# Patient Record
Sex: FEMALE | Race: BLACK OR AFRICAN AMERICAN | HISPANIC OR LATINO | Employment: FULL TIME | URBAN - METROPOLITAN AREA
[De-identification: names, ages, dates, MRNs, and addresses within clinical notes are randomized per-mention and may not be internally consistent; named-entity substitution may affect disease eponyms.]

---

## 2020-09-18 ENCOUNTER — LAB (OUTPATIENT)
Dept: LAB | Facility: HOSPITAL | Age: 25
End: 2020-09-18

## 2020-09-18 ENCOUNTER — OFFICE VISIT (OUTPATIENT)
Dept: INTERNAL MEDICINE | Facility: CLINIC | Age: 25
End: 2020-09-18

## 2020-09-18 VITALS
BODY MASS INDEX: 20.89 KG/M2 | DIASTOLIC BLOOD PRESSURE: 70 MMHG | HEIGHT: 66 IN | TEMPERATURE: 97.1 F | OXYGEN SATURATION: 99 % | WEIGHT: 130 LBS | HEART RATE: 92 BPM | SYSTOLIC BLOOD PRESSURE: 118 MMHG | RESPIRATION RATE: 16 BRPM

## 2020-09-18 DIAGNOSIS — R00.2 PALPITATIONS: ICD-10-CM

## 2020-09-18 DIAGNOSIS — R00.2 PALPITATIONS: Primary | ICD-10-CM

## 2020-09-18 DIAGNOSIS — D50.9 MICROCYTIC ANEMIA: ICD-10-CM

## 2020-09-18 DIAGNOSIS — F41.9 ANXIETY: ICD-10-CM

## 2020-09-18 DIAGNOSIS — Z23 NEED FOR VACCINATION: ICD-10-CM

## 2020-09-18 LAB
DEPRECATED RDW RBC AUTO: 39.1 FL (ref 37–54)
ERYTHROCYTE [DISTWIDTH] IN BLOOD BY AUTOMATED COUNT: 17.1 % (ref 12.3–15.4)
HCT VFR BLD AUTO: 33.6 % (ref 34–46.6)
HGB BLD-MCNC: 10.1 G/DL (ref 12–15.9)
MCH RBC QN AUTO: 19.7 PG (ref 26.6–33)
MCHC RBC AUTO-ENTMCNC: 30.1 G/DL (ref 31.5–35.7)
MCV RBC AUTO: 65.5 FL (ref 79–97)
PLATELET # BLD AUTO: 270 10*3/MM3 (ref 140–450)
PMV BLD AUTO: 11.4 FL (ref 6–12)
RBC # BLD AUTO: 5.13 10*6/MM3 (ref 3.77–5.28)
WBC # BLD AUTO: 6.48 10*3/MM3 (ref 3.4–10.8)

## 2020-09-18 PROCEDURE — 80053 COMPREHEN METABOLIC PANEL: CPT

## 2020-09-18 PROCEDURE — 99204 OFFICE O/P NEW MOD 45 MIN: CPT | Performed by: NURSE PRACTITIONER

## 2020-09-18 PROCEDURE — 84443 ASSAY THYROID STIM HORMONE: CPT

## 2020-09-18 PROCEDURE — 90471 IMMUNIZATION ADMIN: CPT | Performed by: NURSE PRACTITIONER

## 2020-09-18 PROCEDURE — 82728 ASSAY OF FERRITIN: CPT

## 2020-09-18 PROCEDURE — 90686 IIV4 VACC NO PRSV 0.5 ML IM: CPT | Performed by: NURSE PRACTITIONER

## 2020-09-18 PROCEDURE — 84466 ASSAY OF TRANSFERRIN: CPT

## 2020-09-18 PROCEDURE — 36415 COLL VENOUS BLD VENIPUNCTURE: CPT

## 2020-09-18 PROCEDURE — 83540 ASSAY OF IRON: CPT

## 2020-09-18 PROCEDURE — 85027 COMPLETE CBC AUTOMATED: CPT

## 2020-09-18 PROCEDURE — 93000 ELECTROCARDIOGRAM COMPLETE: CPT | Performed by: NURSE PRACTITIONER

## 2020-09-18 RX ORDER — HYDROXYZINE HYDROCHLORIDE 25 MG/1
25 TABLET, FILM COATED ORAL 3 TIMES DAILY PRN
Qty: 30 TABLET | Refills: 0 | Status: SHIPPED | OUTPATIENT
Start: 2020-09-18 | End: 2020-10-21 | Stop reason: SDUPTHER

## 2020-09-18 NOTE — PROGRESS NOTES
Subjective   Maddy Chisholm is a 25 y.o. female here to establish care.  Chief Complaint   Patient presents with   • Establish Care   • Rapid Heart Rate     ongoing-has anxiety       Heart Problem  This is a new problem. The current episode started more than 1 year ago. The problem occurs intermittently. The problem has been waxing and waning. Associated symptoms include fatigue. Pertinent negatives include no abdominal pain, arthralgias, chest pain, chills, coughing, diaphoresis, fever, headaches, joint swelling, myalgias, nausea, neck pain, numbness, rash, sore throat, urinary symptoms, vertigo, visual change, vomiting or weakness. Nothing (feeling anxious) aggravates the symptoms. She has tried nothing for the symptoms.   Anxiety  Presents for initial visit. Onset was more than 5 years ago. The problem has been waxing and waning. Symptoms include excessive worry, muscle tension, palpitations, panic and restlessness. Patient reports no chest pain, confusion, decreased concentration, depressed mood, dizziness, impotence, insomnia, irritability, nausea, nervous/anxious behavior, obsessions, shortness of breath or suicidal ideas. Symptoms occur occasionally. The severity of symptoms is moderate. Nothing (learning to drive) aggravates the symptoms. The quality of sleep is fair.     There are no known risk factors. There is no history of anemia, anxiety/panic attacks, arrhythmia, asthma, bipolar disorder, CAD, CHF, chronic lung disease, depression, fibromyalgia, hyperthyroidism or suicide attempts. Past treatments include nothing.      From [place]- got a prescription for anxiety meds there- she is not taking and does not recall the name of it.     JAIRO 7 score is a 15 in office making it somewhat difficult to take care of things at home.  She has nervousness anxiousness worry, trouble relaxing, and being restless and irritable with fears that something awful may happen.  She does admit that she is currently learning to  drive and this is a source of anxiety for her as well.        The following portions of the patient's history were reviewed and updated as appropriate: allergies, current medications, past family history, past medical history, past social history, past surgical history and problem list.    Review of Systems   Constitutional: Positive for fatigue. Negative for activity change, appetite change, chills, diaphoresis, fever, irritability, unexpected weight gain and unexpected weight loss.   HENT: Negative.  Negative for sore throat.    Eyes: Negative for pain and visual disturbance.   Respiratory: Negative for cough, chest tightness and shortness of breath.    Cardiovascular: Positive for palpitations. Negative for chest pain and leg swelling.   Gastrointestinal: Negative for abdominal distention, abdominal pain, constipation, diarrhea, nausea and vomiting.   Endocrine: Positive for cold intolerance and heat intolerance. Negative for polydipsia, polyphagia and polyuria.   Genitourinary: Negative.  Negative for difficulty urinating and impotence.   Musculoskeletal: Negative.  Negative for arthralgias, joint swelling, myalgias and neck pain.   Skin: Negative for color change, dry skin and rash.   Neurological: Negative for dizziness, vertigo, weakness, light-headedness, numbness, headache and confusion.   Hematological: Does not bruise/bleed easily.   Psychiatric/Behavioral: Positive for stress. Negative for agitation, behavioral problems, decreased concentration, dysphoric mood, hallucinations, sleep disturbance, suicidal ideas, negative for hyperactivity and depressed mood. The patient is not nervous/anxious and does not have insomnia.            No Known Allergies  Past Medical History:   Diagnosis Date   • Anxiety      Past Surgical History:   Procedure Laterality Date   • NO PAST SURGERIES       Family History   Adopted: Yes     Social History     Socioeconomic History   • Marital status: Single     Spouse name: Not  "on file   • Number of children: 0   • Years of education: Not on file   • Highest education level: Not on file   Occupational History   • Occupation: [employer]i   Social Needs   • Financial resource strain: Not hard at all   • Food insecurity     Worry: Never true     Inability: Never true   • Transportation needs     Medical: No     Non-medical: No   Tobacco Use   • Smoking status: Former Smoker     Types: Cigarettes     Start date: 2020     Quit date: 8/15/2020     Years since quittin.1   • Smokeless tobacco: Never Used   • Tobacco comment: smoked for 5 years  (black and milds for 5 years then restarted 2020 and quit 2020   Substance and Sexual Activity   • Alcohol use: Never     Frequency: Never   • Drug use: Never   • Sexual activity: Yes     Birth control/protection: OCP   Social History Narrative    Lives with [facility]     Immunization History   Administered Date(s) Administered   • Flulaval/Fluarix Quad 2020       Current Outpatient Medications:   •  DROSPIRENONE-ETHINYL ESTRADIOL PO, Take 1 tablet by mouth Daily., Disp: , Rfl:   •  hydrOXYzine (ATARAX) 25 MG tablet, Take 1 tablet by mouth 3 (Three) Times a Day As Needed for Anxiety., Disp: 30 tablet, Rfl: 0    Objective   Blood pressure 118/70, pulse 92, temperature 97.1 °F (36.2 °C), resp. rate 16, height 167.9 cm (66.1\"), weight 59 kg (130 lb), last menstrual period 2020, SpO2 99 %, not currently breastfeeding.  Physical Exam  Vitals signs and nursing note reviewed.   Constitutional:       General: She is not in acute distress.     Appearance: Normal appearance. She is well-developed. She is not diaphoretic.   HENT:      Head: Normocephalic and atraumatic.   Eyes:      Conjunctiva/sclera: Conjunctivae normal.      Pupils: Pupils are equal, round, and reactive to light.   Neck:      Musculoskeletal: Normal range of motion and neck supple.      Thyroid: No thyroid mass, thyromegaly or thyroid tenderness.      Vascular: No JVD. "   Cardiovascular:      Rate and Rhythm: Normal rate and regular rhythm.  No extrasystoles are present.     Chest Wall: PMI is not displaced. No thrill.      Pulses:           Radial pulses are 2+ on the right side and 2+ on the left side.        Dorsalis pedis pulses are 2+ on the right side and 2+ on the left side.        Posterior tibial pulses are 2+ on the right side and 2+ on the left side.      Heart sounds: Normal heart sounds. Heart sounds not distant. No murmur.   Pulmonary:      Effort: Pulmonary effort is normal. No tachypnea, bradypnea, accessory muscle usage or respiratory distress.      Breath sounds: Normal breath sounds.   Chest:      Chest wall: No tenderness.   Abdominal:      General: Bowel sounds are normal. There is no distension.      Palpations: Abdomen is soft.      Tenderness: There is no abdominal tenderness.   Musculoskeletal: Normal range of motion.      Right lower leg: No edema.      Left lower leg: No edema.   Lymphadenopathy:      Cervical: No cervical adenopathy.   Skin:     General: Skin is warm and dry.      Coloration: Skin is not pale.      Findings: No erythema.   Neurological:      Mental Status: She is alert and oriented to person, place, and time.   Psychiatric:         Behavior: Behavior normal.         Thought Content: Thought content normal.         Judgment: Judgment normal.           ECG 12 Lead    Date/Time: 9/18/2020 10:10 AM  Performed by: Vero Lopez APRN  Authorized by: Vero Lopez APRN   Comparison: not compared with previous ECG   Previous ECG: no previous ECG available  Rhythm: sinus rhythm  Rate: normal  BPM: 76  Conduction: conduction normal  ST Segments: ST segments normal    Clinical impression: normal ECG  Comments:   QRS 74  QT/QTc 348/378  p-r-t 78  76  54          Assessment/Plan   Diagnoses and all orders for this visit:    1. Palpitations (Primary)  -     ECG 12 Lead  -     CBC (No Diff); Future  -     Comprehensive Metabolic Panel;  Future  -     TSH Rfx On Abnormal To Free T4; Future    2. Anxiety  -     ECG 12 Lead  -     CBC (No Diff); Future  -     Comprehensive Metabolic Panel; Future  -     TSH Rfx On Abnormal To Free T4; Future  -     hydrOXYzine (ATARAX) 25 MG tablet; Take 1 tablet by mouth 3 (Three) Times a Day As Needed for Anxiety.  Dispense: 30 tablet; Refill: 0    3. Microcytic anemia  -     Ferritin; Future  -     Iron Profile; Future    4. Need for vaccination  -     FluLaval Quad >6 Months ([id number])      Labs sent as above- will notify of results and treat accordingly. If patient has not received results within one week, they will notify my office  EKG shows NSR.   Hydroxyzine PRN anxiety, AE's discussed.   Flu vaccine updated.          Return in about 4 weeks (around 10/16/2020) for Recheck, Labs today.  Plan of care discussed with pt. They verbalized understanding and agreement.       * Please note that portions of this note were completed with a voice recognition program. Efforts were made to edit the dictation but occasionally words are erroneously transcribed.

## 2020-09-18 NOTE — PATIENT INSTRUCTIONS

## 2020-09-19 LAB
ALBUMIN SERPL-MCNC: 4.3 G/DL (ref 3.5–5.2)
ALBUMIN/GLOB SERPL: 1.2 G/DL
ALP SERPL-CCNC: 53 U/L (ref 39–117)
ALT SERPL W P-5'-P-CCNC: 8 U/L (ref 1–33)
ANION GAP SERPL CALCULATED.3IONS-SCNC: 10.1 MMOL/L (ref 5–15)
AST SERPL-CCNC: 20 U/L (ref 1–32)
BILIRUB SERPL-MCNC: <0.2 MG/DL (ref 0–1.2)
BUN SERPL-MCNC: 13 MG/DL (ref 6–20)
BUN/CREAT SERPL: 17.3 (ref 7–25)
CALCIUM SPEC-SCNC: 9.8 MG/DL (ref 8.6–10.5)
CHLORIDE SERPL-SCNC: 106 MMOL/L (ref 98–107)
CO2 SERPL-SCNC: 21.9 MMOL/L (ref 22–29)
CREAT SERPL-MCNC: 0.75 MG/DL (ref 0.57–1)
FERRITIN SERPL-MCNC: 4.84 NG/ML (ref 13–150)
GFR SERPL CREATININE-BSD FRML MDRD: 114 ML/MIN/1.73
GLOBULIN UR ELPH-MCNC: 3.6 GM/DL
GLUCOSE SERPL-MCNC: 95 MG/DL (ref 65–99)
IRON 24H UR-MRATE: 16 MCG/DL (ref 37–145)
IRON SATN MFR SERPL: 2 % (ref 20–50)
POTASSIUM SERPL-SCNC: 4.6 MMOL/L (ref 3.5–5.2)
PROT SERPL-MCNC: 7.9 G/DL (ref 6–8.5)
SODIUM SERPL-SCNC: 138 MMOL/L (ref 136–145)
TIBC SERPL-MCNC: 703 MCG/DL (ref 298–536)
TRANSFERRIN SERPL-MCNC: 472 MG/DL (ref 200–360)
TSH SERPL DL<=0.05 MIU/L-ACNC: 2.66 UIU/ML (ref 0.27–4.2)

## 2020-09-22 ENCOUNTER — TELEPHONE (OUTPATIENT)
Dept: INTERNAL MEDICINE | Facility: CLINIC | Age: 25
End: 2020-09-22

## 2020-09-22 DIAGNOSIS — D50.8 IRON DEFICIENCY ANEMIA SECONDARY TO INADEQUATE DIETARY IRON INTAKE: Primary | ICD-10-CM

## 2020-09-22 RX ORDER — DOXYCYCLINE HYCLATE 50 MG/1
324 CAPSULE, GELATIN COATED ORAL
Qty: 90 TABLET | Refills: 2 | Status: SHIPPED | OUTPATIENT
Start: 2020-09-22 | End: 2020-12-28 | Stop reason: SDUPTHER

## 2020-09-22 NOTE — TELEPHONE ENCOUNTER
THE PATIENTS FATHER IS CALLING TO REQUEST A PRESCRIPTION  FOR IRON FOR THE PATIENT. HE STATES THEY GOT HER TEST RESULTS BACK AND HE IRON WAS LOW. HE WOULD LIKE TO KNOW IF SHE SHOULD START ON SOME TIME OF PRESCRIPTION BEFORE HER NEXT FOLLOW UP APPOINTMENT IN October. PATIENT WOULD PREFER A GENERIC PRESCRIPTION IF POSSIBLE.    PATIENT CALLBACK [phone]   PREFERRED PHARMACY [address] [phone] [facility] [phone]   [phone]

## 2020-10-19 ENCOUNTER — OFFICE VISIT (OUTPATIENT)
Dept: INTERNAL MEDICINE | Facility: CLINIC | Age: 25
End: 2020-10-19

## 2020-10-19 ENCOUNTER — LAB (OUTPATIENT)
Dept: LAB | Facility: HOSPITAL | Age: 25
End: 2020-10-19

## 2020-10-19 VITALS
WEIGHT: 137 LBS | OXYGEN SATURATION: 99 % | HEART RATE: 90 BPM | HEIGHT: 66 IN | RESPIRATION RATE: 16 BRPM | TEMPERATURE: 97.7 F | BODY MASS INDEX: 22.02 KG/M2 | DIASTOLIC BLOOD PRESSURE: 68 MMHG | SYSTOLIC BLOOD PRESSURE: 108 MMHG

## 2020-10-19 DIAGNOSIS — D50.8 IRON DEFICIENCY ANEMIA SECONDARY TO INADEQUATE DIETARY IRON INTAKE: Primary | ICD-10-CM

## 2020-10-19 DIAGNOSIS — F41.9 ANXIETY: ICD-10-CM

## 2020-10-19 DIAGNOSIS — D50.8 IRON DEFICIENCY ANEMIA SECONDARY TO INADEQUATE DIETARY IRON INTAKE: ICD-10-CM

## 2020-10-19 DIAGNOSIS — R00.2 PALPITATIONS: ICD-10-CM

## 2020-10-19 DIAGNOSIS — Z30.41 ENCOUNTER FOR SURVEILLANCE OF CONTRACEPTIVE PILLS: ICD-10-CM

## 2020-10-19 LAB
DEPRECATED RDW RBC AUTO: 52.8 FL (ref 37–54)
ERYTHROCYTE [DISTWIDTH] IN BLOOD BY AUTOMATED COUNT: 23.1 % (ref 12.3–15.4)
HCT VFR BLD AUTO: 37.3 % (ref 34–46.6)
HGB BLD-MCNC: 11.8 G/DL (ref 12–15.9)
MCH RBC QN AUTO: 22 PG (ref 26.6–33)
MCHC RBC AUTO-ENTMCNC: 31.6 G/DL (ref 31.5–35.7)
MCV RBC AUTO: 69.6 FL (ref 79–97)
PLATELET # BLD AUTO: 233 10*3/MM3 (ref 140–450)
PMV BLD AUTO: 10.8 FL (ref 6–12)
RBC # BLD AUTO: 5.36 10*6/MM3 (ref 3.77–5.28)
WBC # BLD AUTO: 4.68 10*3/MM3 (ref 3.4–10.8)

## 2020-10-19 PROCEDURE — 99214 OFFICE O/P EST MOD 30 MIN: CPT | Performed by: NURSE PRACTITIONER

## 2020-10-19 PROCEDURE — 85027 COMPLETE CBC AUTOMATED: CPT

## 2020-10-19 RX ORDER — DROSPIRENONE AND ETHINYL ESTRADIOL 0.03MG-3MG
1 KIT ORAL DAILY
Qty: 28 TABLET | Refills: 3 | Status: SHIPPED | OUTPATIENT
Start: 2020-10-19 | End: 2021-02-19 | Stop reason: SDUPTHER

## 2020-10-19 NOTE — PROGRESS NOTES
Subjective   Maddy Chisholm is a 25 y.o. female.     Chief Complaint   Patient presents with   • Anxiety     follow up on anxiety and palpitaions   • Palpitations   • Contraception     refill birth control       History of Present Illness     Maddy is a 25-year-old female who is here for follow-up on anxiety, palpitations, iron deficiency anemia, and would like a refill on her contraception.  At her last visit we discussed some ongoing anxiety as a potential cause for the palpitations.  EKG at her last office visit showed a normal sinus rhythm.  She was started on hydroxyzine as needed for anxiety. She has used about 15 pills and feels like this is helping with the anxiety but she also feels as though the anemia was the primary cause for her palpitations.  Her labs also indicated that she had a iron deficiency anemia.  Her H&H were 10.2 and 33.6 while her ferritin was 4.84. she denies any blood loss.  She was not taking any iron rich foods prior to her last visit.  She has since started increasing iron in her diet as well as iron supplement TID with vit C.       Contraception -she is currently on drospirenone-ethinyl estradiol and feels as though this works well for her.  Menses are regular with no spotting.  She has no history of migraine with aura, breast cancer, thromboembolic disease, cardiac or cerebrovascular disease, or uncontrolled hypertension.      The following portions of the patient's history were reviewed and updated as appropriate: allergies, current medications, past family history, past medical history, past social history, past surgical history and problem list.        Review of Systems   Constitutional: Negative for fatigue, fever and unexpected weight loss.   Eyes: Negative for blurred vision, double vision, pain and visual disturbance.   Respiratory: Positive for chest tightness ( occasional, improving) and shortness of breath (occasional, improving). Negative for cough and wheezing.     Cardiovascular: Positive for palpitations (better). Negative for chest pain and leg swelling.   Gastrointestinal: Negative for abdominal pain, constipation, diarrhea, nausea and vomiting.   Genitourinary: Negative for breast lump, difficulty urinating, frequency, menstrual problem, urgency and vaginal discharge.   Musculoskeletal: Negative for arthralgias and myalgias.   Skin: Negative for color change and rash.   Neurological: Negative for dizziness, weakness, light-headedness, headache and confusion.   Hematological: Negative for adenopathy. Does not bruise/bleed easily.   Psychiatric/Behavioral: The patient is nervous/anxious.            Outpatient Medications Marked as Taking for the 10/19/20 encounter (Office Visit) with Vero Lopez APRN   Medication Sig Dispense Refill   • ferrous gluconate (FERGON) 324 MG tablet Take 1 tablet by mouth 3 (Three) Times a Day With Meals. 90 tablet 2   • hydrOXYzine (ATARAX) 25 MG tablet Take 1 tablet by mouth 3 (Three) Times a Day As Needed for Anxiety. 30 tablet 0   • [DISCONTINUED] DROSPIRENONE-ETHINYL ESTRADIOL PO Take 1 tablet by mouth Daily.       No Known Allergies        Objective   Physical Exam  Vitals signs and nursing note reviewed.   Constitutional:       General: She is not in acute distress.     Appearance: Normal appearance. She is well-developed. She is not diaphoretic.   HENT:      Head: Normocephalic and atraumatic.   Eyes:      Conjunctiva/sclera: Conjunctivae normal.      Pupils: Pupils are equal, round, and reactive to light.   Neck:      Musculoskeletal: Normal range of motion.      Thyroid: No thyroid mass or thyromegaly.      Vascular: No JVD.   Cardiovascular:      Rate and Rhythm: Normal rate and regular rhythm.      Heart sounds: Normal heart sounds. No murmur.   Pulmonary:      Effort: Pulmonary effort is normal. No respiratory distress.      Breath sounds: Normal breath sounds.   Chest:      Chest wall: No tenderness.   Abdominal:       "General: Bowel sounds are normal. There is no distension.      Palpations: Abdomen is soft.      Tenderness: There is no abdominal tenderness.   Musculoskeletal: Normal range of motion.   Skin:     General: Skin is warm and dry.      Coloration: Skin is not pale.      Findings: No erythema.   Neurological:      Mental Status: She is alert and oriented to person, place, and time.   Psychiatric:         Attention and Perception: She is attentive.         Mood and Affect: Mood is anxious (mild). Mood is not depressed. Affect is not angry or inappropriate.         Speech: Speech normal.         Behavior: Behavior normal.         Thought Content: Thought content normal.         Judgment: Judgment normal.         Vitals:    10/19/20 1110   BP: 108/68   Pulse: 90   Resp: 16   Temp: 97.7 °F (36.5 °C)   SpO2: 99%   Weight: 62.1 kg (137 lb)   Height: 167.9 cm (66.1\")     Body mass index is 22.05 kg/m².  Wt Readings from Last 3 Encounters:   10/19/20 62.1 kg (137 lb)   09/18/20 59 kg (130 lb)           Assessment/Plan       Diagnoses and all orders for this visit:    1. Iron deficiency anemia secondary to inadequate dietary iron intake (Primary)  -     CBC (No Diff); Future  We will recheck her CBC to make sure her H&H are improving.  Otherwise plan to continue her iron supplement for 3 months then recheck ferritin  Continue increased iron intake and foods.  2. Anxiety  Ongoing but improving.  She has used some of the as needed hydroxyzine with good relief.  Encouraged her to continue to use the hydroxyzine if needed.  3. Palpitations  Palpitations are improving but have occurred 2 or 3 times in the last week.  She feels as though these gotten significantly better with treating her iron deficiency anemia and would like to give it a little bit longer to see if gets even better before proceeding with anything further with a Holter monitor or beta-blocker.    4. Encounter for surveillance of contraceptive pills  -     " drospirenone-ethinyl estradiol (TICO,OCELLA) 3-0.03 MG per tablet; Take 1 tablet by mouth Daily.  Dispense: 28 tablet; Refill: 3  Contraception refilled for now.  She is due for annual exam with Pap smear and will get that done sometime within the next several months.      Return in about 4 weeks (around 11/16/2020) for Recheck, Labs today.    I discussed my findings,recommendations, and plan of care was with the patient. They verbalized understanding and agreement.  Patient was encouraged to keep me informed of any acute changes, lack of improvement, or any new concerning symptoms.       * Please note that portions of this note were completed with a voice recognition program. Efforts were made to edit the dictation but occasionally words are erroneously transcribed.

## 2020-10-21 DIAGNOSIS — F41.9 ANXIETY: ICD-10-CM

## 2020-10-21 DIAGNOSIS — D50.8 IRON DEFICIENCY ANEMIA SECONDARY TO INADEQUATE DIETARY IRON INTAKE: ICD-10-CM

## 2020-10-21 RX ORDER — DOXYCYCLINE HYCLATE 50 MG/1
324 CAPSULE, GELATIN COATED ORAL
Qty: 90 TABLET | Refills: 2 | OUTPATIENT
Start: 2020-10-21

## 2020-10-21 RX ORDER — HYDROXYZINE HYDROCHLORIDE 25 MG/1
25 TABLET, FILM COATED ORAL 3 TIMES DAILY PRN
Qty: 30 TABLET | Refills: 1 | Status: SHIPPED | OUTPATIENT
Start: 2020-10-21 | End: 2021-01-18 | Stop reason: SDUPTHER

## 2020-10-21 NOTE — TELEPHONE ENCOUNTER
Caller: BOWEN BECKER    Relationship: Father    Best call back number:[phone]    Medication needed:   Requested Prescriptions     Pending Prescriptions Disp Refills   • ferrous gluconate (FERGON) 324 MG tablet 90 tablet 2     Sig: Take 1 tablet by mouth 3 (Three) Times a Day With Meals.   • hydrOXYzine (ATARAX) 25 MG tablet 30 tablet 0     Sig: Take 1 tablet by mouth 3 (Three) Times a Day As Needed for Anxiety.       When do you need the refill by: 10/23/20    What details did the patient provide when requesting the medication:     Does the patient have less than a 3 day supply:  [x] Yes  [] No    What is the patient's preferred pharmacy:  [facility] [address] [place]

## 2020-10-21 NOTE — TELEPHONE ENCOUNTER
Last seen 10/109.  Next appointment 11/23.  Should have refill on ferrous gluconate.  Needs hydroxyzine

## 2020-11-20 ENCOUNTER — LAB (OUTPATIENT)
Dept: LAB | Facility: HOSPITAL | Age: 25
End: 2020-11-20

## 2020-11-20 ENCOUNTER — OFFICE VISIT (OUTPATIENT)
Dept: INTERNAL MEDICINE | Facility: CLINIC | Age: 25
End: 2020-11-20

## 2020-11-20 VITALS
BODY MASS INDEX: 22.02 KG/M2 | DIASTOLIC BLOOD PRESSURE: 68 MMHG | WEIGHT: 137 LBS | OXYGEN SATURATION: 99 % | TEMPERATURE: 97.8 F | HEART RATE: 87 BPM | SYSTOLIC BLOOD PRESSURE: 110 MMHG | HEIGHT: 66 IN | RESPIRATION RATE: 16 BRPM

## 2020-11-20 DIAGNOSIS — F41.9 ANXIETY: Primary | ICD-10-CM

## 2020-11-20 DIAGNOSIS — R20.2 TINGLING OF BOTH FEET: ICD-10-CM

## 2020-11-20 DIAGNOSIS — R53.83 FATIGUE, UNSPECIFIED TYPE: ICD-10-CM

## 2020-11-20 DIAGNOSIS — D50.8 IRON DEFICIENCY ANEMIA SECONDARY TO INADEQUATE DIETARY IRON INTAKE: ICD-10-CM

## 2020-11-20 PROCEDURE — 83921 ORGANIC ACID SINGLE QUANT: CPT | Performed by: NURSE PRACTITIONER

## 2020-11-20 PROCEDURE — 99213 OFFICE O/P EST LOW 20 MIN: CPT | Performed by: NURSE PRACTITIONER

## 2020-11-20 PROCEDURE — 82746 ASSAY OF FOLIC ACID SERUM: CPT | Performed by: NURSE PRACTITIONER

## 2020-11-20 PROCEDURE — 82607 VITAMIN B-12: CPT | Performed by: NURSE PRACTITIONER

## 2020-11-20 PROCEDURE — 83090 ASSAY OF HOMOCYSTEINE: CPT | Performed by: NURSE PRACTITIONER

## 2020-11-20 NOTE — PROGRESS NOTES
Subjective   Maddy Chisholm is a 25 y.o. female.     Chief Complaint   Patient presents with   • Anxiety     getting better   • iron defiency       History of Present Illness      Maddy is a 25-year-old female who is here to follow-up on anxiety, palpitations, iron deficiency anemia.  She reports that she is doing very well since her last visit.  At her last visit we refilled her contraception she is doing well with this.    Anxiety-chronic.  Has as needed hydroxyzine.  Reports anxiety is getting better and she is not having to use this.    Iron deficiency-she is currently on iron supplementation ferrous gluconate 3 times a day.  She is noticing some ongoing fatigue and notes that her feet have been intermittently becoming numb.  She usually attributes this to a sitting position.  Numbness did not last very long she would like to have her B12 levels checked because she is convinced she has pernicious anemia  Her last labs showed a stable H&H of 11.8 and 37.3.  She has not had any macrocytic anemia but rather did have microcytic hypochromic anemia at diagnosis in 9/2020     Lab Results   Component Value Date    WBC 4.68 10/19/2020    HGB 11.8 (L) 10/19/2020    HCT 37.3 10/19/2020    MCV 69.6 (L) 10/19/2020     10/19/2020           The following portions of the patient's history were reviewed and updated as appropriate: allergies, current medications, past family history, past medical history, past social history, past surgical history and problem list.        Review of Systems   Constitutional: Positive for fatigue. Negative for fever and unexpected weight loss.   Eyes: Negative for blurred vision, double vision and visual disturbance.   Respiratory: Negative for cough, shortness of breath and wheezing.    Cardiovascular: Positive for palpitations ( resolved). Negative for chest pain and leg swelling.   Gastrointestinal: Negative for abdominal pain, constipation, diarrhea, nausea and vomiting.   Genitourinary:  Negative for difficulty urinating, frequency and urgency.   Musculoskeletal: Negative for arthralgias and myalgias.   Skin: Negative for color change and rash.   Neurological: Positive for numbness. Negative for dizziness, weakness and headache.   Hematological: Negative for adenopathy. Does not bruise/bleed easily.   Psychiatric/Behavioral: The patient is nervous/anxious ( improved).            Outpatient Medications Marked as Taking for the 11/20/20 encounter (Office Visit) with Vero Lopez APRN   Medication Sig Dispense Refill   • drospirenone-ethinyl estradiol (TICO,OCELLA) 3-0.03 MG per tablet Take 1 tablet by mouth Daily. 28 tablet 3   • ferrous gluconate (FERGON) 324 MG tablet Take 1 tablet by mouth 3 (Three) Times a Day With Meals. 90 tablet 2   • hydrOXYzine (ATARAX) 25 MG tablet Take 1 tablet by mouth 3 (Three) Times a Day As Needed for Anxiety. 30 tablet 1     No Known Allergies        Objective   Physical Exam  Vitals signs and nursing note reviewed.   Constitutional:       General: She is not in acute distress.     Appearance: Normal appearance. She is well-developed. She is not diaphoretic.   HENT:      Head: Normocephalic and atraumatic.   Eyes:      Conjunctiva/sclera: Conjunctivae normal.      Pupils: Pupils are equal, round, and reactive to light.   Neck:      Musculoskeletal: Normal range of motion.      Vascular: No JVD.   Cardiovascular:      Rate and Rhythm: Normal rate and regular rhythm.      Heart sounds: Normal heart sounds. No murmur.   Pulmonary:      Effort: Pulmonary effort is normal. No respiratory distress.      Breath sounds: Normal breath sounds.   Chest:      Chest wall: No tenderness.   Abdominal:      General: Bowel sounds are normal. There is no distension.      Palpations: Abdomen is soft.      Tenderness: There is no abdominal tenderness.   Musculoskeletal: Normal range of motion.   Skin:     General: Skin is warm and dry.      Coloration: Skin is not pale.       "Findings: No erythema.   Neurological:      Mental Status: She is alert and oriented to person, place, and time.   Psychiatric:         Behavior: Behavior normal.         Thought Content: Thought content normal.         Judgment: Judgment normal.         Vitals:    11/20/20 1348   BP: 110/68   Pulse: 87   Resp: 16   Temp: 97.8 °F (36.6 °C)   SpO2: 99%   Weight: 62.1 kg (137 lb)   Height: 167.9 cm (66.1\")     Body mass index is 22.05 kg/m².  Wt Readings from Last 3 Encounters:   11/20/20 62.1 kg (137 lb)   10/19/20 62.1 kg (137 lb)   09/18/20 59 kg (130 lb)             Assessment/Plan       Diagnoses and all orders for this visit:    1. Anxiety (Primary)  Well-controlled.  Continue as needed hydroxyzine if needed  2. Fatigue, unspecified type  -     Vitamin B12; Future  -     Folate; Future  -     Homocysteine; Future  -     Methylmalonic Acid, Serum; Future  We will check additional labs.  Unlikely that B12 is culprit due to anemia being more of a microcytic but can check for safe measure and reassurance for Maddy.  3. Iron deficiency anemia secondary to inadequate dietary iron intake  Continue iron supplement.  Can recheck in a couple of months and see if we need to continue beyond that.  4. Tingling of both feet  -     Vitamin B12; Future  -     Folate; Future  -     Homocysteine; Future  -     Methylmalonic Acid, Serum; Future        Return in about 6 weeks (around 1/1/2021) for Annual, Labs today.    I discussed my findings,recommendations, and plan of care was with the patient. They verbalized understanding and agreement.  Patient was encouraged to keep me informed of any acute changes, lack of improvement, or any new concerning symptoms.       * Please note that portions of this note were completed with a voice recognition program. Efforts were made to edit the dictation but occasionally words are erroneously transcribed.   "

## 2020-11-21 LAB
FOLATE SERPL-MCNC: 14.8 NG/ML (ref 4.78–24.2)
HCYS SERPL-MCNC: 12.3 UMOL/L (ref 0–15)
VIT B12 BLD-MCNC: 228 PG/ML (ref 211–946)

## 2020-11-28 LAB
Lab: ABNORMAL
METHYLMALONATE SERPL-SCNC: 585 NMOL/L (ref 0–378)

## 2020-12-11 ENCOUNTER — CLINICAL SUPPORT (OUTPATIENT)
Dept: INTERNAL MEDICINE | Facility: CLINIC | Age: 25
End: 2020-12-11

## 2020-12-11 ENCOUNTER — OFFICE VISIT (OUTPATIENT)
Dept: INTERNAL MEDICINE | Facility: CLINIC | Age: 25
End: 2020-12-11

## 2020-12-11 VITALS
DIASTOLIC BLOOD PRESSURE: 66 MMHG | TEMPERATURE: 97.5 F | BODY MASS INDEX: 22.02 KG/M2 | RESPIRATION RATE: 16 BRPM | WEIGHT: 137 LBS | HEIGHT: 66 IN | SYSTOLIC BLOOD PRESSURE: 110 MMHG | HEART RATE: 93 BPM | OXYGEN SATURATION: 98 %

## 2020-12-11 DIAGNOSIS — D50.8 IRON DEFICIENCY ANEMIA SECONDARY TO INADEQUATE DIETARY IRON INTAKE: ICD-10-CM

## 2020-12-11 DIAGNOSIS — H65.93 FLUID LEVEL BEHIND TYMPANIC MEMBRANE OF BOTH EARS: ICD-10-CM

## 2020-12-11 DIAGNOSIS — E53.8 B12 DEFICIENCY: Primary | ICD-10-CM

## 2020-12-11 DIAGNOSIS — F41.9 ANXIETY: ICD-10-CM

## 2020-12-11 DIAGNOSIS — E53.8 B12 DEFICIENCY: ICD-10-CM

## 2020-12-11 PROCEDURE — 96372 THER/PROPH/DIAG INJ SC/IM: CPT | Performed by: NURSE PRACTITIONER

## 2020-12-11 PROCEDURE — 99214 OFFICE O/P EST MOD 30 MIN: CPT | Performed by: NURSE PRACTITIONER

## 2020-12-11 RX ORDER — LANOLIN ALCOHOL/MO/W.PET/CERES
1000 CREAM (GRAM) TOPICAL DAILY
COMMUNITY
End: 2020-12-11

## 2020-12-11 RX ORDER — CYANOCOBALAMIN 1000 UG/ML
1000 INJECTION, SOLUTION INTRAMUSCULAR; SUBCUTANEOUS DAILY
Status: SHIPPED | OUTPATIENT
Start: 2020-12-11 | End: 2020-12-16

## 2020-12-11 RX ORDER — FLUTICASONE PROPIONATE 50 MCG
2 SPRAY, SUSPENSION (ML) NASAL DAILY
Qty: 1 BOTTLE | Refills: 3 | Status: SHIPPED | OUTPATIENT
Start: 2020-12-11 | End: 2021-08-03

## 2020-12-11 RX ADMIN — CYANOCOBALAMIN 1000 MCG: 1000 INJECTION, SOLUTION INTRAMUSCULAR; SUBCUTANEOUS at 13:24

## 2020-12-11 NOTE — PATIENT INSTRUCTIONS
Vitamin B12 Deficiency  Vitamin B12 deficiency means that your body does not have enough vitamin B12. The body needs this vitamin:  · To make red blood cells.  · To make genes (DNA).  · To help the nerves work.  If you do not have enough vitamin B12 in your body, you can have health problems.  What are the causes?  · Not eating enough foods that contain vitamin B12.  · Not being able to absorb vitamin B12 from the food that you eat.  · Certain digestive system diseases.  · A condition in which the body does not make enough of a certain protein, which results in too few red blood cells (pernicious anemia).  · Having a surgery in which part of the stomach or small intestine is removed.  · Taking medicines that make it hard for the body to absorb vitamin B12. These medicines include:  ? Heartburn medicines.  ? Some antibiotic medicines.  ? Other medicines that are used to treat certain conditions.  What increases the risk?  · Being older than age 50.  · Eating a vegetarian or vegan diet, especially while you are pregnant.  · Eating a poor diet while you are pregnant.  · Taking certain medicines.  · Having alcoholism.  What are the signs or symptoms?  In some cases, there are no symptoms. If the condition leads to too few blood cells or nerve damage, symptoms can occur, such as:  · Feeling weak.  · Feeling tired (fatigued).  · Not being hungry.  · Weight loss.  · A loss of feeling (numbness) or tingling in your hands and feet.  · Redness and burning of the tongue.  · Being mixed up (confused) or having memory problems.  · Sadness (depression).  · Problems with your senses. This can include color blindness, ringing in the ears, or loss of taste.  · Watery poop (diarrhea) or trouble pooping (constipation).  · Trouble walking.  If anemia is very bad, symptoms can include:  · Being short of breath.  · Being dizzy.  · Having a very fast heartbeat.  How is this treated?  · Changing the way you eat and drink, such  as:  ? Eating more foods that contain vitamin B12.  ? Drinking little or no alcohol.  · Getting vitamin B12 shots.  · Taking vitamin B12 supplements. Your doctor will tell you the dose that is best for you.  Follow these instructions at home:  Eating and drinking    · Eat lots of healthy foods that contain vitamin B12. These include:  ? Meats and poultry, such as beef, pork, chicken, turkey, and organ meats, such as liver.  ? Seafood, such as clams, rainbow trout, salmon, tuna, and juany.  ? Eggs.  ? Cereal and dairy products that have vitamin B12 added to them. Check the label.  The items listed above may not be a complete list of what you can eat and drink. Contact a dietitian for more options.  General instructions  · Get any shots as told by your doctor.  · Take supplements only as told by your doctor.  · Do not drink alcohol if your doctor tells you not to. In some cases, you may only be asked to limit alcohol use.  · Keep all follow-up visits as told by your doctor. This is important.  Contact a doctor if:  · Your symptoms come back.  Get help right away if:  · You have trouble breathing.  · You have a very fast heartbeat.  · You have chest pain.  · You get dizzy.  · You pass out.  Summary  · Vitamin B12 deficiency means that your body is not getting enough vitamin B12.  · In some cases, there are no symptoms of this condition.  · Treatment may include making a change in the way you eat and drink, getting vitamin B12 shots, or taking supplements.  · Eat lots of healthy foods that contain vitamin B12.  This information is not intended to replace advice given to you by your health care provider. Make sure you discuss any questions you have with your health care provider.  Document Revised: 08/27/2019 Document Reviewed: 08/27/2019  [employer] Patient Education © 2020 [employer]    Iron Deficiency Anemia, Adult  Iron-deficiency anemia is when you have a low amount of red blood cells or hemoglobin. This happens  because you have too little iron in your body. Hemoglobin carries oxygen to parts of the body. Anemia can cause your body to not get enough oxygen. It may or may not cause symptoms.  Follow these instructions at home:  Medicines  · Take over-the-counter and prescription medicines only as told by your doctor. This includes iron pills (supplements) and vitamins.  · If you cannot handle taking iron pills by mouth, ask your doctor about getting iron through:  ? A vein (intravenously).  ? A shot (injection) into a muscle.  · Take iron pills when your stomach is empty. If you cannot handle this, take them with food.  · Do not drink milk or take antacids at the same time as your iron pills.  · To prevent trouble pooping (constipation), eat fiber or take medicine (stool softener) as told by your doctor.  Eating and drinking    · Talk with your doctor before changing the foods you eat. He or she may tell you to eat foods that have a lot of iron, such as:  ? Liver.  ? Lowfat (lean) beef.  ? Breads and cereals that have iron added to them (fortified breads and cereals).  ? Eggs.  ? Dried fruit.  ? Dark green, leafy vegetables.  · Drink enough fluid to keep your pee (urine) clear or pale yellow.  · Eat fresh fruits and vegetables that are high in vitamin C. They help your body to use iron. Foods with a lot of vitamin C include:  ? Oranges.  ? Peppers.  ? Tomatoes.  ? Mangoes.  General instructions  · Return to your normal activities as told by your doctor. Ask your doctor what activities are safe for you.  · Keep yourself clean, and keep things clean around you (your surroundings). Anemia can make you get sick more easily.  · Keep all follow-up visits as told by your doctor. This is important.  Contact a doctor if:  · You feel sick to your stomach (nauseous).  · You throw up (vomit).  · You feel weak.  · You are sweating for no clear reason.  · You have trouble pooping, such as:  ? Pooping (having a bowel movement) less than 3  times a week.  ? Straining to poop.  ? Having poop that is hard, dry, or larger than normal.  ? Feeling full or bloated.  ? Pain in the lower belly.  ? Not feeling better after pooping.  Get help right away if:  · You pass out (faint). If this happens, do not drive yourself to the hospital. Call your local emergency services (911 in the [place]  · You have chest pain.  · You have shortness of breath that:  ? Is very bad.  ? Gets worse with physical activity.  · You have a fast heartbeat.  · You get light-headed when getting up from sitting or lying down.  This information is not intended to replace advice given to you by your health care provider. Make sure you discuss any questions you have with your health care provider.  Document Revised: 11/30/2018 Document Reviewed: 09/06/2017  [employer] Patient Education © 2020 [employer]    Managing Anxiety, Adult  After being diagnosed with an anxiety disorder, you may be relieved to know why you have felt or behaved a certain way. You may also feel overwhelmed about the treatment ahead and what it will mean for your life. With care and support, you can manage this condition and recover from it.  How to manage lifestyle changes  Managing stress and anxiety    Stress is your body's reaction to life changes and events, both good and bad. Most stress will last just a few hours, but stress can be ongoing and can lead to more than just stress. Although stress can play a major role in anxiety, it is not the same as anxiety. Stress is usually caused by something external, such as a deadline, test, or competition. Stress normally passes after the triggering event has ended.   Anxiety is caused by something internal, such as imagining a terrible outcome or worrying that something will go wrong that will devastate you. Anxiety often does not go away even after the triggering event is over, and it can become long-term (chronic) worry. It is important to understand the differences  between stress and anxiety and to manage your stress effectively so that it does not lead to an anxious response.  Talk with your health care provider or a counselor to learn more about reducing anxiety and stress. He or she may suggest tension reduction techniques, such as:  · Music therapy. This can include creating or listening to music that you enjoy and that inspires you.  · Mindfulness-based meditation. This involves being aware of your normal breaths while not trying to control your breathing. It can be done while sitting or walking.  · Centering prayer. This involves focusing on a word, phrase, or sacred image that means something to you and brings you peace.  · Deep breathing. To do this, expand your stomach and inhale slowly through your nose. Hold your breath for 3-5 seconds. Then exhale slowly, letting your stomach muscles relax.  · Self-talk. This involves identifying thought patterns that lead to anxiety reactions and changing those patterns.  · Muscle relaxation. This involves tensing muscles and then relaxing them.  Choose a tension reduction technique that suits your lifestyle and personality. These techniques take time and practice. Set aside 5-15 minutes a day to do them. Therapists can offer counseling and training in these techniques. The training to help with anxiety may be covered by some insurance plans. Other things you can do to manage stress and anxiety include:  · Keeping a stress/anxiety diary. This can help you learn what triggers your reaction and then learn ways to manage your response.  · Thinking about how you react to certain situations. You may not be able to control everything, but you can control your response.  · Making time for activities that help you relax and not feeling guilty about spending your time in this way.  · Visual imagery and yoga can help you stay calm and relax.    Medicines  Medicines can help ease symptoms. Medicines for anxiety include:  · Anti-anxiety  drugs.  · Antidepressants.  Medicines are often used as a primary treatment for anxiety disorder. Medicines will be prescribed by a health care provider. When used together, medicines, psychotherapy, and tension reduction techniques may be the most effective treatment.  Relationships  Relationships can play a big part in helping you recover. Try to spend more time connecting with trusted friends and family members. Consider going to couples counseling, taking family education classes, or going to family therapy. Therapy can help you and others better understand your condition.  How to recognize changes in your anxiety  Everyone responds differently to treatment for anxiety. Recovery from anxiety happens when symptoms decrease and stop interfering with your daily activities at home or work. This may mean that you will start to:  · Have better concentration and focus. Worry will interfere less in your daily thinking.  · Sleep better.  · Be less irritable.  · Have more energy.  · Have improved memory.  It is important to recognize when your condition is getting worse. Contact your health care provider if your symptoms interfere with home or work and you feel like your condition is not improving.  Follow these instructions at home:  Activity  · Exercise. Most adults should do the following:  ? Exercise for at least 150 minutes each week. The exercise should increase your heart rate and make you sweat (moderate-intensity exercise).  ? Strengthening exercises at least twice a week.  · Get the right amount and quality of sleep. Most adults need 7-9 hours of sleep each night.  Lifestyle    · Eat a healthy diet that includes plenty of vegetables, fruits, whole grains, low-fat dairy products, and lean protein. Do not eat a lot of foods that are high in solid fats, added sugars, or salt.  · Make choices that simplify your life.  · Do not use any products that contain nicotine or tobacco, such as cigarettes, e-cigarettes, and  chewing tobacco. If you need help quitting, ask your health care provider.  · Avoid caffeine, alcohol, and certain over-the-counter cold medicines. These may make you feel worse. Ask your pharmacist which medicines to avoid.  General instructions  · Take over-the-counter and prescription medicines only as told by your health care provider.  · Keep all follow-up visits as told by your health care provider. This is important.  Where to find support  You can get help and support from these sources:  · Self-help groups.  · Online and community organizations.  · A trusted spiritual leader.  · Couples counseling.  · Family education classes.  · Family therapy.  Where to find more information  You may find that joining a support group helps you deal with your anxiety. The following sources can help you locate counselors or support groups near you:  · Mental Health [place]: www.mentalhealth[place].net  · Anxiety and Depression Association of [place] (ADAA): www.adaa.org  · National [place] [employer] [place] [employer]ness (ANDREW): www.andrew.org  Contact a health care provider if you:  · Have a hard time staying focused or finishing daily tasks.  · Spend many hours a day feeling worried about everyday life.  · Become exhausted by worry.  · Start to have headaches, feel tense, or have nausea.  · Urinate more than normal.  · Have diarrhea.  Get help right away if you have:  · A racing heart and shortness of breath.  · Thoughts of hurting yourself or others.  If you ever feel like you may hurt yourself or others, or have thoughts about taking your own life, get help right away. You can go to your nearest emergency department or call:  · Your local emergency services (911 in the [place]  · A suicide crisis helpline, such as the National Suicide Prevention Lifeline at 1-986.958.6387. This is open 24 hours a day.  Summary  · Taking steps to learn and use tension reduction techniques can help calm you and help prevent triggering an anxiety  reaction.  · When used together, medicines, psychotherapy, and tension reduction techniques may be the most effective treatment.  · Family, friends, and partners can play a big part in helping you recover from an anxiety disorder.  This information is not intended to replace advice given to you by your health care provider. Make sure you discuss any questions you have with your health care provider.  Document Revised: 05/19/2020 Document Reviewed: 05/19/2020  [employer] Patient Education © 2020 [employer]

## 2020-12-11 NOTE — PROGRESS NOTES
Maddy back in office at this time to receive her B12 injection (this was administered at this time into her right deltoid by medical assistant).    I evaluated her left deltoid area where she received the other injection earlier today.  Area without any swelling, warmth, or exudate.  She denies any difficulties or acute complaints.  She did go ahead and take one of her hydroxyzine so that she would not worry too much about receiving the dexamethasone earlier today.  She reports she has no additional questions or concerns and she will return to office on Monday for her next B12 injection.

## 2020-12-11 NOTE — PROGRESS NOTES
Subjective   Maddy Chisholm is a 25 y.o. female.     Chief Complaint   Patient presents with   • B12 defiency       History of Present Illness   Answers for HPI/ROS submitted by the patient on 12/10/2020   What is the primary reason for your visit?: Other  Please describe your symptoms.: B12 deficiency, symptoms  Have you had these symptoms before?: Yes  How long have you been having these symptoms?: Greater than 2 weeks  Please list any medications you are currently taking for this condition.: B12 vitamin gummies and iron    Maddy is a 25-year-old female who is here today to follow-up on B12 deficiency.  She has B12 deficiency, iron deficiency anemia, and anxiety.  Her B12 and iron deficiency are thought to be due to lack of intake.   Her B12 level was 228 on 11/20/2020.  Her methylmalonic acid was elevated at 585.  She had a normal homocystine and a normal folate.  She also has iron deficiency anemia.  She is currently taking ferrous gluconate supplement.  Her last CBC 10/2020 showed improvement on her anemia with an hemoglobin and hematocrit at 11.8 and 37.3 respectively.  She started taking B12 1000 mcg oral daily since her last visit but she is very concerned about some ongoing symptoms.  She has a plethora of varying intermittent symptoms as noted in the review of systems.  She is on hydroxyzine for anxiety as needed.  At her last visit she felt good and was not having to take this but since her last visit she has had to take it several times.      The following portions of the patient's history were reviewed and updated as appropriate: allergies, current medications, past family history, past medical history, past social history, past surgical history and problem list.        Review of Systems   Constitutional: Positive for fatigue.   HENT:        Losing hair, all over scalp. No itching.   Dry scalp   Eyes: Positive for visual disturbance ( eyes feel heavy, last eye exam was > 1yr. no glasses or contacts.   "occurs when hungry or does not get enough sleep. ). Negative for blurred vision and double vision.   Cardiovascular: Positive for chest pain ( sometimes on left and sometimes on right, last a few seconds) and palpitations ( during movies/tv).   Genitourinary: Positive for dyspareunia and vaginal discharge ( malodorous, clear).   Musculoskeletal: Positive for neck pain (intermittent tightness).   Skin:        C/o brittle nails.      Neurological: Positive for dizziness, speech difficulty (tongue does a \"wierd thing\"), weakness (shaky muscles when resting on things.  muscles feel heavy), headache and memory problem (short term ).        Balance issues  C/o fuzzy brain feeling.      Psychiatric/Behavioral: Positive for decreased concentration. The patient is nervous/anxious.            Outpatient Medications Marked as Taking for the 12/11/20 encounter (Office Visit) with Vero Lopez APRN   Medication Sig Dispense Refill   • drospirenone-ethinyl estradiol (TICO,OCELLA) 3-0.03 MG per tablet Take 1 tablet by mouth Daily. 28 tablet 3   • ferrous gluconate (FERGON) 324 MG tablet Take 1 tablet by mouth 3 (Three) Times a Day With Meals. 90 tablet 2   • hydrOXYzine (ATARAX) 25 MG tablet Take 1 tablet by mouth 3 (Three) Times a Day As Needed for Anxiety. 30 tablet 1   • [DISCONTINUED] vitamin B-12 (CYANOCOBALAMIN) 1000 MCG tablet Take 1,000 mcg by mouth Daily.       Current Facility-Administered Medications for the 12/11/20 encounter (Office Visit) with Vero Lopez APRN   Medication Dose Route Frequency Provider Last Rate Last Admin   • cyanocobalamin injection 1,000 mcg  1,000 mcg Intramuscular Daily Vero Lopez APRN         No Known Allergies        Objective   Physical Exam  Constitutional:       Appearance: Normal appearance. She is well-developed. She is not ill-appearing.   HENT:      Head: Normocephalic and atraumatic.      Right Ear: Ear canal and external ear normal. No decreased hearing noted. A " middle ear effusion is present. No mastoid tenderness. Tympanic membrane is not injected, scarred, erythematous or bulging. Tympanic membrane has normal mobility.      Left Ear: Ear canal and external ear normal. No decreased hearing noted. A middle ear effusion is present. No mastoid tenderness. Tympanic membrane is not injected, scarred, erythematous or bulging. Tympanic membrane has normal mobility.      Nose: Nose normal.   Eyes:      General: Lids are normal. Vision grossly intact. Gaze aligned appropriately.      Extraocular Movements: Extraocular movements intact.      Conjunctiva/sclera: Conjunctivae normal.      Pupils: Pupils are equal, round, and reactive to light.   Neck:      Musculoskeletal: Full passive range of motion without pain and normal range of motion.      Thyroid: No thyroid mass, thyromegaly or thyroid tenderness.   Cardiovascular:      Rate and Rhythm: Normal rate and regular rhythm.      Chest Wall: PMI is not displaced. No thrill.      Heart sounds: Normal heart sounds. No murmur.   Pulmonary:      Effort: Pulmonary effort is normal.      Breath sounds: Normal breath sounds.   Abdominal:      General: Bowel sounds are normal.      Palpations: Abdomen is soft.      Tenderness: There is no abdominal tenderness.   Musculoskeletal: Normal range of motion.   Skin:     General: Skin is warm and dry.   Neurological:      General: No focal deficit present.      Mental Status: She is alert and oriented to person, place, and time.      Cranial Nerves: Cranial nerves are intact.      Sensory: Sensation is intact.      Motor: Motor function is intact.      Coordination: Coordination is intact.      Gait: Gait normal.      Deep Tendon Reflexes: Reflexes normal.   Psychiatric:         Attention and Perception: Attention normal.         Mood and Affect: Mood is anxious.         Speech: Speech normal.         Behavior: Behavior normal. Behavior is cooperative.         Thought Content: Thought content  "normal.         Cognition and Memory: Cognition and memory normal.         Judgment: Judgment normal.         Vitals:    12/11/20 0907   BP: 110/66   Pulse: 93   Resp: 16   Temp: 97.5 °F (36.4 °C)   SpO2: 98%   Weight: 62.1 kg (137 lb)   Height: 167.9 cm (66.1\")     Body mass index is 22.05 kg/m².  Wt Readings from Last 3 Encounters:   12/11/20 62.1 kg (137 lb)   11/20/20 62.1 kg (137 lb)   10/19/20 62.1 kg (137 lb)       Assessment/Plan       Diagnoses and all orders for this visit:    1. B12 deficiency (Primary)  -     Ambulatory Referral to Nutrition Services  -     cyanocobalamin injection 1,000 mcg  We will stop the B12 oral supplement and switch her over to B12 injections to see if we can get her B12 level of a little faster.(daily x5 doses, then weekly x4 doses, then monthly).  First injection in office today.  Refer to nutrition services to assist with dietary measures to maintain adequate B12  2. Iron deficiency anemia secondary to inadequate dietary iron intake  -     Ambulatory Referral to Nutrition Services  Continue iron supplement at current dose.  Can recheck labs at follow-up next month.  We will refer to nutrition services to assist with dietary sources of adequate iron intake.    3. Anxiety  Continue as needed hydroxyzine.  She does seem anxious about her health.  I discussed the possibility of referring to counselor versus putting on medications for anxiety.  She does not wish to do anything outside of the hydroxyzine at this time and feels as though once her iron and B12 a been corrected she will no longer be anxious.    4. Fluid level behind tympanic membrane of both ears  -     fluticasone (Flonase) 50 MCG/ACT nasal spray; 2 sprays into the nostril(s) as directed by provider Daily.  Dispense: 1 bottle; Refill: 3  We will start her on some Flonase.  Use and adverse effects discussed.    FU in 1 month, sooner if needed.     Return for Next scheduled follow up.    I discussed my " findings,recommendations, and plan of care was with the patient. They verbalized understanding and agreement.  Patient was encouraged to keep me informed of any acute changes, lack of improvement, or any new concerning symptoms.       * Please note that portions of this note were completed with a voice recognition program. Efforts were made to edit the dictation but occasionally words are erroneously transcribed.     After patient left the office it was brought to my attention by the medical assistant, Rosa Lai, that she had administered to the patient the wrong injection.  She was administered 4 mg of dexamethasone sodium.  , Shilpi Smith (Michelle) was notified by me.  I personally, along with Magdalena, called Maddy  and notified the her of this medication error.  I discussed the potential side effects of receiving this low-dose steroid injection.  Ultimately I feel as though patient will do rather well with this and should not experience any adverse effects/events.  Maddy was given an opportunity to ask questions and all questions were answered to her satisfaction prior to ending the call.  Maddy will return to the office later today to receive her B12 injection.  I will see her in office when she is here.  She tells me that if she cannot return to the office later today she will call us and let us know.

## 2020-12-14 ENCOUNTER — CLINICAL SUPPORT (OUTPATIENT)
Dept: INTERNAL MEDICINE | Facility: CLINIC | Age: 25
End: 2020-12-14

## 2020-12-14 DIAGNOSIS — E53.8 B12 DEFICIENCY: Primary | ICD-10-CM

## 2020-12-14 PROCEDURE — 96372 THER/PROPH/DIAG INJ SC/IM: CPT | Performed by: NURSE PRACTITIONER

## 2020-12-14 RX ORDER — CYANOCOBALAMIN 1000 UG/ML
1000 INJECTION, SOLUTION INTRAMUSCULAR; SUBCUTANEOUS ONCE
Status: COMPLETED | OUTPATIENT
Start: 2020-12-14 | End: 2020-12-14

## 2020-12-14 RX ADMIN — CYANOCOBALAMIN 1000 MCG: 1000 INJECTION, SOLUTION INTRAMUSCULAR; SUBCUTANEOUS at 15:04

## 2020-12-15 ENCOUNTER — CLINICAL SUPPORT (OUTPATIENT)
Dept: INTERNAL MEDICINE | Facility: CLINIC | Age: 25
End: 2020-12-15

## 2020-12-15 DIAGNOSIS — E53.8 B12 DEFICIENCY: ICD-10-CM

## 2020-12-15 PROCEDURE — 96372 THER/PROPH/DIAG INJ SC/IM: CPT | Performed by: NURSE PRACTITIONER

## 2020-12-15 RX ADMIN — CYANOCOBALAMIN 1000 MCG: 1000 INJECTION, SOLUTION INTRAMUSCULAR; SUBCUTANEOUS at 15:20

## 2020-12-16 ENCOUNTER — CLINICAL SUPPORT (OUTPATIENT)
Dept: INTERNAL MEDICINE | Facility: CLINIC | Age: 25
End: 2020-12-16

## 2020-12-16 DIAGNOSIS — E53.8 B12 DEFICIENCY: Primary | ICD-10-CM

## 2020-12-16 PROCEDURE — 96372 THER/PROPH/DIAG INJ SC/IM: CPT | Performed by: NURSE PRACTITIONER

## 2020-12-16 RX ORDER — CYANOCOBALAMIN 1000 UG/ML
1000 INJECTION, SOLUTION INTRAMUSCULAR; SUBCUTANEOUS ONCE
Status: COMPLETED | OUTPATIENT
Start: 2020-12-16 | End: 2020-12-16

## 2020-12-16 RX ADMIN — CYANOCOBALAMIN 1000 MCG: 1000 INJECTION, SOLUTION INTRAMUSCULAR; SUBCUTANEOUS at 13:36

## 2020-12-17 ENCOUNTER — CLINICAL SUPPORT (OUTPATIENT)
Dept: INTERNAL MEDICINE | Facility: CLINIC | Age: 25
End: 2020-12-17

## 2020-12-17 DIAGNOSIS — E53.8 B12 DEFICIENCY: Primary | ICD-10-CM

## 2020-12-17 PROCEDURE — 96372 THER/PROPH/DIAG INJ SC/IM: CPT | Performed by: INTERNAL MEDICINE

## 2020-12-17 RX ORDER — CYANOCOBALAMIN 1000 UG/ML
1000 INJECTION, SOLUTION INTRAMUSCULAR; SUBCUTANEOUS
Status: DISCONTINUED | OUTPATIENT
Start: 2020-12-17 | End: 2021-09-16

## 2020-12-17 RX ADMIN — CYANOCOBALAMIN 1000 MCG: 1000 INJECTION, SOLUTION INTRAMUSCULAR; SUBCUTANEOUS at 15:34

## 2020-12-22 ENCOUNTER — CLINICAL SUPPORT (OUTPATIENT)
Dept: INTERNAL MEDICINE | Facility: CLINIC | Age: 25
End: 2020-12-22

## 2020-12-22 DIAGNOSIS — E53.8 B12 DEFICIENCY: Primary | ICD-10-CM

## 2020-12-22 PROCEDURE — 96372 THER/PROPH/DIAG INJ SC/IM: CPT | Performed by: NURSE PRACTITIONER

## 2020-12-22 RX ORDER — CYANOCOBALAMIN 1000 UG/ML
1000 INJECTION, SOLUTION INTRAMUSCULAR; SUBCUTANEOUS ONCE
Status: COMPLETED | OUTPATIENT
Start: 2020-12-22 | End: 2020-12-22

## 2020-12-22 RX ADMIN — CYANOCOBALAMIN 1000 MCG: 1000 INJECTION, SOLUTION INTRAMUSCULAR; SUBCUTANEOUS at 15:15

## 2020-12-28 ENCOUNTER — CLINICAL SUPPORT (OUTPATIENT)
Dept: INTERNAL MEDICINE | Facility: CLINIC | Age: 25
End: 2020-12-28

## 2020-12-28 DIAGNOSIS — E53.8 B12 DEFICIENCY: Primary | ICD-10-CM

## 2020-12-28 DIAGNOSIS — D50.8 IRON DEFICIENCY ANEMIA SECONDARY TO INADEQUATE DIETARY IRON INTAKE: ICD-10-CM

## 2020-12-28 PROCEDURE — 96372 THER/PROPH/DIAG INJ SC/IM: CPT | Performed by: NURSE PRACTITIONER

## 2020-12-28 RX ORDER — CYANOCOBALAMIN 1000 UG/ML
1000 INJECTION, SOLUTION INTRAMUSCULAR; SUBCUTANEOUS ONCE
Status: COMPLETED | OUTPATIENT
Start: 2020-12-28 | End: 2020-12-28

## 2020-12-28 RX ADMIN — CYANOCOBALAMIN 1000 MCG: 1000 INJECTION, SOLUTION INTRAMUSCULAR; SUBCUTANEOUS at 12:07

## 2020-12-29 RX ORDER — DOXYCYCLINE HYCLATE 50 MG/1
324 CAPSULE, GELATIN COATED ORAL
Qty: 90 TABLET | Refills: 2 | Status: SHIPPED | OUTPATIENT
Start: 2020-12-29 | End: 2021-08-02 | Stop reason: SDUPTHER

## 2021-01-05 ENCOUNTER — CLINICAL SUPPORT (OUTPATIENT)
Dept: INTERNAL MEDICINE | Facility: CLINIC | Age: 26
End: 2021-01-05

## 2021-01-05 DIAGNOSIS — E53.8 B12 DEFICIENCY: Primary | ICD-10-CM

## 2021-01-05 PROCEDURE — 96372 THER/PROPH/DIAG INJ SC/IM: CPT | Performed by: NURSE PRACTITIONER

## 2021-01-05 RX ORDER — CYANOCOBALAMIN 1000 UG/ML
1000 INJECTION, SOLUTION INTRAMUSCULAR; SUBCUTANEOUS
Status: DISCONTINUED | OUTPATIENT
Start: 2021-01-05 | End: 2021-09-16

## 2021-01-05 RX ADMIN — CYANOCOBALAMIN 1000 MCG: 1000 INJECTION, SOLUTION INTRAMUSCULAR; SUBCUTANEOUS at 15:32

## 2021-01-13 ENCOUNTER — CLINICAL SUPPORT (OUTPATIENT)
Dept: INTERNAL MEDICINE | Facility: CLINIC | Age: 26
End: 2021-01-13

## 2021-01-13 DIAGNOSIS — E53.8 B12 DEFICIENCY: Primary | ICD-10-CM

## 2021-01-13 PROCEDURE — 96372 THER/PROPH/DIAG INJ SC/IM: CPT | Performed by: NURSE PRACTITIONER

## 2021-01-13 RX ORDER — CYANOCOBALAMIN 1000 UG/ML
1000 INJECTION, SOLUTION INTRAMUSCULAR; SUBCUTANEOUS ONCE
Status: COMPLETED | OUTPATIENT
Start: 2021-01-13 | End: 2021-01-13

## 2021-01-13 RX ADMIN — CYANOCOBALAMIN 1000 MCG: 1000 INJECTION, SOLUTION INTRAMUSCULAR; SUBCUTANEOUS at 13:27

## 2021-01-18 ENCOUNTER — OFFICE VISIT (OUTPATIENT)
Dept: INTERNAL MEDICINE | Facility: CLINIC | Age: 26
End: 2021-01-18

## 2021-01-18 VITALS
BODY MASS INDEX: 22.34 KG/M2 | HEIGHT: 66 IN | WEIGHT: 139 LBS | DIASTOLIC BLOOD PRESSURE: 64 MMHG | RESPIRATION RATE: 16 BRPM | TEMPERATURE: 97.5 F | SYSTOLIC BLOOD PRESSURE: 108 MMHG | HEART RATE: 84 BPM | OXYGEN SATURATION: 98 %

## 2021-01-18 DIAGNOSIS — F41.9 ANXIETY: ICD-10-CM

## 2021-01-18 DIAGNOSIS — D50.8 IRON DEFICIENCY ANEMIA SECONDARY TO INADEQUATE DIETARY IRON INTAKE: Chronic | ICD-10-CM

## 2021-01-18 DIAGNOSIS — F41.9 ANXIETY: Chronic | ICD-10-CM

## 2021-01-18 DIAGNOSIS — E53.8 B12 DEFICIENCY: Chronic | ICD-10-CM

## 2021-01-18 DIAGNOSIS — Z11.3 SCREEN FOR STD (SEXUALLY TRANSMITTED DISEASE): ICD-10-CM

## 2021-01-18 DIAGNOSIS — Z11.59 ENCOUNTER FOR HEPATITIS C SCREENING TEST FOR LOW RISK PATIENT: ICD-10-CM

## 2021-01-18 DIAGNOSIS — Z23 NEED FOR TDAP VACCINATION: ICD-10-CM

## 2021-01-18 DIAGNOSIS — Z00.00 ANNUAL PHYSICAL EXAM: Primary | ICD-10-CM

## 2021-01-18 PROCEDURE — 99395 PREV VISIT EST AGE 18-39: CPT | Performed by: NURSE PRACTITIONER

## 2021-01-18 PROCEDURE — 90471 IMMUNIZATION ADMIN: CPT | Performed by: NURSE PRACTITIONER

## 2021-01-18 PROCEDURE — 90715 TDAP VACCINE 7 YRS/> IM: CPT | Performed by: NURSE PRACTITIONER

## 2021-01-18 RX ORDER — ESCITALOPRAM OXALATE 5 MG/1
5 TABLET ORAL DAILY
Qty: 30 TABLET | Refills: 1 | Status: SHIPPED | OUTPATIENT
Start: 2021-01-18 | End: 2021-07-09 | Stop reason: SDDI

## 2021-01-18 NOTE — ASSESSMENT & PLAN NOTE
Anxiety is still ongoing.  She is using the as needed hydroxyzine with some relief.  She is willing to start daily preventative medications.  We will start her on Lexapro 5 mg p.o. daily.  Adverse effects and expectations discussed.

## 2021-01-18 NOTE — PROGRESS NOTES
Patient Care Team:  Vero Lopez APRN as PCP - General (Nurse Practitioner)     Chief complaint: Patient is in today for a physical        Patient is a 25 y.o. female who presents for her. yearly physical exam.    Maddy is here today to also follow-up on iron deficiency anemia, B12 deficiency, and anxiety.  At her last visit she was experiencing a multitude of symptoms including heavy feeling to her eyes, chest discomfort on the right that lasted a few seconds, palpitations during movies or TV, neck pain/tightness, dizziness, weird sensation with tongue, weakness or shaky muscles when resting on things and feeling of heavy muscles, headaches, and difficulty with short-term memory or a complaint of fuzzy brain.  At that visit we switched her over to B12 injections.  Her last B12 injection was 1/13/2020  We also referred her to see a nutritionist.    For her iron deficiency we continued her iron supplementation and had plans of rechecking labs at this visit.  We discussed anxiety at length and she was reluctant to take anything for anxiety outside of the as needed hydroxyzine.  Today she reports her anxiety is  Not any better   Feels much better overall but still having some anxiety.   Would like to try anxiety medications.   She has been takgin the hydroxyzoine more often since last visit and it helps        Female  Problem  The patient's primary symptoms include a genital odor, pelvic pain, vaginal bleeding and vaginal discharge. The patient's pertinent negatives include no genital itching, genital lesions, genital rash or missed menses. This is a recurrent problem. The current episode started more than 1 year ago. The problem occurs daily. The problem has been waxing and waning. The pain is mild. She is not pregnant. Associated symptoms include painful intercourse. Pertinent negatives include no abdominal pain, anorexia, back pain, constipation, diarrhea, dysuria, hematuria, joint pain, joint swelling,  sore throat, urgency or vomiting. The vaginal discharge was copious, malodorous, watery and yellow. The vaginal bleeding is spotting. She has not been passing clots. She has not been passing tissue. The symptoms are aggravated by bowel movements and intercourse. She is sexually active. No, her partner does not have an STD. She uses oral contraceptives for contraception. Her menstrual history has been regular.        Health maintenance/lifestyle:  Health Maintenance   Topic Date Due   • HPV VACCINES (1 - 2-dose series) 01/26/2006   • TDAP/TD VACCINES (1 - Tdap) 01/26/2014   • HEPATITIS C SCREENING  09/18/2020   • PAP SMEAR  09/18/2020   • ANNUAL PHYSICAL  01/19/2022   • INFLUENZA VACCINE  Completed   • Pneumococcal Vaccine 0-64  Aged Out   • MENINGOCOCCAL VACCINE  Aged Out       Immunization History   Administered Date(s) Administered   • Flulaval/Fluarix/Fluzone Quad 09/18/2020   • Tdap 01/18/2021     Influenza: utd  Tetanus: unknown    Hep C screening: never, denies high risk behaviors  Self breast exam: does  Not do.     Pap: due   STI concerns: has some vaginal discharge that is dark yellow. Has to wear panty liner.   She is sexually active with one partner . Does not use condoms. Partner does not have STD symptoms.   Occasionally has mild pain during intercourse. Does not fees as  Though this is position related or watts to vaginal dryness.  Menses:  regular  Patient's last menstrual period was 12/22/2020 (approximate).  Contraception: oral. Satisfied with current med. Dose not miss pills.   She has no history of migraine with aura, breast cancer, thromboembolic disease, cardiac or cerebrovascular disease, or uncontrolled hypertension.    Eye exam: DUE    Dental exam: DUE    Seatbelt use: wears    Diet: lots of [employer] this last month.   Exercise: not much, but does stand a lot at work.       Social History     Tobacco Use   Smoking Status Former Smoker   • Types: Cigarettes   • Start date: 5/1/2020   • Quit  date: 8/15/2020   • Years since quittin.4   Smokeless Tobacco Never Used   Tobacco Comment    smoked for 5 years  (black and milds for 5 years then restarted 2020 and quit 2020     Social History     Substance and Sexual Activity   Alcohol Use Never   • Frequency: Never       PHQ-2 Depression Screening  Little interest or pleasure in doing things? 1   Feeling down, depressed, or hopeless? 0   PHQ-2 Total Score 1         Review of Systems - as noted in HPI.     History  Past Medical History:   Diagnosis Date   • Anxiety       Past Surgical History:   Procedure Laterality Date   • NO PAST SURGERIES        No Known Allergies   Family History   Adopted: Yes     Social History     Socioeconomic History   • Marital status: Single     Spouse name: Not on file   • Number of children: 0   • Years of education: Not on file   • Highest education level: Not on file   Occupational History   • Occupation: fabi   Social Needs   • Financial resource strain: Not hard at all   • Food insecurity     Worry: Never true     Inability: Never true   • Transportation needs     Medical: No     Non-medical: No   Tobacco Use   • Smoking status: Former Smoker     Types: Cigarettes     Start date: 2020     Quit date: 8/15/2020     Years since quittin.4   • Smokeless tobacco: Never Used   • Tobacco comment: smoked for 5 years  (black and milds for 5 years then restarted 2020 and quit 2020   Substance and Sexual Activity   • Alcohol use: Never     Frequency: Never   • Drug use: Never   • Sexual activity: Yes     Birth control/protection: OCP   Social History Narrative    Lives with Roomate        Current Outpatient Medications:   •  drospirenone-ethinyl estradiol (TICO,OCELLA) 3-0.03 MG per tablet, Take 1 tablet by mouth Daily., Disp: 28 tablet, Rfl: 3  •  ferrous gluconate (FERGON) 324 MG tablet, Take 1 tablet by mouth 3 (Three) Times a Day With Meals., Disp: 90 tablet, Rfl: 2  •  fluticasone (Flonase) 50 MCG/ACT nasal  "spray, 2 sprays into the nostril(s) as directed by provider Daily., Disp: 1 bottle, Rfl: 3  •  hydrOXYzine (ATARAX) 25 MG tablet, Take 1 tablet by mouth 3 (Three) Times a Day As Needed for Anxiety., Disp: 30 tablet, Rfl: 1  •  escitalopram (Lexapro) 5 MG tablet, Take 1 tablet by mouth Daily., Disp: 30 tablet, Rfl: 1    Current Facility-Administered Medications:   •  cyanocobalamin injection 1,000 mcg, 1,000 mcg, Intramuscular, Q28 Days, Elizabet Vela MD, 1,000 mcg at 12/17/20 1534  •  cyanocobalamin injection 1,000 mcg, 1,000 mcg, Intramuscular, Q28 Days, Vero Lopez APRN, 1,000 mcg at 01/05/21 1532                  /64   Pulse 84   Temp 97.5 °F (36.4 °C)   Resp 16   Ht 167.9 cm (66.1\")   Wt 63 kg (139 lb)   LMP 12/22/2020 (Approximate)   SpO2 98%   Breastfeeding No   BMI 22.37 kg/m²       Physical Exam  Vitals signs and nursing note reviewed. Exam conducted with a chaperone present.   Constitutional:       General: She is not in acute distress.     Appearance: Normal appearance. She is well-developed. She is not diaphoretic.   HENT:      Head: Normocephalic and atraumatic.      Right Ear: External ear normal.      Left Ear: External ear normal.      Mouth/Throat:      Pharynx: No oropharyngeal exudate.   Eyes:      General: No scleral icterus.        Right eye: No discharge.         Left eye: No discharge.      Conjunctiva/sclera: Conjunctivae normal.   Neck:      Musculoskeletal: Normal range of motion and neck supple.      Thyroid: No thyroid mass, thyromegaly or thyroid tenderness.      Vascular: Normal carotid pulses. No carotid bruit or JVD.      Trachea: No tracheal deviation.   Cardiovascular:      Rate and Rhythm: Normal rate and regular rhythm.      Heart sounds: Normal heart sounds. No murmur. No friction rub.   Pulmonary:      Effort: Pulmonary effort is normal. No respiratory distress.      Breath sounds: Normal breath sounds. No wheezing or rales.   Chest:      Chest wall: No " tenderness.   Abdominal:      General: Bowel sounds are normal. There is no distension.      Palpations: Abdomen is soft. There is no mass.      Tenderness: There is no abdominal tenderness.      Hernia: No hernia is present. There is no hernia in the left inguinal area or right inguinal area.   Genitourinary:     Exam position: Supine.      Labia:         Right: No rash, tenderness, lesion or injury.         Left: No rash, tenderness, lesion or injury.       Vagina: No signs of injury and foreign body. Vaginal discharge present. No erythema, tenderness, lesions or prolapsed vaginal walls.      Cervix: Erythema present. No cervical motion tenderness, discharge, friability, lesion or cervical bleeding.      Uterus: Normal.       Adnexa: Right adnexa normal and left adnexa normal.      Rectum: No external hemorrhoid.   Musculoskeletal: Normal range of motion.         General: No tenderness or deformity.   Lymphadenopathy:      Cervical: No cervical adenopathy.      Lower Body: No right inguinal adenopathy. No left inguinal adenopathy.   Skin:     General: Skin is warm and dry.      Coloration: Skin is not pale.      Findings: No erythema or rash.   Neurological:      Mental Status: She is alert and oriented to person, place, and time.      Deep Tendon Reflexes: Reflexes are normal and symmetric. Reflexes normal.   Psychiatric:         Behavior: Behavior normal.         Thought Content: Thought content normal.                   Diagnoses and all orders for this visit:    1. Annual physical exam (Primary)  -     CBC (No Diff); Future  -     Comprehensive Metabolic Panel; Future  -     Vitamin B12; Future  -     Liquid-based Pap Smear, Screening; Future    2. B12 deficiency  Assessment & Plan:  Symptoms improving with injections.  Continue B12 injections monthly.    Orders:  -     CBC (No Diff); Future  -     Vitamin B12; Future    3. Iron deficiency anemia secondary to inadequate dietary iron intake  Assessment &  Plan:  Continue iron supplementation.  We will recheck ferritin and CBC.    Orders:  -     CBC (No Diff); Future  -     Ferritin; Future    4. Anxiety  Assessment & Plan:  Anxiety is still ongoing.  She is using the as needed hydroxyzine with some relief.  She is willing to start daily preventative medications.  We will start her on Lexapro 5 mg p.o. daily.  Adverse effects and expectations discussed.      Orders:  -     escitalopram (Lexapro) 5 MG tablet; Take 1 tablet by mouth Daily.  Dispense: 30 tablet; Refill: 1    5. Screen for STD (sexually transmitted disease)  -     Liquid-based Pap Smear, Screening; Future  -     Hepatitis Panel, Acute; Future  -     HIV-1 / O / 2 Ag / Antibody 4th Generation; Future  -     HSV 1 & 2 IgM, Antibodies, Indirect; Future  -     HSV 1 & 2 - Specific Antibody, IgG; Future  -     RPR; Future    6. Encounter for hepatitis C screening test for low risk patient    7. Need for Tdap vaccination  -     Tdap Vaccine Greater Than or Equal To 8yo IM    Other orders  -     Cancel: Lipid Panel; Future  -     Cancel: HCV Antibody Rfx To Qnt PCR; Future       Labs  ordered as above- will notify of results and treat accordingly. If patient has not received results within one week of collection via mychart or letter, they will notify my office  Immunizations and screenings  Hep c screening ordered, tdap updated, she will schedule dental and vision exams, pap competed with STI screening.   Other preventative/ screenings are UTD as note din HPI  Counseling: The patient is advised to begin progressive daily aerobic exercise program, reduce exposure to stress and return for routine annual checkups  Follow up: Return in about 4 weeks (around 2/15/2021) for Recheck, future labs ordered.  Plan of care discussed with pt. They verbalized understanding and agreement.     Vero Lopez, APRN   1/18/2021   12:15 EST              * Please note that portions of this note were completed with a voice  recognition program. Efforts were made to edit the dictation but occasionally words are erroneously transcribed.

## 2021-01-19 RX ORDER — HYDROXYZINE HYDROCHLORIDE 25 MG/1
25 TABLET, FILM COATED ORAL 3 TIMES DAILY PRN
Qty: 30 TABLET | Refills: 1 | Status: SHIPPED | OUTPATIENT
Start: 2021-01-19 | End: 2021-04-19 | Stop reason: SDUPTHER

## 2021-01-19 NOTE — TELEPHONE ENCOUNTER
Last Office Visit: 01/18/2021  Next Office Visit: none scheduled     Labs completed in past 6 months? yes  Labs completed in past year? yes    Last Refill Date: 10/21/2020  Quantity: 30  Refills: 1    Pharmacy:   [facility] [address] [place] [address] - [address] [place] [address] - [phone]  - [phone] FX   Phone:  [phone]   Fax:  [phone]   Address:  [address] [place] [address]

## 2021-01-24 DIAGNOSIS — B96.89 BV (BACTERIAL VAGINOSIS): Primary | ICD-10-CM

## 2021-01-24 DIAGNOSIS — N76.0 BV (BACTERIAL VAGINOSIS): Primary | ICD-10-CM

## 2021-01-24 RX ORDER — METRONIDAZOLE 7.5 MG/G
GEL VAGINAL NIGHTLY
Qty: 70 G | Refills: 0 | Status: SHIPPED | OUTPATIENT
Start: 2021-01-24 | End: 2021-01-29

## 2021-01-24 NOTE — PROGRESS NOTES
"MyChart comments sent to patient with results: \"Your Pap smear does not show any evidence of malignancies but does show evidence of bacterial vaginosis which is a vaginal bacteria overgrowth that is not sexually spread.  I will send in some metronidazole gel for you to insert vaginally every night for 5 nights to treat this.  There is no need for retesting after bacterial vaginosis unless symptoms are ongoing.\""

## 2021-02-17 ENCOUNTER — LAB (OUTPATIENT)
Dept: LAB | Facility: HOSPITAL | Age: 26
End: 2021-02-17

## 2021-02-17 ENCOUNTER — CLINICAL SUPPORT (OUTPATIENT)
Dept: INTERNAL MEDICINE | Facility: CLINIC | Age: 26
End: 2021-02-17

## 2021-02-17 DIAGNOSIS — E53.8 B12 DEFICIENCY: Chronic | ICD-10-CM

## 2021-02-17 DIAGNOSIS — Z11.3 SCREEN FOR STD (SEXUALLY TRANSMITTED DISEASE): ICD-10-CM

## 2021-02-17 DIAGNOSIS — E53.8 B12 DEFICIENCY: Primary | ICD-10-CM

## 2021-02-17 DIAGNOSIS — Z00.00 ANNUAL PHYSICAL EXAM: ICD-10-CM

## 2021-02-17 DIAGNOSIS — D50.8 IRON DEFICIENCY ANEMIA SECONDARY TO INADEQUATE DIETARY IRON INTAKE: Chronic | ICD-10-CM

## 2021-02-17 LAB
ALBUMIN SERPL-MCNC: 4.2 G/DL (ref 3.5–5.2)
ALBUMIN/GLOB SERPL: 1.6 G/DL
ALP SERPL-CCNC: 47 U/L (ref 39–117)
ALT SERPL W P-5'-P-CCNC: 9 U/L (ref 1–33)
ANION GAP SERPL CALCULATED.3IONS-SCNC: 10.5 MMOL/L (ref 5–15)
AST SERPL-CCNC: 24 U/L (ref 1–32)
BILIRUB SERPL-MCNC: <0.2 MG/DL (ref 0–1.2)
BUN SERPL-MCNC: 12 MG/DL (ref 6–20)
BUN/CREAT SERPL: 19.4 (ref 7–25)
CALCIUM SPEC-SCNC: 10 MG/DL (ref 8.6–10.5)
CHLORIDE SERPL-SCNC: 105 MMOL/L (ref 98–107)
CO2 SERPL-SCNC: 23.5 MMOL/L (ref 22–29)
CREAT SERPL-MCNC: 0.62 MG/DL (ref 0.57–1)
DEPRECATED RDW RBC AUTO: 37.6 FL (ref 37–54)
ERYTHROCYTE [DISTWIDTH] IN BLOOD BY AUTOMATED COUNT: 13.5 % (ref 12.3–15.4)
FERRITIN SERPL-MCNC: 36.7 NG/ML (ref 13–150)
GFR SERPL CREATININE-BSD FRML MDRD: 141 ML/MIN/1.73
GLOBULIN UR ELPH-MCNC: 2.7 GM/DL
GLUCOSE SERPL-MCNC: 83 MG/DL (ref 65–99)
HAV IGM SERPL QL IA: NORMAL
HBV CORE IGM SERPL QL IA: NORMAL
HBV SURFACE AG SERPL QL IA: NORMAL
HCT VFR BLD AUTO: 37.4 % (ref 34–46.6)
HCV AB SER DONR QL: NORMAL
HGB BLD-MCNC: 12.5 G/DL (ref 12–15.9)
HIV1+2 AB SER QL: NORMAL
MCH RBC QN AUTO: 26 PG (ref 26.6–33)
MCHC RBC AUTO-ENTMCNC: 33.4 G/DL (ref 31.5–35.7)
MCV RBC AUTO: 77.9 FL (ref 79–97)
PLATELET # BLD AUTO: 215 10*3/MM3 (ref 140–450)
PMV BLD AUTO: 10.9 FL (ref 6–12)
POTASSIUM SERPL-SCNC: 3.8 MMOL/L (ref 3.5–5.2)
PROT SERPL-MCNC: 6.9 G/DL (ref 6–8.5)
RBC # BLD AUTO: 4.8 10*6/MM3 (ref 3.77–5.28)
RPR SER QL: NORMAL
SODIUM SERPL-SCNC: 139 MMOL/L (ref 136–145)
VIT B12 BLD-MCNC: 481 PG/ML (ref 211–946)
WBC # BLD AUTO: 5.07 10*3/MM3 (ref 3.4–10.8)

## 2021-02-17 PROCEDURE — 82728 ASSAY OF FERRITIN: CPT

## 2021-02-17 PROCEDURE — 80074 ACUTE HEPATITIS PANEL: CPT

## 2021-02-17 PROCEDURE — 86695 HERPES SIMPLEX TYPE 1 TEST: CPT | Performed by: NURSE PRACTITIONER

## 2021-02-17 PROCEDURE — G0432 EIA HIV-1/HIV-2 SCREEN: HCPCS

## 2021-02-17 PROCEDURE — 86592 SYPHILIS TEST NON-TREP QUAL: CPT

## 2021-02-17 PROCEDURE — 85027 COMPLETE CBC AUTOMATED: CPT

## 2021-02-17 PROCEDURE — 82607 VITAMIN B-12: CPT

## 2021-02-17 PROCEDURE — 86696 HERPES SIMPLEX TYPE 2 TEST: CPT | Performed by: NURSE PRACTITIONER

## 2021-02-17 PROCEDURE — 96372 THER/PROPH/DIAG INJ SC/IM: CPT | Performed by: NURSE PRACTITIONER

## 2021-02-17 PROCEDURE — 80053 COMPREHEN METABOLIC PANEL: CPT

## 2021-02-17 RX ORDER — CYANOCOBALAMIN 1000 UG/ML
1000 INJECTION, SOLUTION INTRAMUSCULAR; SUBCUTANEOUS ONCE
Status: COMPLETED | OUTPATIENT
Start: 2021-02-17 | End: 2021-02-17

## 2021-02-17 RX ADMIN — CYANOCOBALAMIN 1000 MCG: 1000 INJECTION, SOLUTION INTRAMUSCULAR; SUBCUTANEOUS at 14:56

## 2021-02-19 DIAGNOSIS — D50.8 IRON DEFICIENCY ANEMIA SECONDARY TO INADEQUATE DIETARY IRON INTAKE: ICD-10-CM

## 2021-02-19 DIAGNOSIS — F41.9 ANXIETY: ICD-10-CM

## 2021-02-19 DIAGNOSIS — H65.93 FLUID LEVEL BEHIND TYMPANIC MEMBRANE OF BOTH EARS: ICD-10-CM

## 2021-02-19 DIAGNOSIS — Z30.41 ENCOUNTER FOR SURVEILLANCE OF CONTRACEPTIVE PILLS: ICD-10-CM

## 2021-02-19 LAB
HSV1 IGG SER IA-ACNC: <0.91 INDEX (ref 0–0.9)
HSV2 IGG SER IA-ACNC: <0.91 INDEX (ref 0–0.9)

## 2021-02-19 RX ORDER — HYDROXYZINE HYDROCHLORIDE 25 MG/1
25 TABLET, FILM COATED ORAL 3 TIMES DAILY PRN
Qty: 30 TABLET | Refills: 1 | OUTPATIENT
Start: 2021-02-19

## 2021-02-19 RX ORDER — DOXYCYCLINE HYCLATE 50 MG/1
324 CAPSULE, GELATIN COATED ORAL
Qty: 90 TABLET | Refills: 2 | OUTPATIENT
Start: 2021-02-19

## 2021-02-19 RX ORDER — FLUTICASONE PROPIONATE 50 MCG
2 SPRAY, SUSPENSION (ML) NASAL DAILY
OUTPATIENT
Start: 2021-02-19

## 2021-02-19 RX ORDER — DROSPIRENONE AND ETHINYL ESTRADIOL 0.03MG-3MG
1 KIT ORAL DAILY
Qty: 28 TABLET | Refills: 10 | Status: SHIPPED | OUTPATIENT
Start: 2021-02-19 | End: 2021-10-31 | Stop reason: SDUPTHER

## 2021-02-22 LAB
HSV1 IGM TITR SER IF: NORMAL TITER
HSV2 IGM TITR SER IF: NORMAL TITER

## 2021-02-23 NOTE — PROGRESS NOTES
Sammy comments sent to patient with results: Your labs are looking pretty good.    They show that you do NOT have herpes, HIV, syphilis, hepatitis,    your B12 is improving,  Your iron and blood counts are improving  Continue your current dosing of your B12 and iron     we can follow-up in 2 months

## 2021-03-12 ENCOUNTER — CLINICAL SUPPORT (OUTPATIENT)
Dept: INTERNAL MEDICINE | Facility: CLINIC | Age: 26
End: 2021-03-12

## 2021-03-12 DIAGNOSIS — E53.8 B12 DEFICIENCY: Primary | ICD-10-CM

## 2021-03-12 PROCEDURE — 96372 THER/PROPH/DIAG INJ SC/IM: CPT | Performed by: NURSE PRACTITIONER

## 2021-03-12 RX ORDER — CYANOCOBALAMIN 1000 UG/ML
1000 INJECTION, SOLUTION INTRAMUSCULAR; SUBCUTANEOUS ONCE
Status: COMPLETED | OUTPATIENT
Start: 2021-03-12 | End: 2021-03-12

## 2021-03-12 RX ADMIN — CYANOCOBALAMIN 1000 MCG: 1000 INJECTION, SOLUTION INTRAMUSCULAR; SUBCUTANEOUS at 13:52

## 2021-04-13 ENCOUNTER — CLINICAL SUPPORT (OUTPATIENT)
Dept: INTERNAL MEDICINE | Facility: CLINIC | Age: 26
End: 2021-04-13

## 2021-04-13 DIAGNOSIS — E53.8 B12 DEFICIENCY: Primary | ICD-10-CM

## 2021-04-13 PROCEDURE — 96372 THER/PROPH/DIAG INJ SC/IM: CPT | Performed by: NURSE PRACTITIONER

## 2021-04-13 RX ORDER — CYANOCOBALAMIN 1000 UG/ML
1000 INJECTION, SOLUTION INTRAMUSCULAR; SUBCUTANEOUS ONCE
Status: COMPLETED | OUTPATIENT
Start: 2021-04-13 | End: 2021-04-13

## 2021-04-13 RX ADMIN — CYANOCOBALAMIN 1000 MCG: 1000 INJECTION, SOLUTION INTRAMUSCULAR; SUBCUTANEOUS at 15:57

## 2021-04-19 DIAGNOSIS — Z30.41 ENCOUNTER FOR SURVEILLANCE OF CONTRACEPTIVE PILLS: ICD-10-CM

## 2021-04-19 DIAGNOSIS — F41.9 ANXIETY: ICD-10-CM

## 2021-04-19 RX ORDER — DROSPIRENONE AND ETHINYL ESTRADIOL 0.03MG-3MG
1 KIT ORAL DAILY
Qty: 28 TABLET | Refills: 10 | OUTPATIENT
Start: 2021-04-19

## 2021-04-19 NOTE — TELEPHONE ENCOUNTER
Last seen 1/18.  Last filled 1/19 with 1 refill.  No upcoming appointment.    LM to schedule appointment    Marielena was sent on 2/19 with 10 refills.

## 2021-04-20 RX ORDER — HYDROXYZINE HYDROCHLORIDE 25 MG/1
25 TABLET, FILM COATED ORAL 3 TIMES DAILY PRN
Qty: 90 TABLET | Refills: 0 | Status: SHIPPED | OUTPATIENT
Start: 2021-04-20 | End: 2021-08-10 | Stop reason: SDUPTHER

## 2021-05-21 ENCOUNTER — CLINICAL SUPPORT (OUTPATIENT)
Dept: INTERNAL MEDICINE | Facility: CLINIC | Age: 26
End: 2021-05-21

## 2021-05-21 DIAGNOSIS — E53.8 B12 DEFICIENCY: Primary | ICD-10-CM

## 2021-05-21 PROCEDURE — 96372 THER/PROPH/DIAG INJ SC/IM: CPT | Performed by: NURSE PRACTITIONER

## 2021-05-21 RX ORDER — CYANOCOBALAMIN 1000 UG/ML
1000 INJECTION, SOLUTION INTRAMUSCULAR; SUBCUTANEOUS ONCE
Status: COMPLETED | OUTPATIENT
Start: 2021-05-21 | End: 2021-05-21

## 2021-05-21 RX ADMIN — CYANOCOBALAMIN 1000 MCG: 1000 INJECTION, SOLUTION INTRAMUSCULAR; SUBCUTANEOUS at 12:14

## 2021-06-28 ENCOUNTER — CLINICAL SUPPORT (OUTPATIENT)
Dept: INTERNAL MEDICINE | Facility: CLINIC | Age: 26
End: 2021-06-28

## 2021-06-28 DIAGNOSIS — E53.8 B12 DEFICIENCY: Primary | ICD-10-CM

## 2021-06-28 PROCEDURE — 96372 THER/PROPH/DIAG INJ SC/IM: CPT | Performed by: NURSE PRACTITIONER

## 2021-06-28 RX ORDER — CYANOCOBALAMIN 1000 UG/ML
1000 INJECTION, SOLUTION INTRAMUSCULAR; SUBCUTANEOUS
Status: DISCONTINUED | OUTPATIENT
Start: 2021-06-28 | End: 2021-09-16

## 2021-06-28 RX ADMIN — CYANOCOBALAMIN 1000 MCG: 1000 INJECTION, SOLUTION INTRAMUSCULAR; SUBCUTANEOUS at 12:00

## 2021-07-09 ENCOUNTER — OFFICE VISIT (OUTPATIENT)
Dept: INTERNAL MEDICINE | Facility: CLINIC | Age: 26
End: 2021-07-09

## 2021-07-09 VITALS
RESPIRATION RATE: 18 BRPM | HEIGHT: 66 IN | OXYGEN SATURATION: 98 % | WEIGHT: 164 LBS | DIASTOLIC BLOOD PRESSURE: 76 MMHG | BODY MASS INDEX: 26.36 KG/M2 | TEMPERATURE: 97.1 F | HEART RATE: 92 BPM | SYSTOLIC BLOOD PRESSURE: 118 MMHG

## 2021-07-09 DIAGNOSIS — R45.4 DIFFICULTY CONTROLLING ANGER: ICD-10-CM

## 2021-07-09 DIAGNOSIS — F41.9 ANXIETY: Primary | Chronic | ICD-10-CM

## 2021-07-09 PROCEDURE — 99213 OFFICE O/P EST LOW 20 MIN: CPT | Performed by: NURSE PRACTITIONER

## 2021-07-09 NOTE — PROGRESS NOTES
Maddy Chisholm presents to [facility] for     Chief Complaint  anger issue (discuss psychiatry referral)    Subjective          Maddy is a 26-year-old female who is here today to discuss some ongoing anger issues and is requesting a referral to psychiatry.  She has had some issues controlling her anger all her life.   Historically, she saw a counselor in [place].   She tells me that she and her boyfriend fight a lot. Denies physical altercations.  She tells me she feels safe with him.  She does have some ongoing anxiety.  She has been using as needed hydroxyzine for this to help her sleep.   She tells me that she does have some occasional palpitations when she is upset or anxious.     Anxiety  Presents for follow-up visit. Symptoms include irritability, muscle tension, nervous/anxious behavior, palpitations and restlessness. Patient reports no chest pain, compulsions, confusion, decreased concentration, depressed mood, excessive worry, feeling of choking, insomnia, obsessions, shortness of breath or suicidal ideas. Symptoms occur most days. The severity of symptoms is interfering with daily activities. The quality of sleep is fair. Nighttime awakenings: occasional.              No Known Allergies  Current Outpatient Medications on File Prior to Visit   Medication Sig Dispense Refill   • drospirenone-ethinyl estradiol (TICO,OCELLA) 3-0.03 MG per tablet Take 1 tablet by mouth Daily. 28 tablet 10   • ferrous gluconate (FERGON) 324 MG tablet Take 1 tablet by mouth 3 (Three) Times a Day With Meals. 90 tablet 2   • fluticasone (Flonase) 50 MCG/ACT nasal spray 2 sprays into the nostril(s) as directed by provider Daily. 1 bottle 3   • hydrOXYzine (ATARAX) 25 MG tablet Take 1 tablet by mouth 3 (Three) Times a Day As Needed for Anxiety. 90 tablet 0     Current Facility-Administered Medications on File Prior to Visit   Medication Dose Route Frequency Provider Last Rate Last Admin   •  "cyanocobalamin injection 1,000 mcg  1,000 mcg Intramuscular Q28 Days Elizabet Vela MD   1,000 mcg at 12/17/20 1534   • cyanocobalamin injection 1,000 mcg  1,000 mcg Intramuscular Q28 Days Vero Lopez APRN   1,000 mcg at 01/05/21 1532   • cyanocobalamin injection 1,000 mcg  1,000 mcg Intramuscular Q28 Days Vero Lopez, APRN   1,000 mcg at 06/28/21 1200         The following portions of the patient's history were reviewed and updated as appropriate: allergies, current medications, past family history, past medical history, past social history, past surgical history and problem list and are available for review within electronic record    Objective     Vital Signs:   /76   Pulse 92   Temp 97.1 °F (36.2 °C)   Resp 18   Ht 167.9 cm (66.1\")   Wt 74.4 kg (164 lb)   SpO2 98%   BMI 26.39 kg/m²         Physical Exam  Constitutional:       Appearance: Normal appearance. She is well-developed.   HENT:      Head: Normocephalic and atraumatic.   Eyes:      Conjunctiva/sclera: Conjunctivae normal.      Pupils: Pupils are equal, round, and reactive to light.   Cardiovascular:      Rate and Rhythm: Normal rate and regular rhythm.      Heart sounds: Normal heart sounds.   Pulmonary:      Effort: Pulmonary effort is normal.      Breath sounds: Normal breath sounds.   Abdominal:      General: Bowel sounds are normal.      Palpations: Abdomen is soft.      Tenderness: There is no abdominal tenderness.   Musculoskeletal:         General: Normal range of motion.      Cervical back: Normal range of motion.   Skin:     General: Skin is warm and dry.   Neurological:      Mental Status: She is alert and oriented to person, place, and time.   Psychiatric:         Attention and Perception: Attention and perception normal.         Mood and Affect: Mood and affect normal.         Speech: Speech normal.         Behavior: Behavior normal.         Thought Content: Thought content normal.         Cognition and Memory: " Cognition and memory normal.         Judgment: Judgment normal.          Result Review :     The following data was reviewed by: ARVIN Valle on 07/09/2021:  CMP    CMP 9/18/20 2/17/21   Glucose 95 83   BUN 13 12   Creatinine 0.75 0.62   eGFR African Am 114 141   Sodium 138 139   Potassium 4.6 3.8   Chloride 106 105   Calcium 9.8 10.0   Albumin 4.30 4.20   Total Bilirubin <0.2 <0.2   Alkaline Phosphatase 53 47   AST (SGOT) 20 24   ALT (SGPT) 8 9           CBC w/diff    CBC w/Diff 9/18/20 10/19/20 2/17/21   WBC 6.48 4.68 5.07   RBC 5.13 5.36 (A) 4.80   Hemoglobin 10.1 (A) 11.8 (A) 12.5   Hematocrit 33.6 (A) 37.3 37.4   MCV 65.5 (A) 69.6 (A) 77.9 (A)   MCH 19.7 (A) 22.0 (A) 26.0 (A)   MCHC 30.1 (A) 31.6 33.4   RDW 17.1 (A) 23.1 (A) 13.5   Platelets 270 233 215   (A) Abnormal value            TSH    TSH 9/18/20   TSH 2.660                           Assessment and Plan      Diagnoses and all orders for this visit:    1. Anxiety (Primary)  -     Ambulatory Referral to Psychiatry    2. Difficulty controlling anger  -     Ambulatory Referral to Psychiatry      Maddy and SONYA discussed medications for her anxiety, stress reduction, and referral to psychiatry.  She would like to continue the as needed hydroxyzine and see a counselor and psychiatrist.  Referral placed.  She needs anything now she will let me know.  We will also have her check in about 6 weeks to reassess.    Follow Up     Patient was given instructions and counseling regarding her condition or for health maintenance advice. Please see specific information pulled into the AVS if appropriate.   Any new medication's adverse effects have been discussed in detail with patient  Patient was encouraged to keep me informed of any acute changes, lack of improvement, or any new concerning symptoms.    Return in about 6 weeks (around 8/20/2021) for chronic condition follow up.    * Please note that portions of this note were completed with a voice recognition  program. Efforts were made to edit the dictation but occasionally words are erroneously transcribed.

## 2021-07-30 ENCOUNTER — CLINICAL SUPPORT (OUTPATIENT)
Dept: INTERNAL MEDICINE | Facility: CLINIC | Age: 26
End: 2021-07-30

## 2021-07-30 DIAGNOSIS — N76.0 BV (BACTERIAL VAGINOSIS): Primary | ICD-10-CM

## 2021-07-30 DIAGNOSIS — E53.8 B12 DEFICIENCY: ICD-10-CM

## 2021-07-30 DIAGNOSIS — B96.89 BV (BACTERIAL VAGINOSIS): Primary | ICD-10-CM

## 2021-07-30 PROCEDURE — 96372 THER/PROPH/DIAG INJ SC/IM: CPT | Performed by: NURSE PRACTITIONER

## 2021-07-30 RX ORDER — CYANOCOBALAMIN 1000 UG/ML
1000 INJECTION, SOLUTION INTRAMUSCULAR; SUBCUTANEOUS
Status: DISCONTINUED | OUTPATIENT
Start: 2021-07-30 | End: 2021-09-16

## 2021-07-30 RX ADMIN — CYANOCOBALAMIN 1000 MCG: 1000 INJECTION, SOLUTION INTRAMUSCULAR; SUBCUTANEOUS at 09:35

## 2021-08-02 DIAGNOSIS — F41.9 ANXIETY: ICD-10-CM

## 2021-08-02 DIAGNOSIS — H65.93 FLUID LEVEL BEHIND TYMPANIC MEMBRANE OF BOTH EARS: ICD-10-CM

## 2021-08-02 DIAGNOSIS — D50.8 IRON DEFICIENCY ANEMIA SECONDARY TO INADEQUATE DIETARY IRON INTAKE: ICD-10-CM

## 2021-08-04 RX ORDER — DOXYCYCLINE HYCLATE 50 MG/1
324 CAPSULE, GELATIN COATED ORAL
Qty: 90 TABLET | Refills: 0 | Status: SHIPPED | OUTPATIENT
Start: 2021-08-04 | End: 2022-08-01

## 2021-08-04 NOTE — TELEPHONE ENCOUNTER
Last Office Visit: 07/09/2021  Next Office Visit: 08/06/2021    Labs completed in past 6 months? yes  Labs completed in past year? yes        Pharmacy: [facility] [address] [place] [address] - [address] - [phone] [facility] [phone]    [address] [address]   Phone:  [phone]  Fax:  [phone]    Please review pended refill request for any changes needed on refills or quantities. Thank you!

## 2021-08-06 NOTE — TELEPHONE ENCOUNTER
Last Office Visit: 07/09/2021  Next Office Visit: not scheduled    Labs completed in past 6 months? yes  Labs completed in past year? yes    Last Refill Date: 04/20/2021  Quantity:90  Refills:0    Pharmacy:   [facility] [address] [place] [address] [address] [place] [address] [phone]  - [phone] [address] [address]

## 2021-08-10 ENCOUNTER — TELEMEDICINE (OUTPATIENT)
Dept: PSYCHIATRY | Facility: CLINIC | Age: 26
End: 2021-08-10

## 2021-08-10 DIAGNOSIS — F33.1 MAJOR DEPRESSIVE DISORDER, RECURRENT EPISODE, MODERATE (HCC): Chronic | ICD-10-CM

## 2021-08-10 DIAGNOSIS — F41.9 ANXIETY DISORDER, UNSPECIFIED TYPE: Primary | Chronic | ICD-10-CM

## 2021-08-10 PROCEDURE — 90792 PSYCH DIAG EVAL W/MED SRVCS: CPT | Performed by: NURSE PRACTITIONER

## 2021-08-10 RX ORDER — FLUOXETINE 10 MG/1
10 CAPSULE ORAL DAILY
Qty: 30 CAPSULE | Refills: 0 | Status: SHIPPED | OUTPATIENT
Start: 2021-08-10 | End: 2021-09-13 | Stop reason: SDUPTHER

## 2021-08-10 RX ORDER — HYDROXYZINE HYDROCHLORIDE 25 MG/1
25 TABLET, FILM COATED ORAL NIGHTLY PRN
Qty: 30 TABLET | Refills: 0 | Status: SHIPPED | OUTPATIENT
Start: 2021-08-10 | End: 2021-09-13 | Stop reason: SDUPTHER

## 2021-08-10 NOTE — PROGRESS NOTES
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 medications) should be stored in a safe and secured location that is not obtainable by children/adolescents.  Patient was given an opportunity and encouraged to ask questions about their medication, illness, and treatment. Patient contracted verbally for the following: If you are experiencing an emotional crisis or have thoughts of harming yourself or others, please go to your nearest local emergency room or call 911. Will continue to re-assess medication response and side effects frequently to establish efficacy and ensure safety. Risks, any black box warnings, side effects, off label usage, and benefits of medication and treatment discussed with patient, along with potential adverse side effects of current and/or newly prescribed medication, alternative treatment options, and OTC medications.  Patient verbalized understanding of potential risks, any off label use of medication, any black box warnings, and any side effects in their own words. The patient verbalized understanding and agreed to comply with the safety plan discussed in their own words.  Patient given the number to the office. Number also available to the 24- hour suicide hotline.          MEDS ORDERED DURING VISIT:  New Medications Ordered This Visit   Medications   • hydrOXYzine (ATARAX) 25 MG tablet     Sig: Take 1 tablet by mouth At  Night As Needed (anxiety and/or sleep).     Dispense:  30 tablet     Refill:  0     Needs appointment for future refill.   • FLUoxetine (PROzac) 10 MG capsule     Sig: Take 1 capsule by mouth Daily.     Dispense:  30 capsule     Refill:  0       Return in about 4 weeks (around 9/7/2021), or if symptoms worsen or fail to improve, for Next scheduled follow up and Recheck.           Functional Status: Moderate impairment     Prognosis: Fair with Ongoing Treatment             This document has been electronically signed by ARVIN Tamez  August 10, 2021 08:58 EDT      Please note that portions of this note were completed with a voice recognition program. Efforts were made to edit dictation, but occasionally words are mistranscribed.

## 2021-08-13 RX ORDER — FLUTICASONE PROPIONATE 50 MCG
SPRAY, SUSPENSION (ML) NASAL
Qty: 16 G | Refills: 3 | Status: SHIPPED | OUTPATIENT
Start: 2021-08-13 | End: 2022-02-14 | Stop reason: SDUPTHER

## 2021-08-13 RX ORDER — HYDROXYZINE HYDROCHLORIDE 25 MG/1
TABLET, FILM COATED ORAL
Qty: 90 TABLET | Refills: 0 | OUTPATIENT
Start: 2021-08-13

## 2021-09-13 DIAGNOSIS — F33.1 MAJOR DEPRESSIVE DISORDER, RECURRENT EPISODE, MODERATE (HCC): Chronic | ICD-10-CM

## 2021-09-13 DIAGNOSIS — F41.9 ANXIETY DISORDER, UNSPECIFIED TYPE: Chronic | ICD-10-CM

## 2021-09-13 RX ORDER — HYDROXYZINE HYDROCHLORIDE 25 MG/1
25 TABLET, FILM COATED ORAL NIGHTLY PRN
Qty: 30 TABLET | Refills: 0 | Status: SHIPPED | OUTPATIENT
Start: 2021-09-13 | End: 2021-10-03 | Stop reason: SDUPTHER

## 2021-09-13 RX ORDER — FLUOXETINE 10 MG/1
10 CAPSULE ORAL DAILY
Qty: 30 CAPSULE | Refills: 0 | Status: SHIPPED | OUTPATIENT
Start: 2021-09-13 | End: 2021-09-27 | Stop reason: SDUPTHER

## 2021-09-16 ENCOUNTER — CLINICAL SUPPORT (OUTPATIENT)
Dept: INTERNAL MEDICINE | Facility: CLINIC | Age: 26
End: 2021-09-16

## 2021-09-16 DIAGNOSIS — E53.8 B12 DEFICIENCY: Primary | ICD-10-CM

## 2021-09-16 PROCEDURE — 96372 THER/PROPH/DIAG INJ SC/IM: CPT | Performed by: NURSE PRACTITIONER

## 2021-09-16 RX ORDER — CYANOCOBALAMIN 1000 UG/ML
1000 INJECTION, SOLUTION INTRAMUSCULAR; SUBCUTANEOUS
Status: DISCONTINUED | OUTPATIENT
Start: 2021-09-16 | End: 2022-03-04

## 2021-09-16 RX ADMIN — CYANOCOBALAMIN 1000 MCG: 1000 INJECTION, SOLUTION INTRAMUSCULAR; SUBCUTANEOUS at 13:24

## 2021-09-27 ENCOUNTER — TELEMEDICINE (OUTPATIENT)
Dept: PSYCHIATRY | Facility: CLINIC | Age: 26
End: 2021-09-27

## 2021-09-27 DIAGNOSIS — F33.1 MAJOR DEPRESSIVE DISORDER, RECURRENT EPISODE, MODERATE (HCC): Primary | Chronic | ICD-10-CM

## 2021-09-27 DIAGNOSIS — F41.9 ANXIETY DISORDER, UNSPECIFIED TYPE: Chronic | ICD-10-CM

## 2021-09-27 PROCEDURE — 99214 OFFICE O/P EST MOD 30 MIN: CPT | Performed by: NURSE PRACTITIONER

## 2021-09-27 RX ORDER — FLUOXETINE HYDROCHLORIDE 20 MG/1
20 CAPSULE ORAL DAILY
Qty: 30 CAPSULE | Refills: 0 | Status: SHIPPED | OUTPATIENT
Start: 2021-09-27 | End: 2021-10-03 | Stop reason: SDUPTHER

## 2021-09-27 NOTE — PROGRESS NOTES
"This provider is located at the [facility] (through [facility]), [address], [place], [address] using a secure [employer] Video Visit through [employer]. Patient is being seen remotely via telehealth at their home address in [place], and stated they are in a secure environment for this session. The patient's condition being diagnosed/treated is appropriate for telemedicine. The provider identified herself as well as her credentials.   The patient, and/or patients guardian, consent to be seen remotely, and when consent is given they understand that the consent allows for patient identifiable information to be sent to a third party as needed.   They may refuse to be seen remotely at any time. The electronic data is encrypted and password protected, and the patient and/or guardian has been advised of the potential risks to privacy not withstanding such measures.    You have chosen to receive care through a telehealth visit.  Do you consent to use a video/audio connection for your medical care today? Yes        Subjective   Maddy Chisholm is a 26 y.o. female who presents today for follow up    Chief Complaint:  Depressive and anxious symptoms, and patient concern for \"borderline\"    Accompanied by: The patient is alone at today's encounter    History of Present Illness:   The patient describes her mood as sad over the last few weeks.  The patient states there are still a lot of things going on situationally that are causing her to be sad.  She states her relationship is still struggling, and things are not going good at work either.  The patient states she is still wondering if she has borderline personality disorder as well.  The patient states she has not been able to get in to see her previous therapist, as she has not been able to reach out to her as of yet, but plans to soon after discussing with this APRN.  This APRN has discussed with the patient since her therapist is out " "of state, if she needs assistance with establishing with a therapist in [place] if her previous therapist is not licensed [place], to reach out to this APRN and we will assist her with this process.  The patient states she has also been doing research and feels she could have borderline personality disorder, and is hoping to come to a definitive diagnosis soon.  She reports she done research on narcissism and felt she could be narcissistic, but later after further research does not feel that is the case because she has read that people with borderline personality can have some narcissistic tendencies.  The patient rates her depression at a 8/10 on a 0-10 scale, with 10 being the worst.  The patient reports her symptoms of depression as being sadness and feeling down.  The patient rates her anxiety at a 7-8/10 on a 0-10 scale, with 10 being the worst.  The patient reports her symptoms of anxiety as being excessive worry.  She states she doesn't want to get angry anymore, and she worries about this.  The patient reports her appetite as \"eating normal to over eating\".  The patient reports her sleep as fair.  She states she feels like she doesn't sleep at the right time in the day/night.  The patient states she averages 7-10 hours of sleep each night.  The patient reports nightmares also. The patient denies any new medical problems or changes in medications since last appointment with this facility.  The patient reports compliance with current medication regimen.  The patient denies any current side effects from her current medication regimen.  The patient denies any abnormal muscle movements or tics.   The patient would like to increase her medications at this visit.  The patient denies any suicidal or homicidal ideations, plans, or intent at today's encounter and is convincing.  The patient denies any auditory hallucinations or visual hallucinations.  The patient does not endorse any significant symptoms consistent " with oskar or psychosis at today's encounter.        Prior Psychiatric Medications:  Lexapro - decreased her libido so she stopped taking it (can't remember if it helped her mood or not)  Hydroxyzine 25 mg by mouth once daily at bedtime  Prozac      Last Menstrual Period:  3-4 weeks ago, states she is due.  The patient was educated that her prescribed medications can have potential risk to a developing fetus. The patient is advised to contact this APRN/this office if she becomes pregnant or plans to become pregnant.  Pt verbalizes understanding and acknowledged agreement with this plan in her own words.        The following portions of the patient's history were reviewed and updated as appropriate: allergies, current medications, past family history, past medical history, past social history, past surgical history and problem list.            Past Medical History:  Past Medical History:   Diagnosis Date   • Anemia    • Anxiety    • Depression    • Vitamin B12 deficiency        Social History:  Social History     Socioeconomic History   • Marital status: Single     Spouse name: Not on file   • Number of children: 0   • Years of education: Not on file   • Highest education level: Not on file   Tobacco Use   • Smoking status: Former Smoker     Packs/day: 0.20     Years: 0.25     Pack years: 0.05     Types: Cigarettes     Start date: 2020     Quit date: 8/15/2020     Years since quittin.1   • Smokeless tobacco: Never Used   • Tobacco comment: smoked for 5 years  (black and milds for 5 years then restarted 2020 and quit 2020   Vaping Use   • Vaping Use: Never used   Substance and Sexual Activity   • Alcohol use: Yes     Comment: Only occasionally, states not frequently   • Drug use: Not Currently     Comment: The patient states in her past she has used marijuanna, but not currently   • Sexual activity: Yes     Partners: Male     Birth control/protection: OCP       Family History:  Family History   Adopted:  Yes       Past Surgical History:  Past Surgical History:   Procedure Laterality Date   • NO PAST SURGERIES         Problem List:  Patient Active Problem List   Diagnosis   • Iron deficiency anemia secondary to inadequate dietary iron intake   • B12 deficiency   • Anxiety   • BV (bacterial vaginosis)       Allergy:   No Known Allergies     Current Medications:   Current Outpatient Medications   Medication Sig Dispense Refill   • drospirenone-ethinyl estradiol (TICO,OCELLA) 3-0.03 MG per tablet Take 1 tablet by mouth Daily. 28 tablet 10   • ferrous gluconate (FERGON) 324 MG tablet Take 1 tablet by mouth 3 (Three) Times a Day With Meals. 90 tablet 0   • FLUoxetine (PROzac) 20 MG capsule Take 1 capsule by mouth Daily. 30 capsule 0   • fluticasone (FLONASE) 50 MCG/ACT nasal spray SPRAY TWO SPRAYS IN EACH NOSTRIL ONCE DAILY AS DIRECTED 16 g 3   • hydrOXYzine (ATARAX) 25 MG tablet Take 1 tablet by mouth At Night As Needed (anxiety and/or sleep). 30 tablet 0     Current Facility-Administered Medications   Medication Dose Route Frequency Provider Last Rate Last Admin   • cyanocobalamin injection 1,000 mcg  1,000 mcg Intramuscular Q28 Days Vero Lopez, ARVIN   1,000 mcg at 09/16/21 1324       Review of Symptoms:    Review of Systems   Constitutional: Positive for activity change, appetite change and fatigue. Negative for chills, fever, unexpected weight gain and unexpected weight loss.   HENT: Negative.    Eyes: Negative.    Respiratory: Negative.    Cardiovascular: Negative.    Gastrointestinal: Negative.    Endocrine: Negative.    Genitourinary: Negative.    Musculoskeletal: Negative.    Skin: Negative.    Neurological: Negative.    Psychiatric/Behavioral: Positive for agitation, behavioral problems, decreased concentration, sleep disturbance, depressed mood and stress. Negative for dysphoric mood, hallucinations, self-injury, suicidal ideas and negative for hyperactivity. The patient is nervous/anxious.           Physical Exam:   not currently breastfeeding. There is no height or weight on file to calculate BMI.   Due to the remote nature of this encounter (virtual encounter), vitals were unable to be obtained.  Height stated at 66.5 inches.  Weight stated at 160 pounds.      Physical Exam  Constitutional:       Appearance: Normal appearance.   Neurological:      Mental Status: She is alert and oriented to person, place, and time.   Psychiatric:         Attention and Perception: Attention normal.         Mood and Affect: Mood and affect normal.         Speech: Speech normal.         Behavior: Behavior normal. Behavior is cooperative.         Thought Content: Thought content normal. Thought content does not include homicidal or suicidal ideation. Thought content does not include homicidal or suicidal plan.         Cognition and Memory: Cognition and memory normal.         Judgment: Judgment normal.           Mental Status Exam:   Hygiene:   good  Cooperation:  Cooperative  Eye Contact:  Good  Psychomotor Behavior:  Appropriate  Affect:  Appropriate  Mood: normal  Hopelessness: Denies  Speech:  Normal  Thought Process:  Linear  Thought Content:  Mood congruent  Suicidal:  None  Homicidal:  None  Hallucinations:  None  Delusion:  None  Memory:  Intact  Orientation:  Person, Place, Time and Situation  Reliability:  good  Insight:  Good  Judgement:  Impaired  Impulse Control:  Impaired  Physical/Medical Issues:  No        PHQ-9 Depression Screening  Little interest or pleasure in doing things? 2   Feeling down, depressed, or hopeless? 2   Trouble falling or staying asleep, or sleeping too much? 2   Feeling tired or having little energy? 2   Poor appetite or overeating? 3   Feeling bad about yourself - or that you are a failure or have let yourself or your family down? 3   Trouble concentrating on things, such as reading the newspaper or watching television? 2   Moving or speaking so slowly that other people could have  noticed? Or the opposite - being so fidgety or restless that you have been moving around a lot more than usual? 2   Thoughts that you would be better off dead, or of hurting yourself in some way? 2   PHQ-9 Total Score 20   If you checked off any problems, how difficult have these problems made it for you to do your work, take care of things at home, or get along with other people? Very difficult     PHQ-9 Total Score: 20      Feeling nervous, anxious or on edge: (P) More than half the days  Not being able to stop or control worrying: (P) Nearly every day  Worrying too much about different things: (P) Nearly every day  Trouble Relaxing: (P) More than half the days  Being so restless that it is hard to sit still: (P) Several days  Feeling afraid as if something awful might happen: (P) More than half the days  Becoming easily annoyed or irritable: (P) Nearly every day  JAIRO 7 Total Score: (P) 16  If you checked any problems, how difficult have these problems made it for you to do your work, take care of things at home, or get along with other people: (P) Very difficult      PROMIS scale screening tool that patient filled out virtually reviewed by this APRN at today's encounter.        Lab Results:   No visits with results within 1 Month(s) from this visit.   Latest known visit with results is:   Lab on 02/17/2021   Component Date Value Ref Range Status   • WBC 02/17/2021 5.07  3.40 - 10.80 10*3/mm3 Final   • RBC 02/17/2021 4.80  3.77 - 5.28 10*6/mm3 Final   • Hemoglobin 02/17/2021 12.5  12.0 - 15.9 g/dL Final   • Hematocrit 02/17/2021 37.4  34.0 - 46.6 % Final   • MCV 02/17/2021 77.9* 79.0 - 97.0 fL Final   • MCH 02/17/2021 26.0* 26.6 - 33.0 pg Final   • MCHC 02/17/2021 33.4  31.5 - 35.7 g/dL Final   • RDW 02/17/2021 13.5  12.3 - 15.4 % Final   • RDW-SD 02/17/2021 37.6  37.0 - 54.0 fl Final   • MPV 02/17/2021 10.9  6.0 - 12.0 fL Final   • Platelets 02/17/2021 215  140 - 450 10*3/mm3 Final   • Glucose 02/17/2021 83  65 -  99 mg/dL Final   • BUN 02/17/2021 12  6 - 20 mg/dL Final   • Creatinine 02/17/2021 0.62  0.57 - 1.00 mg/dL Final   • Sodium 02/17/2021 139  136 - 145 mmol/L Final   • Potassium 02/17/2021 3.8  3.5 - 5.2 mmol/L Final   • Chloride 02/17/2021 105  98 - 107 mmol/L Final   • CO2 02/17/2021 23.5  22.0 - 29.0 mmol/L Final   • Calcium 02/17/2021 10.0  8.6 - 10.5 mg/dL Final   • Total Protein 02/17/2021 6.9  6.0 - 8.5 g/dL Final   • Albumin 02/17/2021 4.20  3.50 - 5.20 g/dL Final   • ALT (SGPT) 02/17/2021 9  1 - 33 U/L Final   • AST (SGOT) 02/17/2021 24  1 - 32 U/L Final   • Alkaline Phosphatase 02/17/2021 47  39 - 117 U/L Final   • Total Bilirubin 02/17/2021 <0.2  0.0 - 1.2 mg/dL Final   • eGFR   Amer 02/17/2021 141  >60 mL/min/1.73 Final   • Globulin 02/17/2021 2.7  gm/dL Final   • A/G Ratio 02/17/2021 1.6  g/dL Final   • BUN/Creatinine Ratio 02/17/2021 19.4  7.0 - 25.0 Final   • Anion Gap 02/17/2021 10.5  5.0 - 15.0 mmol/L Final   • Vitamin B-12 02/17/2021 481  211 - 946 pg/mL Final   • Ferritin 02/17/2021 36.70  13.00 - 150.00 ng/mL Final   • Hepatitis B Surface Ag 02/17/2021 Non-Reactive  Non-Reactive Final   • Hep A IgM 02/17/2021 Non-Reactive  Non-Reactive Final   • Hep B C IgM 02/17/2021 Non-Reactive  Non-Reactive Final   • Hepatitis C Ab 02/17/2021 Non-Reactive  Non-Reactive Final   • HIV-1/ HIV-2 02/17/2021 Non-Reactive  Non-Reactive Final    A non-reactive test result does not preclude the possibility of exposure to HIV or infection with HIV. An antibody response to recent exposure may take several months to reach detectable levels.   • HSV 1 IgM 02/17/2021 <1:10  <1:10 titer Final   • HSV 2 IgM 02/17/2021 <1:10  <1:10 titer Final    HSV 1 and HSV 2 share many cross-reacting antigens. Elevated titers  to both HSV 1 and HSV 2 may represent crossreactive HSV antibodies  rather than exposure to both HSV 1 and HSV 2.  Results for this test are for research purposes only by the assay's  . The  performance characteristics of this product have  not been established.  Results should not be used as a diagnostic  procedure without confirmation of the diagnosis by another medically  established diagnostic product or procedure.   • HSV 1 IgG, Type Specific 02/17/2021 <0.91  0.00 - 0.90 index Final                                     Negative        <0.91                                   Equivocal 0.91 - 1.09                                   Positive        >1.09   Note: Negative indicates no antibodies detected to   HSV-1. Equivocal may suggest early infection.  If   clinically appropriate, retest at later date. Positive   indicates antibodies detected to HSV-1.   • HSV 2 IgG 02/17/2021 <0.91  0.00 - 0.90 index Final                                     Negative        <0.91                                   Equivocal 0.91 - 1.09                                   Positive        >1.09   Note: Negative indicates no antibodies detected to   HSV-2. Equivocal may suggest early infection.  If   clinically appropriate, retest at later date. Positive   indicates antibodies detected to HSV-2.   • RPR 02/17/2021 Non-Reactive  Non-Reactive Final         Assessment/Plan   Problems Addressed this Visit     None      Visit Diagnoses     Major depressive disorder, recurrent episode, moderate (HCC)  (Chronic)   -  Primary    R/O BPD    Relevant Medications    FLUoxetine (PROzac) 20 MG capsule    Anxiety disorder, unspecified type  (Chronic)       Relevant Medications    FLUoxetine (PROzac) 20 MG capsule      Diagnoses       Codes Comments    Major depressive disorder, recurrent episode, moderate (HCC)    -  Primary ICD-10-CM: F33.1  ICD-9-CM: 296.32 R/O BPD    Anxiety disorder, unspecified type     ICD-10-CM: F41.9  ICD-9-CM: 300.00           Visit Diagnoses:    ICD-10-CM ICD-9-CM   1. Major depressive disorder, recurrent episode, moderate (HCC)  F33.1 296.32   2. Anxiety disorder, unspecified type  F41.9 300.00           GOALS:  Short Term Goals: Patient will be compliant with medication, and patient will have no significant medication related side effects.  Patient will be engaged in psychotherapy as indicated.  Patient will report subjective improvement of symptoms.  Long term goals: To stabilize mood and treat/improve subjective symptoms, the patient will stay out of the hospital, the patient will be at an optimal level of functioning, and the patient will take all medications as prescribed.  The patient verbalized understanding and agreement with goals that were mutually set.      TREATMENT PLAN: Continue supportive psychotherapy efforts and medications as indicated.  Medication and treatment options, both pharmacological and non-pharmacological treatment options, discussed during today's visit, including any off label use of medication. Patient acknowledged and verbally consented with current treatment plan and was educated on the importance of compliance with treatment and follow-up appointments.      - Increase Prozac to 20 mg by mouth once daily for mood.  -The patient reports her previous therapist is out of state, and she plans to reach out to her soon after discussion with his APRN.  This APRN has discussed with the patient if her therapist is not licensed in [place] and she needs assistance with setting up an appointment with a new therapist to reach out to this APRN immediately and we will assist her with this process.  This APRN has recommended and discussed the importance of being in psychotherapy with the patient.  The patient verbalizes understanding in her own words.    Patient screened positive for depression based on a PHQ-9 score of 20 on 9/27/2021. Follow-up recommendations include: Prescribed antidepressant medication treatment, Suicide Risk Assessment performed and continue psychotherapy.      MEDICATION ISSUES:  Discussed medication options and treatment plan of prescribed medication, any off label use  of medication, as well as the risks, benefits, any black box warnings including increased suicidality, and side effects including but not limited to potential falls, dizziness, possible impaired driving, GI side effects (change in appetite, abdominal discomfort, nausea, vomiting, diarrhea, and/or constipation), dry mouth, somnolence, sedation, insomnia, activation, agitation, irritation, tremors, abnormal muscle movements or disorders, headache, sweating, possible bruising or rare bleeding, electrolyte and/or fluid abnormalities, change in blood pressure/heart rate/and or heart rhythm, sexual dysfunction, and metabolic adversities among others. Patient and/or guardian agreeable to call the office with any worsening of symptoms or onset of side effects, or if any concerns or questions arise.  The contact information for the office is made available to the patient and/or guardian.  Patient and/or guardian agreeable to call 911 or go to the nearest ER should they begin having any SI/HI, or if any urgent concerns arise. No medication side effects or related complaints today.      SUICIDE RISK ASSESSMENT: Unalterable demographics and a history of mental health intervention indicate this patient is in a high risk category compared to the general population. At present, the patient denies active SI/HI, intentions, or plans at this time and agrees to seek immediate care should such thoughts develop. The patient verbalizes understanding of how to access emergency care if needed and agrees to do so. Consideration of suicide risk and protective factors such as history, current presentation, individual strengths and weaknesses, psychosocial and environmental stressors and variables, psychiatric illness and symptoms, medical conditions and pain, took place in this interview. Based on those considerations, the patient is determined: within individual baseline and presenting no imminent risk for suicide or homicide. Other  recommendations: The patient does not meet the criteria for inpatient admission and is not a safety risk to self or others at today's visit. Inpatient treatment offers no significant advantages over outpatient treatment for this patient at today's visit.      SAFETY PLAN:  Patient was given ample time for questions and fully participated in treatment planning.  Patient was encouraged to call the clinic with any questions or concerns.  Patient was informed of access to emergency care. If patient were to develop any significant symptomatology, suicidal ideation, homicidal ideation, any concerns, or feel unsafe at any time they are to call the clinic and if unable to get immediate assistance should immediately call 911 or go to the nearest emergency room.  The patient is advised to remove or secure (lock away) all lethal weapons (including guns) and sharps (including razors, scissors, knives, etc.).  All medications (including any prescribed and any over the counter medications) should be stored in a safe and secured location that is not obtainable by children/adolescents.  Patient was given an opportunity and encouraged to ask questions about their medication, illness, and treatment. Patient contracted verbally for the following: If you are experiencing an emotional crisis or have thoughts of harming yourself or others, please go to your nearest local emergency room or call 911. Will continue to re-assess medication response and side effects frequently to establish efficacy and ensure safety. Risks, any black box warnings, side effects, off label usage, and benefits of medication and treatment discussed with patient, along with potential adverse side effects of current and/or newly prescribed medication, alternative treatment options, and OTC medications.  Patient verbalized understanding of potential risks, any off label use of medication, any black box warnings, and any side effects in their own words. The patient  verbalized understanding and agreed to comply with the safety plan discussed in their own words.  Patient given the number to the office. Number also available to the 24- hour suicide hotline.          MEDS ORDERED DURING VISIT:  New Medications Ordered This Visit   Medications   • FLUoxetine (PROzac) 20 MG capsule     Sig: Take 1 capsule by mouth Daily.     Dispense:  30 capsule     Refill:  0       Return in about 4 weeks (around 10/25/2021), or if symptoms worsen or fail to improve, for Next scheduled follow up and Recheck.           Functional Status: Moderate impairment     Prognosis: Fair with Ongoing Treatment             This document has been electronically signed by ARVIN Tamez  September 27, 2021 08:35 EDT    Some of the data in this electronic note has been brought forward from a previous encounter, any necessary changes have been made, it has been reviewed by this APRN, and it is accurate.    Please note that portions of this note were completed with a voice recognition program. Efforts were made to edit dictation, but occasionally words are mistranscribed.

## 2021-10-03 DIAGNOSIS — F41.9 ANXIETY DISORDER, UNSPECIFIED TYPE: Chronic | ICD-10-CM

## 2021-10-03 DIAGNOSIS — F33.1 MAJOR DEPRESSIVE DISORDER, RECURRENT EPISODE, MODERATE (HCC): Chronic | ICD-10-CM

## 2021-10-04 RX ORDER — HYDROXYZINE HYDROCHLORIDE 25 MG/1
25 TABLET, FILM COATED ORAL NIGHTLY PRN
Qty: 30 TABLET | Refills: 0 | Status: SHIPPED | OUTPATIENT
Start: 2021-10-04 | End: 2021-10-18 | Stop reason: SDUPTHER

## 2021-10-04 RX ORDER — FLUOXETINE HYDROCHLORIDE 20 MG/1
20 CAPSULE ORAL DAILY
Qty: 30 CAPSULE | Refills: 0 | Status: SHIPPED | OUTPATIENT
Start: 2021-10-04 | End: 2021-10-20 | Stop reason: SDUPTHER

## 2021-10-18 DIAGNOSIS — F41.9 ANXIETY DISORDER, UNSPECIFIED TYPE: Chronic | ICD-10-CM

## 2021-10-19 RX ORDER — HYDROXYZINE HYDROCHLORIDE 25 MG/1
25 TABLET, FILM COATED ORAL NIGHTLY PRN
Qty: 30 TABLET | Refills: 0 | Status: SHIPPED | OUTPATIENT
Start: 2021-10-19 | End: 2021-10-20 | Stop reason: SDUPTHER

## 2021-10-20 ENCOUNTER — TELEMEDICINE (OUTPATIENT)
Dept: PSYCHIATRY | Facility: CLINIC | Age: 26
End: 2021-10-20

## 2021-10-20 DIAGNOSIS — F41.9 ANXIETY DISORDER, UNSPECIFIED TYPE: Chronic | ICD-10-CM

## 2021-10-20 DIAGNOSIS — F33.1 MAJOR DEPRESSIVE DISORDER, RECURRENT EPISODE, MODERATE (HCC): Primary | Chronic | ICD-10-CM

## 2021-10-20 PROCEDURE — 99214 OFFICE O/P EST MOD 30 MIN: CPT | Performed by: NURSE PRACTITIONER

## 2021-10-20 RX ORDER — HYDROXYZINE HYDROCHLORIDE 25 MG/1
25 TABLET, FILM COATED ORAL NIGHTLY PRN
Qty: 30 TABLET | Refills: 0 | Status: SHIPPED | OUTPATIENT
Start: 2021-10-20 | End: 2021-11-17 | Stop reason: SDUPTHER

## 2021-10-20 RX ORDER — FLUOXETINE HYDROCHLORIDE 40 MG/1
40 CAPSULE ORAL DAILY
Qty: 30 CAPSULE | Refills: 0 | Status: SHIPPED | OUTPATIENT
Start: 2021-10-20 | End: 2021-11-17 | Stop reason: SDUPTHER

## 2021-10-20 NOTE — PROGRESS NOTES
This provider is located at the [facility] (through [facility]), [address], [place], [address] using a secure MyChart Video Visit through [employer]. Patient is being seen remotely via telehealth at their home address in [place], and stated they are in a secure environment for this session. The patient's condition being diagnosed/treated is appropriate for telemedicine. The provider identified herself as well as her credentials.   The patient, and/or patients guardian, consent to be seen remotely, and when consent is given they understand that the consent allows for patient identifiable information to be sent to a third party as needed.   They may refuse to be seen remotely at any time. The electronic data is encrypted and password protected, and the patient and/or guardian has been advised of the potential risks to privacy not withstanding such measures.    You have chosen to receive care through a telehealth visit.  Do you consent to use a video/audio connection for your medical care today? Yes        Subjective   Maddy Chisholm is a 26 y.o. female who presents today for follow up    Chief Complaint:  Depressive and anxious symptoms, and patient concern for borderline personality disorder    Accompanied by: The patient is alone at today's encounter    History of Present Illness:   The patient describes her mood as a little better over the last few weeks.  The patient reports she reached out to her old therapist who has informed her that she is not licensed in [place], and the patient is agreeable and would like to start psychotherapy through the behavioral health virtual clinic.  This APRN did administer and review the [place] screening instrument for borderline personality disorder with the patient at today's encounter, the patient answered yes to all questions and scored a 10 out of 10 which is highly suggestive of borderline personality disorder.  The patient states  she has also done her own research and feels she may have borderline personality disorder.  The patient reports her appetite as good.  The patient reports her sleep as improved.  The patient states she sleeps about 6-7 hours each day.  The patient states she does feel fatigued in the day sometimes.  The patient states she still has some bad dreams, but they aren't as bad.  The patient denies any new medical problems or changes in medications since last appointment with this facility.  The patient reports compliance with current medication regimen.  The patient denies any current side effects from her current medication regimen.  The patient denies any abnormal muscle movements or tics.  The patient rates her depression at a 7/10 on a 0-10 scale, with 10 being the worst.  The patient rates her anxiety at a 7/10 on a 0-10 scale, with 10 being the worst.  The patient rates hopelessness at a 0/10 on a 0-10 scale with 10 being the worst.  The patient would like to increase her medications at this visit to help with her continued worsened moods.  The patient states she does feel a little better, but she isn't where she wants to be yet in her moods.  The patient denies any suicidal or homicidal ideations, plans, or intent at today's encounter and is convincing.  The patient denies any auditory hallucinations or visual hallucinations.  The patient does not endorse any significant symptoms consistent with oskar or psychosis at today's encounter.        Prior Psychiatric Medications:  Lexapro - decreased her libido so she stopped taking it (can't remember if it helped her mood or not)  Hydroxyzine 25 mg by mouth once daily at bedtime  Prozac      Last Menstrual Period:  2 weeks ago.  The patient was educated that her prescribed medications can have potential risk to a developing fetus. The patient is advised to contact this APRN/this office if she becomes pregnant or plans to become pregnant.  Pt verbalizes understanding and  acknowledged agreement with this plan in her own words.        The following portions of the patient's history were reviewed and updated as appropriate: allergies, current medications, past family history, past medical history, past social history, past surgical history and problem list.            Past Medical History:  Past Medical History:   Diagnosis Date   • Anemia    • Anxiety    • Depression    • Vitamin B12 deficiency        Social History:  Social History     Socioeconomic History   • Marital status: Single   • Number of children: 0   Tobacco Use   • Smoking status: Former Smoker     Packs/day: 0.20     Years: 0.25     Pack years: 0.05     Types: Cigarettes     Start date: 2020     Quit date: 8/15/2020     Years since quittin.1   • Smokeless tobacco: Never Used   • Tobacco comment: smoked for 5 years  (black and milds for 5 years then restarted 2020 and quit 2020   Vaping Use   • Vaping Use: Never used   Substance and Sexual Activity   • Alcohol use: Yes     Comment: Only occasionally, states not frequently   • Drug use: Not Currently     Comment: The patient states in her past she has used marijuanna, but not currently   • Sexual activity: Yes     Partners: Male     Birth control/protection: OCP       Family History:  Family History   Adopted: Yes       Past Surgical History:  Past Surgical History:   Procedure Laterality Date   • NO PAST SURGERIES         Problem List:  Patient Active Problem List   Diagnosis   • Iron deficiency anemia secondary to inadequate dietary iron intake   • B12 deficiency   • Anxiety   • BV (bacterial vaginosis)       Allergy:   No Known Allergies     Current Medications:   Current Outpatient Medications   Medication Sig Dispense Refill   • drospirenone-ethinyl estradiol (TICO,OCELLA) 3-0.03 MG per tablet Take 1 tablet by mouth Daily. 28 tablet 10   • ferrous gluconate (FERGON) 324 MG tablet Take 1 tablet by mouth 3 (Three) Times a Day With Meals. 90 tablet 0   •  FLUoxetine (PROzac) 40 MG capsule Take 1 capsule by mouth Daily. 30 capsule 0   • fluticasone (FLONASE) 50 MCG/ACT nasal spray SPRAY TWO SPRAYS IN EACH NOSTRIL ONCE DAILY AS DIRECTED 16 g 3   • hydrOXYzine (ATARAX) 25 MG tablet Take 1 tablet by mouth At Night As Needed (anxiety and/or sleep). 30 tablet 0     Current Facility-Administered Medications   Medication Dose Route Frequency Provider Last Rate Last Admin   • cyanocobalamin injection 1,000 mcg  1,000 mcg Intramuscular Q28 Days Vero Lopez APRN   1,000 mcg at 09/16/21 1324       Review of Symptoms:    Review of Systems   Constitutional: Positive for activity change, appetite change and fatigue. Negative for chills, fever, unexpected weight gain and unexpected weight loss.   HENT: Negative.    Eyes: Negative.    Respiratory: Negative.    Cardiovascular: Negative.    Gastrointestinal: Negative.    Endocrine: Negative.    Genitourinary: Negative.    Musculoskeletal: Negative.    Skin: Negative.    Neurological: Negative.    Psychiatric/Behavioral: Positive for agitation, behavioral problems, decreased concentration, sleep disturbance, depressed mood and stress. Negative for dysphoric mood, hallucinations, self-injury, suicidal ideas and negative for hyperactivity. The patient is nervous/anxious.          Physical Exam:   not currently breastfeeding. There is no height or weight on file to calculate BMI.   Due to the remote nature of this encounter (virtual encounter), vitals were unable to be obtained.  Height stated at 66.5 inches.  Weight stated at 160's pounds.      Physical Exam  Constitutional:       Appearance: Normal appearance.   Neurological:      Mental Status: She is alert and oriented to person, place, and time.   Psychiatric:         Attention and Perception: Attention normal.         Mood and Affect: Mood and affect normal.         Speech: Speech normal.         Behavior: Behavior normal. Behavior is cooperative.         Thought Content:  Thought content normal. Thought content does not include homicidal or suicidal ideation. Thought content does not include homicidal or suicidal plan.         Cognition and Memory: Cognition and memory normal.         Judgment: Judgment normal.           Mental Status Exam:   Hygiene:   good  Cooperation:  Cooperative  Eye Contact:  Good  Psychomotor Behavior:  Appropriate  Affect:  Appropriate  Mood: normal  Hopelessness: Denies  Speech:  Normal  Thought Process:  Linear  Thought Content:  Mood congruent  Suicidal:  None  Homicidal:  None  Hallucinations:  None  Delusion:  None  Memory:  Intact  Orientation:  Person, Place, Time and Situation  Reliability:  good  Insight:  Good  Judgement:  Good  Impulse Control:  Good  Physical/Medical Issues:  No        PHQ-9 Depression Screening  Little interest or pleasure in doing things? (P) 1   Feeling down, depressed, or hopeless? (P) 3   Trouble falling or staying asleep, or sleeping too much? (P) 1   Feeling tired or having little energy? (P) 1   Poor appetite or overeating? (P) 3   Feeling bad about yourself - or that you are a failure or have let yourself or your family down? (P) 3   Trouble concentrating on things, such as reading the newspaper or watching television? (P) 1   Moving or speaking so slowly that other people could have noticed? Or the opposite - being so fidgety or restless that you have been moving around a lot more than usual? (P) 1   Thoughts that you would be better off dead, or of hurting yourself in some way? (P) 3   PHQ-9 Total Score (P) 17   If you checked off any problems, how difficult have these problems made it for you to do your work, take care of things at home, or get along with other people? (P) Extremely dIfficult     PHQ-9 Total Score: (P) 17      Feeling nervous, anxious or on edge: (P) Several days  Not being able to stop or control worrying: (P) More than half the days  Worrying too much about different things: (P) Nearly every  day  Trouble Relaxing: (P) Several days  Being so restless that it is hard to sit still: (P) Several days  Feeling afraid as if something awful might happen: (P) Several days  Becoming easily annoyed or irritable: (P) Nearly every day  JAIRO 7 Total Score: (P) 12  If you checked any problems, how difficult have these problems made it for you to do your work, take care of things at home, or get along with other people: (P) Very difficult      PROMIS scale screening tool that patient filled out virtually reviewed by this APRN at today's encounter.        Lab Results:   No visits with results within 1 Month(s) from this visit.   Latest known visit with results is:   Lab on 02/17/2021   Component Date Value Ref Range Status   • WBC 02/17/2021 5.07  3.40 - 10.80 10*3/mm3 Final   • RBC 02/17/2021 4.80  3.77 - 5.28 10*6/mm3 Final   • Hemoglobin 02/17/2021 12.5  12.0 - 15.9 g/dL Final   • Hematocrit 02/17/2021 37.4  34.0 - 46.6 % Final   • MCV 02/17/2021 77.9* 79.0 - 97.0 fL Final   • MCH 02/17/2021 26.0* 26.6 - 33.0 pg Final   • MCHC 02/17/2021 33.4  31.5 - 35.7 g/dL Final   • RDW 02/17/2021 13.5  12.3 - 15.4 % Final   • RDW-SD 02/17/2021 37.6  37.0 - 54.0 fl Final   • MPV 02/17/2021 10.9  6.0 - 12.0 fL Final   • Platelets 02/17/2021 215  140 - 450 10*3/mm3 Final   • Glucose 02/17/2021 83  65 - 99 mg/dL Final   • BUN 02/17/2021 12  6 - 20 mg/dL Final   • Creatinine 02/17/2021 0.62  0.57 - 1.00 mg/dL Final   • Sodium 02/17/2021 139  136 - 145 mmol/L Final   • Potassium 02/17/2021 3.8  3.5 - 5.2 mmol/L Final   • Chloride 02/17/2021 105  98 - 107 mmol/L Final   • CO2 02/17/2021 23.5  22.0 - 29.0 mmol/L Final   • Calcium 02/17/2021 10.0  8.6 - 10.5 mg/dL Final   • Total Protein 02/17/2021 6.9  6.0 - 8.5 g/dL Final   • Albumin 02/17/2021 4.20  3.50 - 5.20 g/dL Final   • ALT (SGPT) 02/17/2021 9  1 - 33 U/L Final   • AST (SGOT) 02/17/2021 24  1 - 32 U/L Final   • Alkaline Phosphatase 02/17/2021 47  39 - 117 U/L Final   • Total  Bilirubin 02/17/2021 <0.2  0.0 - 1.2 mg/dL Final   • eGFR   Amer 02/17/2021 141  >60 mL/min/1.73 Final   • Globulin 02/17/2021 2.7  gm/dL Final   • A/G Ratio 02/17/2021 1.6  g/dL Final   • BUN/Creatinine Ratio 02/17/2021 19.4  7.0 - 25.0 Final   • Anion Gap 02/17/2021 10.5  5.0 - 15.0 mmol/L Final   • Vitamin B-12 02/17/2021 481  211 - 946 pg/mL Final   • Ferritin 02/17/2021 36.70  13.00 - 150.00 ng/mL Final   • Hepatitis B Surface Ag 02/17/2021 Non-Reactive  Non-Reactive Final   • Hep A IgM 02/17/2021 Non-Reactive  Non-Reactive Final   • Hep B C IgM 02/17/2021 Non-Reactive  Non-Reactive Final   • Hepatitis C Ab 02/17/2021 Non-Reactive  Non-Reactive Final   • HIV-1/ HIV-2 02/17/2021 Non-Reactive  Non-Reactive Final    A non-reactive test result does not preclude the possibility of exposure to HIV or infection with HIV. An antibody response to recent exposure may take several months to reach detectable levels.   • HSV 1 IgM 02/17/2021 <1:10  <1:10 titer Final   • HSV 2 IgM 02/17/2021 <1:10  <1:10 titer Final    HSV 1 and HSV 2 share many cross-reacting antigens. Elevated titers  to both HSV 1 and HSV 2 may represent crossreactive HSV antibodies  rather than exposure to both HSV 1 and HSV 2.  Results for this test are for research purposes only by the assay's  . The performance characteristics of this product have  not been established.  Results should not be used as a diagnostic  procedure without confirmation of the diagnosis by another medically  established diagnostic product or procedure.   • HSV 1 IgG, Type Specific 02/17/2021 <0.91  0.00 - 0.90 index Final                                     Negative        <0.91                                   Equivocal 0.91 - 1.09                                   Positive        >1.09   Note: Negative indicates no antibodies detected to   HSV-1. Equivocal may suggest early infection.  If   clinically appropriate, retest at later date. Positive    indicates antibodies detected to HSV-1.   • HSV 2 IgG 02/17/2021 <0.91  0.00 - 0.90 index Final                                     Negative        <0.91                                   Equivocal 0.91 - 1.09                                   Positive        >1.09   Note: Negative indicates no antibodies detected to   HSV-2. Equivocal may suggest early infection.  If   clinically appropriate, retest at later date. Positive   indicates antibodies detected to HSV-2.   • RPR 02/17/2021 Non-Reactive  Non-Reactive Final         Assessment/Plan   Problems Addressed this Visit     None      Visit Diagnoses     Major depressive disorder, recurrent episode, moderate (HCC)  (Chronic)   -  Primary    R/O BPD    Relevant Medications    FLUoxetine (PROzac) 40 MG capsule    hydrOXYzine (ATARAX) 25 MG tablet    Anxiety disorder, unspecified type  (Chronic)       Relevant Medications    FLUoxetine (PROzac) 40 MG capsule    hydrOXYzine (ATARAX) 25 MG tablet      Diagnoses       Codes Comments    Major depressive disorder, recurrent episode, moderate (HCC)    -  Primary ICD-10-CM: F33.1  ICD-9-CM: 296.32 R/O BPD    Anxiety disorder, unspecified type     ICD-10-CM: F41.9  ICD-9-CM: 300.00           Visit Diagnoses:    ICD-10-CM ICD-9-CM   1. Major depressive disorder, recurrent episode, moderate (HCC)  F33.1 296.32   2. Anxiety disorder, unspecified type  F41.9 300.00          GOALS:  Short Term Goals: Patient will be compliant with medication, and patient will have no significant medication related side effects.  Patient will be engaged in psychotherapy as indicated.  Patient will report subjective improvement of symptoms.  Long term goals: To stabilize mood and treat/improve subjective symptoms, the patient will stay out of the hospital, the patient will be at an optimal level of functioning, and the patient will take all medications as prescribed.  The patient verbalized understanding and agreement with goals that were mutually  set.      TREATMENT PLAN: Continue supportive psychotherapy efforts and medications as indicated.  Medication and treatment options, both pharmacological and non-pharmacological treatment options, discussed during today's visit, including any off label use of medication. Patient acknowledged and verbally consented with current treatment plan and was educated on the importance of compliance with treatment and follow-up appointments.      - Increase Prozac to 40 mg by mouth once daily for mood.  -Continue hydroxyzine 25 mg by mouth once daily at bedtime as needed for sleep and/or anxiety.  -The patient reports she reached out to her old therapist who has informed her that she is not licensed in [place].  The patient would like to start psychotherapy through the behavioral health virtual clinic.  A referral has been placed.    Patient screened positive for depression based on a PHQ-9 score of 17 on 10/20/2021. Follow-up recommendations include: Prescribed antidepressant medication treatment, Suicide Risk Assessment performed and start psychotherapy.      MEDICATION ISSUES:  Discussed medication options and treatment plan of prescribed medication, any off label use of medication, as well as the risks, benefits, any black box warnings including increased suicidality, and side effects including but not limited to potential falls, dizziness, possible impaired driving, GI side effects (change in appetite, abdominal discomfort, nausea, vomiting, diarrhea, and/or constipation), dry mouth, somnolence, sedation, insomnia, activation, agitation, irritation, tremors, abnormal muscle movements or disorders, headache, sweating, possible bruising or rare bleeding, electrolyte and/or fluid abnormalities, change in blood pressure/heart rate/and or heart rhythm, sexual dysfunction, and metabolic adversities among others. Patient and/or guardian agreeable to call the office with any worsening of symptoms or onset of side effects, or if  any concerns or questions arise.  The contact information for the office is made available to the patient and/or guardian.  Patient and/or guardian agreeable to call 911 or go to the nearest ER should they begin having any SI/HI, or if any urgent concerns arise. No medication side effects or related complaints today.      SUICIDE RISK ASSESSMENT: Unalterable demographics and a history of mental health intervention indicate this patient is in a high risk category compared to the general population. At present, the patient denies active SI/HI, intentions, or plans at this time and agrees to seek immediate care should such thoughts develop. The patient verbalizes understanding of how to access emergency care if needed and agrees to do so. Consideration of suicide risk and protective factors such as history, current presentation, individual strengths and weaknesses, psychosocial and environmental stressors and variables, psychiatric illness and symptoms, medical conditions and pain, took place in this interview. Based on those considerations, the patient is determined: within individual baseline and presenting no imminent risk for suicide or homicide. Other recommendations: The patient does not meet the criteria for inpatient admission and is not a safety risk to self or others at today's visit. Inpatient treatment offers no significant advantages over outpatient treatment for this patient at today's visit.      SAFETY PLAN:  Patient was given ample time for questions and fully participated in treatment planning.  Patient was encouraged to call the clinic with any questions or concerns.  Patient was informed of access to emergency care. If patient were to develop any significant symptomatology, suicidal ideation, homicidal ideation, any concerns, or feel unsafe at any time they are to call the clinic and if unable to get immediate assistance should immediately call 911 or go to the nearest emergency room.  The patient  is advised to remove or secure (lock away) all lethal weapons (including guns) and sharps (including razors, scissors, knives, etc.).  All medications (including any prescribed and any over the counter medications) should be stored in a safe and secured location that is not obtainable by children/adolescents.  Patient was given an opportunity and encouraged to ask questions about their medication, illness, and treatment. Patient contracted verbally for the following: If you are experiencing an emotional crisis or have thoughts of harming yourself or others, please go to your nearest local emergency room or call 911. Will continue to re-assess medication response and side effects frequently to establish efficacy and ensure safety. Risks, any black box warnings, side effects, off label usage, and benefits of medication and treatment discussed with patient, along with potential adverse side effects of current and/or newly prescribed medication, alternative treatment options, and OTC medications.  Patient verbalized understanding of potential risks, any off label use of medication, any black box warnings, and any side effects in their own words. The patient verbalized understanding and agreed to comply with the safety plan discussed in their own words.  Patient given the number to the office. Number also available to the 24- hour suicide hotline.          MEDS ORDERED DURING VISIT:  New Medications Ordered This Visit   Medications   • FLUoxetine (PROzac) 40 MG capsule     Sig: Take 1 capsule by mouth Daily.     Dispense:  30 capsule     Refill:  0   • hydrOXYzine (ATARAX) 25 MG tablet     Sig: Take 1 tablet by mouth At Night As Needed (anxiety and/or sleep).     Dispense:  30 tablet     Refill:  0       Return in about 4 weeks (around 11/17/2021), or if symptoms worsen or fail to improve, for Next scheduled follow up and Recheck.           Functional Status: Moderate impairment     Prognosis: Fair with Ongoing Treatment              This document has been electronically signed by ARVIN Tamez  October 20, 2021 09:28 EDT    Some of the data in this electronic note has been brought forward from a previous encounter, any necessary changes have been made, it has been reviewed by this APRN, and it is accurate.    Please note that portions of this note were completed with a voice recognition program. Efforts were made to edit dictation, but occasionally words are mistranscribed.

## 2021-10-31 DIAGNOSIS — D50.8 IRON DEFICIENCY ANEMIA SECONDARY TO INADEQUATE DIETARY IRON INTAKE: ICD-10-CM

## 2021-10-31 DIAGNOSIS — Z30.41 ENCOUNTER FOR SURVEILLANCE OF CONTRACEPTIVE PILLS: ICD-10-CM

## 2021-11-01 RX ORDER — DOXYCYCLINE HYCLATE 50 MG/1
324 CAPSULE, GELATIN COATED ORAL
Qty: 90 TABLET | Refills: 0 | OUTPATIENT
Start: 2021-11-01

## 2021-11-01 RX ORDER — DROSPIRENONE AND ETHINYL ESTRADIOL 0.03MG-3MG
1 KIT ORAL DAILY
Qty: 28 TABLET | Refills: 3 | Status: SHIPPED | OUTPATIENT
Start: 2021-11-01 | End: 2022-02-08 | Stop reason: SDUPTHER

## 2021-11-01 NOTE — TELEPHONE ENCOUNTER
She is due for her annual in Jan. See if she will go ahead and schedule that. i'll send the birth control until then . I don't think we need to keep her on the iron. We can recheck in Jan and add back on if needed

## 2021-11-01 NOTE — TELEPHONE ENCOUNTER
Rx Refill Note  Requested Prescriptions     Pending Prescriptions Disp Refills   • drospirenone-ethinyl estradiol (TICO,OCELLA) 3-0.03 MG per tablet 28 tablet 10     Sig: Take 1 tablet by mouth Daily.      Last office visit with prescribing clinician: 7/9/2021      Next office visit with prescribing clinician: Visit date not found     Last Fill Date: 02/19/2021  Quantity: 28  Refills: 10    April DIVYA Chase MA  11/01/21, 10:57 EDT     Patient doesn't have an upcoming appointment. Okay to refill medication?

## 2021-11-17 ENCOUNTER — TELEMEDICINE (OUTPATIENT)
Dept: PSYCHIATRY | Facility: CLINIC | Age: 26
End: 2021-11-17

## 2021-11-17 DIAGNOSIS — F33.1 MAJOR DEPRESSIVE DISORDER, RECURRENT EPISODE, MODERATE (HCC): Primary | Chronic | ICD-10-CM

## 2021-11-17 DIAGNOSIS — F41.9 ANXIETY DISORDER, UNSPECIFIED TYPE: Chronic | ICD-10-CM

## 2021-11-17 DIAGNOSIS — G47.9 SLEEPING DIFFICULTIES: ICD-10-CM

## 2021-11-17 PROCEDURE — 99214 OFFICE O/P EST MOD 30 MIN: CPT | Performed by: NURSE PRACTITIONER

## 2021-11-17 RX ORDER — FLUOXETINE HYDROCHLORIDE 40 MG/1
40 CAPSULE ORAL DAILY
Qty: 30 CAPSULE | Refills: 0 | Status: SHIPPED | OUTPATIENT
Start: 2021-11-17 | End: 2021-12-21 | Stop reason: SDUPTHER

## 2021-11-17 RX ORDER — HYDROXYZINE HYDROCHLORIDE 25 MG/1
25 TABLET, FILM COATED ORAL NIGHTLY PRN
Qty: 30 TABLET | Refills: 0 | Status: SHIPPED | OUTPATIENT
Start: 2021-11-17 | End: 2021-12-21 | Stop reason: SDUPTHER

## 2021-11-17 RX ORDER — LAMOTRIGINE 25 MG/1
25 TABLET ORAL DAILY
Qty: 30 TABLET | Refills: 0 | Status: SHIPPED | OUTPATIENT
Start: 2021-11-17 | End: 2021-12-21 | Stop reason: SDUPTHER

## 2021-11-17 NOTE — PROGRESS NOTES
"This provider is located at the [facility] (through [facility]), [address] [place], [facility], [address] using a secure [employer] Video Visit through [employer]. Patient is being seen remotely via telehealth at their home address in [place], and stated they are in a secure environment for this session. The patient's condition being diagnosed/treated is appropriate for telemedicine. The provider identified herself as well as her credentials.   The patient, and/or patients guardian, consent to be seen remotely, and when consent is given they understand that the consent allows for patient identifiable information to be sent to a third party as needed.   They may refuse to be seen remotely at any time. The electronic data is encrypted and password protected, and the patient and/or guardian has been advised of the potential risks to privacy not withstanding such measures.    You have chosen to receive care through a telehealth visit.  Do you consent to use a video/audio connection for your medical care today? Yes        Subjective   Maddy Chisholm is a 26 y.o. female who presents today for follow up    Chief Complaint: Depression, anxiety, sleeping difficulties, and patient concern for borderline personality disorder    Accompanied by: The patient is alone at today's encounter    History of Present Illness:   The patient describes her mood as not very good over the last few weeks.  The patient states she has been getting \"morbid jealousy\", and suspicion, with her boyfriend and doesn't like this.  The patient states her boyfriend is working alongside, and helping train, a new girl at work and this has been causing her a lot of stress or jealousy.  The patient states she has been having compulsive thoughts to self-harm, but she hasn't self harmed at all and doesn't want to.  The patient rates her depression at a 8-9/10 on a 0-10 scale, with 10 being the worst.  The patient reports her " symptoms of depression as being fatigued, lack of motivation, and feeling like everything is too much to handle.  The patient rates her anxiety at a 8-9/10 on a 0-10 scale, with 10 being the worst.  The patient reports her symptoms of anxiety as excessive worry and jealousy with her boyfriend.  The patient reports her appetite as good.  The patient states she feels she may be overeating.  The patient reports her sleep as good.  The patient states she averages 6-7 hours of sleep each night.  The patient denies recent nightmares, she states her dreams are better. The patient denies any new medical problems or changes in medications since last appointment with this facility.  The patient reports compliance with her current medication regimen.  The patient denies any side effects from her current medication regimen.  The patient denies any abnormal muscle movements or tics.   The patient would like to adjust her medications at this visit to help with her continued worsened moods.  The patient denies any suicidal or homicidal ideations, plans, or intent at today's encounter and is convincing.  The patient denies any auditory hallucinations or visual hallucinations.  The patient does not endorse any significant symptoms consistent with oskar or psychosis at today's encounter.        Prior Psychiatric Medications:  Lexapro - decreased her libido so she stopped taking it (can't remember if it helped her mood or not)  Hydroxyzine 25 mg by mouth once daily at bedtime  Prozac  Lamictal      Last Menstrual Period:  3 weeks ago, states her menstrual cycle is due next week.  The patient was educated that her prescribed medications can have potential risk to a developing fetus. The patient is advised to contact this APRN/this office if she becomes pregnant or plans to become pregnant.  Pt verbalizes understanding and acknowledged agreement with this plan in her own words.        The following portions of the patient's history were  reviewed and updated as appropriate: allergies, current medications, past family history, past medical history, past social history, past surgical history and problem list.            Past Medical History:  Past Medical History:   Diagnosis Date   • Anemia    • Anxiety    • Depression    • Vitamin B12 deficiency        Social History:  Social History     Socioeconomic History   • Marital status: Single   • Number of children: 0   Tobacco Use   • Smoking status: Former Smoker     Packs/day: 0.20     Years: 0.25     Pack years: 0.05     Types: Cigarettes     Start date: 2020     Quit date: 8/15/2020     Years since quittin.2   • Smokeless tobacco: Never Used   • Tobacco comment: smoked for 5 years  (black and milds for 5 years then restarted 2020 and quit 2020   Vaping Use   • Vaping Use: Never used   Substance and Sexual Activity   • Alcohol use: Yes     Comment: Only occasionally, states not frequently   • Drug use: Not Currently     Comment: The patient states in her past she has used marijuanna, but not currently   • Sexual activity: Yes     Partners: Male     Birth control/protection: OCP       Family History:  Family History   Adopted: Yes       Past Surgical History:  Past Surgical History:   Procedure Laterality Date   • NO PAST SURGERIES         Problem List:  Patient Active Problem List   Diagnosis   • Iron deficiency anemia secondary to inadequate dietary iron intake   • B12 deficiency   • Anxiety   • BV (bacterial vaginosis)       Allergy:   No Known Allergies     Current Medications:   Current Outpatient Medications   Medication Sig Dispense Refill   • drospirenone-ethinyl estradiol (TICO,OCELLA) 3-0.03 MG per tablet Take 1 tablet by mouth Daily. 28 tablet 3   • ferrous gluconate (FERGON) 324 MG tablet Take 1 tablet by mouth 3 (Three) Times a Day With Meals. 90 tablet 0   • FLUoxetine (PROzac) 40 MG capsule Take 1 capsule by mouth Daily. 30 capsule 0   • fluticasone (FLONASE) 50 MCG/ACT  nasal spray SPRAY TWO SPRAYS IN EACH NOSTRIL ONCE DAILY AS DIRECTED 16 g 3   • hydrOXYzine (ATARAX) 25 MG tablet Take 1 tablet by mouth At Night As Needed (anxiety and/or sleep). 30 tablet 0   • lamoTRIgine (LaMICtal) 25 MG tablet Take 1 tablet by mouth Daily. 30 tablet 0     Current Facility-Administered Medications   Medication Dose Route Frequency Provider Last Rate Last Admin   • cyanocobalamin injection 1,000 mcg  1,000 mcg Intramuscular Q28 Days Vero Lopez APRN   1,000 mcg at 09/16/21 1324       Review of Symptoms:    Review of Systems   Constitutional: Positive for activity change, appetite change and fatigue. Negative for chills, fever, unexpected weight gain and unexpected weight loss.   HENT: Negative.    Eyes: Negative.    Respiratory: Negative.    Cardiovascular: Negative.    Gastrointestinal: Negative.    Endocrine: Negative.    Genitourinary: Negative.    Musculoskeletal: Negative.    Skin: Negative.    Neurological: Negative.    Psychiatric/Behavioral: Positive for agitation, behavioral problems, decreased concentration, sleep disturbance, depressed mood and stress. Negative for dysphoric mood, hallucinations, suicidal ideas and negative for hyperactivity. The patient is nervous/anxious.          Physical Exam:   not currently breastfeeding. There is no height or weight on file to calculate BMI.   Due to the remote nature of this encounter (virtual encounter), vitals were unable to be obtained.  Height stated at 66.5 inches.  Weight stated at 160's pounds.      Physical Exam  Constitutional:       Appearance: Normal appearance.   Neurological:      Mental Status: She is alert and oriented to person, place, and time.   Psychiatric:         Attention and Perception: Attention normal.         Mood and Affect: Mood and affect normal.         Speech: Speech normal.         Behavior: Behavior normal. Behavior is cooperative.         Thought Content: Thought content normal. Thought content does not  include homicidal or suicidal ideation. Thought content does not include homicidal or suicidal plan.         Cognition and Memory: Cognition and memory normal.         Judgment: Judgment normal.           Mental Status Exam:   Hygiene:   good  Cooperation:  Cooperative  Eye Contact:  Good  Psychomotor Behavior:  Appropriate  Affect:  Appropriate  Mood: normal  Hopelessness: Denies  Speech:  Normal  Thought Process:  Linear  Thought Content:  Mood congruent  Suicidal:  None  Homicidal:  None  Hallucinations:  None  Delusion:  None  Memory:  Intact  Orientation:  Person, Place, Time and Situation  Reliability:  good  Insight:  Good  Judgement:  Good  Impulse Control:  Good  Physical/Medical Issues:  No        PHQ-9 Depression Screening  Little interest or pleasure in doing things? (P) 1   Feeling down, depressed, or hopeless? (P) 3   Trouble falling or staying asleep, or sleeping too much? (P) 1   Feeling tired or having little energy? (P) 1   Poor appetite or overeating? (P) 1   Feeling bad about yourself - or that you are a failure or have let yourself or your family down? (P) 3   Trouble concentrating on things, such as reading the newspaper or watching television? (P) 1   Moving or speaking so slowly that other people could have noticed? Or the opposite - being so fidgety or restless that you have been moving around a lot more than usual? (P) 1   Thoughts that you would be better off dead, or of hurting yourself in some way? (P) 3   PHQ-9 Total Score (P) 15   If you checked off any problems, how difficult have these problems made it for you to do your work, take care of things at home, or get along with other people? (P) Extremely dIfficult     PHQ-9 Total Score: (P) 15      Feeling nervous, anxious or on edge: (P) Several days  Not being able to stop or control worrying: (P) Several days  Worrying too much about different things: (P) Nearly every day  Trouble Relaxing: (P) Several days  Being so restless that it  is hard to sit still: (P) More than half the days  Feeling afraid as if something awful might happen: (P) More than half the days  Becoming easily annoyed or irritable: (P) Nearly every day  JAIRO 7 Total Score: (P) 13  If you checked any problems, how difficult have these problems made it for you to do your work, take care of things at home, or get along with other people: (P) Extremely difficult      PROMIS scale screening tool that patient filled out virtually reviewed by this APRN at today's encounter.        Lab Results:   No visits with results within 1 Month(s) from this visit.   Latest known visit with results is:   Lab on 02/17/2021   Component Date Value Ref Range Status   • WBC 02/17/2021 5.07  3.40 - 10.80 10*3/mm3 Final   • RBC 02/17/2021 4.80  3.77 - 5.28 10*6/mm3 Final   • Hemoglobin 02/17/2021 12.5  12.0 - 15.9 g/dL Final   • Hematocrit 02/17/2021 37.4  34.0 - 46.6 % Final   • MCV 02/17/2021 77.9* 79.0 - 97.0 fL Final   • MCH 02/17/2021 26.0* 26.6 - 33.0 pg Final   • MCHC 02/17/2021 33.4  31.5 - 35.7 g/dL Final   • RDW 02/17/2021 13.5  12.3 - 15.4 % Final   • RDW-SD 02/17/2021 37.6  37.0 - 54.0 fl Final   • MPV 02/17/2021 10.9  6.0 - 12.0 fL Final   • Platelets 02/17/2021 215  140 - 450 10*3/mm3 Final   • Glucose 02/17/2021 83  65 - 99 mg/dL Final   • BUN 02/17/2021 12  6 - 20 mg/dL Final   • Creatinine 02/17/2021 0.62  0.57 - 1.00 mg/dL Final   • Sodium 02/17/2021 139  136 - 145 mmol/L Final   • Potassium 02/17/2021 3.8  3.5 - 5.2 mmol/L Final   • Chloride 02/17/2021 105  98 - 107 mmol/L Final   • CO2 02/17/2021 23.5  22.0 - 29.0 mmol/L Final   • Calcium 02/17/2021 10.0  8.6 - 10.5 mg/dL Final   • Total Protein 02/17/2021 6.9  6.0 - 8.5 g/dL Final   • Albumin 02/17/2021 4.20  3.50 - 5.20 g/dL Final   • ALT (SGPT) 02/17/2021 9  1 - 33 U/L Final   • AST (SGOT) 02/17/2021 24  1 - 32 U/L Final   • Alkaline Phosphatase 02/17/2021 47  39 - 117 U/L Final   • Total Bilirubin 02/17/2021 <0.2  0.0 - 1.2 mg/dL  Final   • eGFR   Amer 02/17/2021 141  >60 mL/min/1.73 Final   • Globulin 02/17/2021 2.7  gm/dL Final   • A/G Ratio 02/17/2021 1.6  g/dL Final   • BUN/Creatinine Ratio 02/17/2021 19.4  7.0 - 25.0 Final   • Anion Gap 02/17/2021 10.5  5.0 - 15.0 mmol/L Final   • Vitamin B-12 02/17/2021 481  211 - 946 pg/mL Final   • Ferritin 02/17/2021 36.70  13.00 - 150.00 ng/mL Final   • Hepatitis B Surface Ag 02/17/2021 Non-Reactive  Non-Reactive Final   • Hep A IgM 02/17/2021 Non-Reactive  Non-Reactive Final   • Hep B C IgM 02/17/2021 Non-Reactive  Non-Reactive Final   • Hepatitis C Ab 02/17/2021 Non-Reactive  Non-Reactive Final   • HIV-1/ HIV-2 02/17/2021 Non-Reactive  Non-Reactive Final    A non-reactive test result does not preclude the possibility of exposure to HIV or infection with HIV. An antibody response to recent exposure may take several months to reach detectable levels.   • HSV 1 IgM 02/17/2021 <1:10  <1:10 titer Final   • HSV 2 IgM 02/17/2021 <1:10  <1:10 titer Final    HSV 1 and HSV 2 share many cross-reacting antigens. Elevated titers  to both HSV 1 and HSV 2 may represent crossreactive HSV antibodies  rather than exposure to both HSV 1 and HSV 2.  Results for this test are for research purposes only by the assay's  . The performance characteristics of this product have  not been established.  Results should not be used as a diagnostic  procedure without confirmation of the diagnosis by another medically  established diagnostic product or procedure.   • HSV 1 IgG, Type Specific 02/17/2021 <0.91  0.00 - 0.90 index Final                                     Negative        <0.91                                   Equivocal 0.91 - 1.09                                   Positive        >1.09   Note: Negative indicates no antibodies detected to   HSV-1. Equivocal may suggest early infection.  If   clinically appropriate, retest at later date. Positive   indicates antibodies detected to HSV-1.   • HSV 2  IgG 02/17/2021 <0.91  0.00 - 0.90 index Final                                     Negative        <0.91                                   Equivocal 0.91 - 1.09                                   Positive        >1.09   Note: Negative indicates no antibodies detected to   HSV-2. Equivocal may suggest early infection.  If   clinically appropriate, retest at later date. Positive   indicates antibodies detected to HSV-2.   • RPR 02/17/2021 Non-Reactive  Non-Reactive Final         Assessment/Plan   Problems Addressed this Visit     None      Visit Diagnoses     Major depressive disorder, recurrent episode, moderate (HCC)  (Chronic)   -  Primary    R/O BPD    Relevant Medications    FLUoxetine (PROzac) 40 MG capsule    hydrOXYzine (ATARAX) 25 MG tablet    lamoTRIgine (LaMICtal) 25 MG tablet    Anxiety disorder, unspecified type  (Chronic)       Relevant Medications    FLUoxetine (PROzac) 40 MG capsule    hydrOXYzine (ATARAX) 25 MG tablet    Sleeping difficulties        Relevant Medications    hydrOXYzine (ATARAX) 25 MG tablet      Diagnoses       Codes Comments    Major depressive disorder, recurrent episode, moderate (HCC)    -  Primary ICD-10-CM: F33.1  ICD-9-CM: 296.32 R/O BPD    Anxiety disorder, unspecified type     ICD-10-CM: F41.9  ICD-9-CM: 300.00     Sleeping difficulties     ICD-10-CM: G47.9  ICD-9-CM: 780.50           Visit Diagnoses:    ICD-10-CM ICD-9-CM   1. Major depressive disorder, recurrent episode, moderate (HCC)  F33.1 296.32   2. Anxiety disorder, unspecified type  F41.9 300.00   3. Sleeping difficulties  G47.9 780.50          GOALS:  Short Term Goals: Patient will be compliant with medication, and patient will have no significant medication related side effects.  Patient will be engaged in psychotherapy as indicated.  Patient will report subjective improvement of symptoms.  Long term goals: To stabilize mood and treat/improve subjective symptoms, the patient will stay out of the hospital, the patient  will be at an optimal level of functioning, and the patient will take all medications as prescribed.  The patient verbalized understanding and agreement with goals that were mutually set.      TREATMENT PLAN: START supportive psychotherapy efforts and TAKE medications as indicated.  Medication and treatment options, both pharmacological and non-pharmacological treatment options, discussed during today's visit, including any off label use of medication. Patient acknowledged and verbally consented with current treatment plan and was educated on the importance of compliance with treatment and follow-up appointments.      - Continue Prozac 40 mg by mouth once daily for mood.  - Continue hydroxyzine 25 mg by mouth once daily at bedtime as needed for sleep and/or anxiety.  - Start lamotrigine 25 mg by mouth once daily for mood.  - The patient would like to start psychotherapy through the behavioral health virtual clinic.  A referral has been placed.    Patient screened positive for depression based on a PHQ-9 score of 15 on 11/16/2021. Follow-up recommendations include: Prescribed antidepressant medication treatment, Suicide Risk Assessment performed and start psychotherapy.      MEDICATION ISSUES:  Discussed medication options and treatment plan of prescribed medication, any off label use of medication, as well as the risks, benefits, any black box warnings including increased suicidality, and side effects including but not limited to potential falls, dizziness, possible impaired driving, GI side effects (change in appetite, abdominal discomfort, nausea, vomiting, diarrhea, and/or constipation), dry mouth, somnolence, sedation, insomnia, activation, agitation, irritation, tremors, abnormal muscle movements or disorders, headache, sweating, possible bruising or rare bleeding, electrolyte and/or fluid abnormalities, change in blood pressure/heart rate/and or heart rhythm, sexual dysfunction, and metabolic adversities among  others. Patient and/or guardian agreeable to call the office with any worsening of symptoms or onset of side effects, or if any concerns or questions arise.  The contact information for the office is made available to the patient and/or guardian.  Patient and/or guardian agreeable to call 911 or go to the nearest ER should they begin having any SI/HI, or if any urgent concerns arise. No medication side effects or related complaints today.    This APRN has discussed the benefits and risks of starting Lamictal (Lamotrigine).  The side effects of Lamictal can include a benign rash, blurred or double vision, dizziness, ataxia, sedation, headache, tremor, insomnia, poor coordination, fatigue,  nausea, vomiting, dyspepsia, rhinitis, infection, pharyngitis, asthenia, a rare but serious rash, rare multi-organ failure associated with Santana-Jean Syndrome, toxic epidermal necrolysis, drug hypersensitivity syndrome, rare blood dyscrasias, rare aseptic meningitis, rare sudden unexplained deaths in people with epilepsy, withdrawal seizures upon abrupt withdrawal, and rare activation of suicidal ideation and behavior (suicidality).  This APRN has discussed with the patient that a very slow dose titration when starting Lamictal may reduce the incidence of skin rash and other side effects.  The dosage should not be titrated upwards or increased faster than recommended due to the possibility of the discussed side effects and risk of development of a skin rash (which can become life threatening).    This APRN has also discussed with the patient that if the patient stops taking the Lamictal for 3-5 days or longer, it will be necessary to restart the drug with the initial dose titration, as rashes have been reported on reexposure.    This APRN has also discussed with the patient that the patient should avoid new medications, foods, or products during the first 3 months of Lamictal treatment in order to decrease the risk of  unrelated rash.  The patient should also not start Lamictal within 2 weeks of a viral infection, a rash, or a vaccination.  Also, if the patient and Provider decide to stop the Lamictal, the patient will follow the directions of this APRN/this office as a guided taper over about two weeks is appropriate due to the risk of relapse in mood or bipolar disorder, the risk of seizures in those with epilepsy, and discontinuation symptoms upon rapid discontinuation of Lamictal.    The patient verbalizes understanding of benefits and risks as discussed, the patient feels the benefits outweigh the risks and is agreeable to start Lamictal via a slow titration schedule as discussed.  The patient is advised should any side effects or rash develops they are to stop the Lamictal immediately and contact this APRN/this office or go to the emergency department immediately.  The patient verbalizes understanding and agreement with treatment plan in their own words.      SUICIDE RISK ASSESSMENT AND SAFETY PLAN: Unalterable demographics and a history of mental health intervention indicate this patient is in a high risk category compared to the general population. At present, the patient denies active SI/HI, intentions, or plans at this time and agrees to seek immediate care should such thoughts develop. The patient verbalizes understanding of how to access emergency care if needed and agrees to do so. Consideration of suicide risk and protective factors such as history, current presentation, individual strengths and weaknesses, psychosocial and environmental stressors and variables, psychiatric illness and symptoms, medical conditions and pain, took place in this interview. Based on those considerations, the patient is determined: within individual baseline and presenting no imminent risk for suicide or homicide. Other recommendations: The patient does not meet the criteria for inpatient admission and is not a safety risk to self or  others at today's visit. Inpatient treatment offers no significant advantages over outpatient treatment for this patient at today's visit.  The patient was given ample time for questions and fully participated in treatment planning.  The patient was encouraged to call the clinic with any questions or concerns.  The patient was informed of access to emergency care. If patient were to develop any significant symptomatology, suicidal ideation, homicidal ideation, any concerns, or feel unsafe at any time they are to call the clinic and if unable to get immediate assistance should immediately call 911 or go to the nearest emergency room.  Patient contracted verbally for the following: If you are experiencing an emotional crisis or have thoughts of harming yourself or others, please go to your nearest local emergency room or call 911. Will continue to re-assess medication response and side effects frequently to establish efficacy and ensure safety. Risks, any black box warnings, side effects, off label usage, and benefits of medication and treatment discussed with patient, along with potential adverse side effects of current and/or newly prescribed medication, alternative treatment options, and OTC medications.  Patient verbalized understanding of potential risks, any off label use of medication, any black box warnings, and any side effects in their own words. The patient verbalized understanding and agreed to comply with the safety plan discussed in their own words.  Patient given the number to the office. Number also discussed of the 24- hour suicide hotline.           MEDS ORDERED DURING VISIT:  New Medications Ordered This Visit   Medications   • FLUoxetine (PROzac) 40 MG capsule     Sig: Take 1 capsule by mouth Daily.     Dispense:  30 capsule     Refill:  0   • hydrOXYzine (ATARAX) 25 MG tablet     Sig: Take 1 tablet by mouth At Night As Needed (anxiety and/or sleep).     Dispense:  30 tablet     Refill:  0   •  lamoTRIgine (LaMICtal) 25 MG tablet     Sig: Take 1 tablet by mouth Daily.     Dispense:  30 tablet     Refill:  0       Return in about 4 weeks (around 12/15/2021), or if symptoms worsen or fail to improve, for Next scheduled follow up and Recheck.           Functional Status: Moderate impairment     Prognosis: Fair with Ongoing Treatment             This document has been electronically signed by ARVIN Tamez  November 17, 2021 09:25 EST    Some of the data in this electronic note has been brought forward from a previous encounter, any necessary changes have been made, it has been reviewed by this APRN, and it is accurate.    Please note that portions of this note were completed with a voice recognition program. Efforts were made to edit dictation, but occasionally words are mistranscribed.

## 2021-11-22 ENCOUNTER — TELEPHONE (OUTPATIENT)
Dept: PSYCHIATRY | Facility: CLINIC | Age: 26
End: 2021-11-22

## 2021-11-22 NOTE — TELEPHONE ENCOUNTER
Patient called back to ask if there is anything to replace the lamictal that will not decrease the effectiveness of the birth control? Please advise?  Patient said no florinda Mena may respond tomorrow.    Thank You

## 2021-11-22 NOTE — TELEPHONE ENCOUNTER
Patient called and is concerned that Lamictal could be interfering with her oral contraceptive? She wants to know what your thoughts are about this and what should she do? Please advise?    Patient is aware that provider is out today. Still going to forward to Mansi Blancas, due to nature of concern.     (She did not want to name oral contraceptive? I did ask??)

## 2021-11-22 NOTE — TELEPHONE ENCOUNTER
Please tell the patient that birth control can affect the dose of the Lamictal so it may need increased or adjusted but not always. Please have patient to follow-up with provider for anymore questions.

## 2021-11-22 NOTE — TELEPHONE ENCOUNTER
Called patient LVM relaying Mansi [place] note also let her know that I would contact her tomorrow if Cristina Mena had anything to add.    Thank You

## 2021-11-22 NOTE — TELEPHONE ENCOUNTER
It doesn't decrease the Effectiveness of the birth control it decreases the effectiveness of the Lamictal and the dose just needs to be adjusted that is all.

## 2021-11-23 ENCOUNTER — TELEPHONE (OUTPATIENT)
Dept: PSYCHIATRY | Facility: CLINIC | Age: 26
End: 2021-11-23

## 2021-11-23 NOTE — TELEPHONE ENCOUNTER
Spoke to patient, she is not having trouble with her cycle, she has not actually started Lamictal yet as pharmacist told her that it may interact with her oral contraceptive.

## 2021-11-23 NOTE — TELEPHONE ENCOUNTER
I agree with Alicia, her birth control can cause the efficacy of Lamictal to be decreased, but the Lamictal should not interfere with her birth control.  Is she having trouble with her cycle?

## 2021-11-23 NOTE — TELEPHONE ENCOUNTER
Cristina, please see yesterdays telephone note for this patient. 11/22/21 (For some reason it closed the note)    If you have any further instructions, please let me know?    Thank You

## 2021-11-29 ENCOUNTER — CLINICAL SUPPORT (OUTPATIENT)
Dept: INTERNAL MEDICINE | Facility: CLINIC | Age: 26
End: 2021-11-29

## 2021-11-29 DIAGNOSIS — E53.8 B12 DEFICIENCY: Chronic | ICD-10-CM

## 2021-11-29 PROCEDURE — 96372 THER/PROPH/DIAG INJ SC/IM: CPT | Performed by: NURSE PRACTITIONER

## 2021-11-29 RX ADMIN — CYANOCOBALAMIN 1000 MCG: 1000 INJECTION, SOLUTION INTRAMUSCULAR; SUBCUTANEOUS at 13:25

## 2021-12-03 ENCOUNTER — LAB (OUTPATIENT)
Dept: LAB | Facility: HOSPITAL | Age: 26
End: 2021-12-03

## 2021-12-03 ENCOUNTER — OFFICE VISIT (OUTPATIENT)
Dept: INTERNAL MEDICINE | Facility: CLINIC | Age: 26
End: 2021-12-03

## 2021-12-03 VITALS
HEIGHT: 66 IN | TEMPERATURE: 97.4 F | HEART RATE: 124 BPM | RESPIRATION RATE: 16 BRPM | SYSTOLIC BLOOD PRESSURE: 128 MMHG | DIASTOLIC BLOOD PRESSURE: 78 MMHG | WEIGHT: 169 LBS | OXYGEN SATURATION: 97 % | BODY MASS INDEX: 27.16 KG/M2

## 2021-12-03 DIAGNOSIS — J40 BRONCHITIS: Primary | ICD-10-CM

## 2021-12-03 DIAGNOSIS — J40 BRONCHITIS: ICD-10-CM

## 2021-12-03 PROCEDURE — 99213 OFFICE O/P EST LOW 20 MIN: CPT | Performed by: PHYSICIAN ASSISTANT

## 2021-12-03 PROCEDURE — U0004 COV-19 TEST NON-CDC HGH THRU: HCPCS | Performed by: PHYSICIAN ASSISTANT

## 2021-12-03 RX ORDER — AZITHROMYCIN 250 MG/1
TABLET, FILM COATED ORAL
Qty: 6 TABLET | Refills: 0 | Status: SHIPPED | OUTPATIENT
Start: 2021-12-03 | End: 2022-02-14

## 2021-12-03 RX ORDER — BENZONATATE 100 MG/1
100-200 CAPSULE ORAL 3 TIMES DAILY PRN
Qty: 30 CAPSULE | Refills: 0 | Status: SHIPPED | OUTPATIENT
Start: 2021-12-03 | End: 2022-02-14

## 2021-12-03 NOTE — PROGRESS NOTES
Chief Complaint  Generalized Body Aches, Cough, sinus drainage, and Diarrhea    Subjective          History of Present Illness  Maddy Chisholm presents to [facility] for   Bronchitis:  Has had a cough for a week that is not improving, has had some body aches, no fevers but has had mild chills. Cough is productive with white mucous, has had congestion and runny nose.  No vomiting, has had mild nausea and stomach ache on and off, has had mild diarrhea the last few days. Has felt wheezing at times also.       Review of Systems   Constitutional: Negative for fever and unexpected weight loss.   HENT: Positive for congestion, rhinorrhea and sinus pressure. Negative for ear pain and sore throat.    Respiratory: Positive for cough and wheezing. Negative for shortness of breath.    Cardiovascular: Negative for chest pain and palpitations.   Gastrointestinal: Positive for diarrhea. Negative for abdominal pain, nausea and vomiting.   Musculoskeletal: Positive for myalgias.   Skin: Negative for rash.   Neurological: Positive for headache.       The following portions of the patient's history were reviewed and updated as appropriate: allergies, current medications, past family history, past medical history, past social history, past surgical history and problem list.  No Known Allergies  Current Outpatient Medications on File Prior to Visit   Medication Sig Dispense Refill   • drospirenone-ethinyl estradiol (TICO,OCELLA) 3-0.03 MG per tablet Take 1 tablet by mouth Daily. 28 tablet 3   • ferrous gluconate (FERGON) 324 MG tablet Take 1 tablet by mouth 3 (Three) Times a Day With Meals. 90 tablet 0   • FLUoxetine (PROzac) 40 MG capsule Take 1 capsule by mouth Daily. 30 capsule 0   • fluticasone (FLONASE) 50 MCG/ACT nasal spray SPRAY TWO SPRAYS IN EACH NOSTRIL ONCE DAILY AS DIRECTED 16 g 3   • hydrOXYzine (ATARAX) 25 MG tablet Take 1 tablet by mouth At Night As Needed (anxiety and/or sleep). 30 tablet 0  "  • lamoTRIgine (LaMICtal) 25 MG tablet Take 1 tablet by mouth Daily. 30 tablet 0     Current Facility-Administered Medications on File Prior to Visit   Medication Dose Route Frequency Provider Last Rate Last Admin   • cyanocobalamin injection 1,000 mcg  1,000 mcg Intramuscular Q28 Days Vero Lopez APRN   1,000 mcg at 21 1325       Social History     Tobacco Use   Smoking Status Former Smoker   • Packs/day: 0.20   • Years: 0.25   • Pack years: 0.05   • Types: Cigarettes   • Start date: 2020   • Quit date: 8/15/2020   • Years since quittin.3   Smokeless Tobacco Never Used   Tobacco Comment    smoked for 5 years  (black and milds for 5 years then restarted 2020 and quit 2020        Objective   Vital Signs:   Vitals:    21 1334   BP: 128/78   Pulse: (!) 124   Resp: 16   Temp: 97.4 °F (36.3 °C)   TempSrc: Infrared   SpO2: 97%   Weight: 76.7 kg (169 lb)   Height: 167.9 cm (66.1\")      Physical Exam  Vitals reviewed.   Constitutional:       General: She is not in acute distress.     Appearance: Normal appearance.   HENT:      Head: Normocephalic and atraumatic.   Eyes:      General: No scleral icterus.     Extraocular Movements: Extraocular movements intact.      Conjunctiva/sclera: Conjunctivae normal.   Cardiovascular:      Rate and Rhythm: Normal rate and regular rhythm.      Heart sounds: Normal heart sounds. No murmur heard.      Pulmonary:      Effort: Pulmonary effort is normal. No respiratory distress.      Breath sounds: No stridor. Wheezing (occ wheeze) present. No rhonchi.   Musculoskeletal:      Cervical back: Normal range of motion and neck supple.   Skin:     General: Skin is warm and dry.      Coloration: Skin is not jaundiced.   Neurological:      General: No focal deficit present.      Mental Status: She is alert and oriented to person, place, and time.      Gait: Gait normal.   Psychiatric:         Mood and Affect: Mood normal.         Behavior: Behavior normal.      "     Result Review :                New Medications Ordered This Visit   Medications   • azithromycin (Zithromax Z-Irving) 250 MG tablet     Sig: Take 2 tablets by mouth on day 1, then 1 tablet daily on days 2-5     Dispense:  6 tablet     Refill:  0   • benzonatate (Tessalon Perles) 100 MG capsule     Sig: Take 1-2 capsules by mouth 3 (Three) Times a Day As Needed for Cough.     Dispense:  30 capsule     Refill:  0          Assessment and Plan    Diagnoses and all orders for this visit:    1. Bronchitis (Primary)  Assessment & Plan:  Treat with zpack, tessalon prn. F/u if sx worsen or persist. covid send out test.    Orders:  -     COVID-19 PCR, [facility], NP SWAB IN [facility]/ORAL SWISH 24-30 HR TAT - Swab, Nasopharynx; Future  -     azithromycin (Zithromax Z-Irving) 250 MG tablet; Take 2 tablets by mouth on day 1, then 1 tablet daily on days 2-5  Dispense: 6 tablet; Refill: 0  -     benzonatate (Tessalon Perles) 100 MG capsule; Take 1-2 capsules by mouth 3 (Three) Times a Day As Needed for Cough.  Dispense: 30 capsule; Refill: 0      Follow Up   Return if symptoms worsen or fail to improve.    Follow up if symptoms worsen or persist or has new or concerning symptoms, go to ER for severe symptoms.   Reviewed common medication effects and side effects and advised to report side effects immediately, the patient expressed good understanding.  Encouraged medication compliance and the importance of keeping scheduled follow up appointments with me and any other providers.  If a referral was made please contact our office if you have not heard about an appointment in the next 2 weeks.   If labs or images are ordered we will contact you with the results within the next week.  If you have not heard from us after a week please call our office to inquire about the results.   Patient was given instructions and counseling regarding her condition or for health maintenance advice. Please see specific information  pulled into the AVS if appropriate.     Ana Jin PA-C    * Please note that portions of this note were completed with a voice recognition program.

## 2021-12-04 LAB — SARS-COV-2 RNA NOSE QL NAA+PROBE: NOT DETECTED

## 2021-12-06 ENCOUNTER — TELEPHONE (OUTPATIENT)
Dept: PSYCHIATRY | Facility: CLINIC | Age: 26
End: 2021-12-06

## 2021-12-06 NOTE — TELEPHONE ENCOUNTER
----- Message from ARVIN Leyva sent at 12/6/2021  7:55 AM EST -----  Regarding: FW: Therapy    ----- Message -----  From: Maddy Chisholm  Sent: 12/5/2021  12:07 PM EST  To: [facility] Clinical Pool  Subject: Therapy                                          I need an appointment soon, please dont forget about me.  let me know when you're available  Regard,   Maddy

## 2021-12-11 DIAGNOSIS — Z30.41 ENCOUNTER FOR SURVEILLANCE OF CONTRACEPTIVE PILLS: ICD-10-CM

## 2021-12-11 RX ORDER — DROSPIRENONE AND ETHINYL ESTRADIOL 0.03MG-3MG
1 KIT ORAL DAILY
Qty: 28 TABLET | Refills: 3 | Status: CANCELLED | OUTPATIENT
Start: 2021-12-11

## 2021-12-13 NOTE — TELEPHONE ENCOUNTER
Rx Refill Note  Requested Prescriptions     Pending Prescriptions Disp Refills   • drospirenone-ethinyl estradiol (TICO,OCELLA) 3-0.03 MG per tablet 28 tablet 3     Sig: Take 1 tablet by mouth Daily.      Last office visit with prescribing clinician: 7/9/2021      Next office visit with prescribing clinician: 1/21/2022     Last Fill Date: 11/01/2021  Quantity: 28  Refills: 3    April DIVYA Chase MA  12/13/21, 08:46 EST     Medication refilled to get the patient to her appointment, refill denied due to requesting too soon,

## 2021-12-21 ENCOUNTER — TELEMEDICINE (OUTPATIENT)
Dept: PSYCHIATRY | Facility: CLINIC | Age: 26
End: 2021-12-21

## 2021-12-21 DIAGNOSIS — F33.1 MAJOR DEPRESSIVE DISORDER, RECURRENT EPISODE, MODERATE (HCC): Primary | Chronic | ICD-10-CM

## 2021-12-21 DIAGNOSIS — G47.9 SLEEPING DIFFICULTIES: ICD-10-CM

## 2021-12-21 DIAGNOSIS — F41.9 ANXIETY DISORDER, UNSPECIFIED TYPE: Chronic | ICD-10-CM

## 2021-12-21 PROCEDURE — 99214 OFFICE O/P EST MOD 30 MIN: CPT | Performed by: NURSE PRACTITIONER

## 2021-12-21 RX ORDER — HYDROXYZINE HYDROCHLORIDE 25 MG/1
25 TABLET, FILM COATED ORAL NIGHTLY PRN
Qty: 30 TABLET | Refills: 1 | Status: SHIPPED | OUTPATIENT
Start: 2021-12-21 | End: 2022-01-22 | Stop reason: SDUPTHER

## 2021-12-21 RX ORDER — LAMOTRIGINE 25 MG/1
25 TABLET ORAL DAILY
Qty: 30 TABLET | Refills: 1 | Status: SHIPPED | OUTPATIENT
Start: 2021-12-21 | End: 2022-01-22 | Stop reason: SDUPTHER

## 2021-12-21 RX ORDER — FLUOXETINE HYDROCHLORIDE 40 MG/1
40 CAPSULE ORAL DAILY
Qty: 30 CAPSULE | Refills: 1 | Status: SHIPPED | OUTPATIENT
Start: 2021-12-21 | End: 2022-01-22 | Stop reason: SDUPTHER

## 2022-01-04 NOTE — PROGRESS NOTES
This provider is located at the [facility] (through [facility]), [address] [place], [facility], [address] using a secure [employer] Video Visit through [employer]. Patient is being seen remotely via telehealth at their home address in [place], and stated they are in a secure environment for this session. The patient's condition being diagnosed/treated is appropriate for telemedicine. The provider identified herself as well as her credentials.   The patient, and/or patients guardian, consent to be seen remotely, and when consent is given they understand that the consent allows for patient identifiable information to be sent to a third party as needed.   They may refuse to be seen remotely at any time. The electronic data is encrypted and password protected, and the patient and/or guardian has been advised of the potential risks to privacy not withstanding such measures.    You have chosen to receive care through a telehealth visit.  Do you consent to use a video/audio connection for your medical care today? Yes        Subjective   Maddy Chisholm is a 26 y.o. female who presents today for follow up    Chief Complaint: Depression, anxiety, sleeping difficulties, and patient concern for borderline personality disorder    Accompanied by: The patient is alone at today's encounter    History of Present Illness:   The patient describes her mood as improved over the last few weeks.  The patient states her anger has significantly went down, as well as her jealousy and suspicion with her boyfriend.  The patient rates her depression at a 6-7/10 on a 0-10 scale, with 10 being the worst.  The patient reports her symptoms of depression as over eating still, and not wanting to do things if her boyfriend is not around.  She states she has a lack of motivation to do things.  The patient rates her anxiety at a 6-7/10 on a 0-10 scale, with 10 being the worst.  The patient reports her symptoms of anxiety as  room air "feeling a \"little anxious\", but it is better than before.  The patient reports her appetite as good, or too good as she can over eat she reports with her depression.  The patient reports her sleep as good with the use of medication.  The patient states she averages 7 hours of sleep each night.  The patient reports some nightmares, she states her dreams have gotten worse as far as what she dreams about. The patient denies any new medical problems or changes in medications since last appointment with this facility other than she hasn't gotten her vitamin B12 shot yet this month.  She states she also hasn't started her iron pills yet because her Provider plans to recheck her levels in January.  The patient reports compliance with her current medication regimen.  The patient denies any side effects from her current medication regimen.  The patient denies any abnormal muscle movements or tics.   The patient states she can get very fidgety during movies, and she is not sure if this is ADHD, drinking too much caffeine, or her low vitamin B12 and iron levels.  She states if she doesn't drink coffee she gets tired and will sleep through movies, so she does drink coffee because it makes her feel happier and helps her be more alert because it is a stimulant.  She states she feels more hopeful now and is in a better head space.  The patient would like to not adjust or change her medications at this visit as she reports she likes how her current medication regimen is working for her, and how she feels this time.  The patient denies any suicidal or homicidal ideations, plans, or intent at today's encounter and is convincing.  The patient denies any auditory hallucinations or visual hallucinations.  The patient does not endorse any significant symptoms consistent with oskar or psychosis at today's encounter.  The patient denies any recent self harming thoughts or behaviors.        Prior Psychiatric Medications:  Lexapro - " decreased her libido so she stopped taking it (can't remember if it helped her mood or not)  Hydroxyzine 25 mg by mouth once daily at bedtime  Prozac  Lamictal      Last Menstrual Period:  3-4 weeks ago, states her menstrual cycle is due any day.  The patient was educated that her prescribed medications can have potential risk to a developing fetus. The patient is advised to contact this APRN/this office if she becomes pregnant or plans to become pregnant.  Pt verbalizes understanding and acknowledged agreement with this plan in her own words.        The following portions of the patient's history were reviewed and updated as appropriate: allergies, current medications, past family history, past medical history, past social history, past surgical history and problem list.            Past Medical History:  Past Medical History:   Diagnosis Date   • Anemia    • Anxiety    • Depression    • Vitamin B12 deficiency        Social History:  Social History     Socioeconomic History   • Marital status: Single   • Number of children: 0   Tobacco Use   • Smoking status: Former Smoker     Packs/day: 0.20     Years: 0.25     Pack years: 0.05     Types: Cigarettes     Start date: 2020     Quit date: 8/15/2020     Years since quittin.3   • Smokeless tobacco: Never Used   • Tobacco comment: smoked for 5 years  (black and milds for 5 years then restarted 2020 and quit 2020   Vaping Use   • Vaping Use: Never used   Substance and Sexual Activity   • Alcohol use: Yes     Comment: Only occasionally, states not frequently   • Drug use: Not Currently     Comment: The patient states in her past she has used marijuanna, but not currently   • Sexual activity: Yes     Partners: Male     Birth control/protection: OCP       Family History:  Family History   Adopted: Yes       Past Surgical History:  Past Surgical History:   Procedure Laterality Date   • NO PAST SURGERIES         Problem List:  Patient Active Problem List   Diagnosis    • Iron deficiency anemia secondary to inadequate dietary iron intake   • B12 deficiency   • Anxiety   • BV (bacterial vaginosis)   • Bronchitis       Allergy:   No Known Allergies     Current Medications:   Current Outpatient Medications   Medication Sig Dispense Refill   • azithromycin (Zithromax Z-Irving) 250 MG tablet Take 2 tablets by mouth on day 1, then 1 tablet daily on days 2-5 6 tablet 0   • benzonatate (Tessalon Perles) 100 MG capsule Take 1-2 capsules by mouth 3 (Three) Times a Day As Needed for Cough. 30 capsule 0   • drospirenone-ethinyl estradiol (TICO,OCELLA) 3-0.03 MG per tablet Take 1 tablet by mouth Daily. 28 tablet 3   • ferrous gluconate (FERGON) 324 MG tablet Take 1 tablet by mouth 3 (Three) Times a Day With Meals. 90 tablet 0   • FLUoxetine (PROzac) 40 MG capsule Take 1 capsule by mouth Daily. 30 capsule 1   • fluticasone (FLONASE) 50 MCG/ACT nasal spray SPRAY TWO SPRAYS IN EACH NOSTRIL ONCE DAILY AS DIRECTED 16 g 3   • hydrOXYzine (ATARAX) 25 MG tablet Take 1 tablet by mouth At Night As Needed (anxiety and/or sleep). 30 tablet 1   • lamoTRIgine (LaMICtal) 25 MG tablet Take 1 tablet by mouth Daily. 30 tablet 1     Current Facility-Administered Medications   Medication Dose Route Frequency Provider Last Rate Last Admin   • cyanocobalamin injection 1,000 mcg  1,000 mcg Intramuscular Q28 Days Vero Lopez APRN   1,000 mcg at 11/29/21 1325       Review of Symptoms:    Review of Systems   Constitutional: Positive for activity change, appetite change and fatigue. Negative for chills, fever, unexpected weight gain and unexpected weight loss.   HENT: Negative.    Eyes: Negative.    Respiratory: Negative.    Cardiovascular: Negative.    Gastrointestinal: Negative.    Endocrine: Negative.    Genitourinary: Negative.    Musculoskeletal: Negative.    Skin: Negative.    Neurological: Negative.    Psychiatric/Behavioral: Positive for agitation, behavioral problems, decreased concentration, sleep  disturbance (improved with medication), depressed mood and stress. Negative for dysphoric mood, hallucinations, self-injury, suicidal ideas and negative for hyperactivity. The patient is nervous/anxious.          Physical Exam:   not currently breastfeeding. There is no height or weight on file to calculate BMI.   Due to the remote nature of this encounter (virtual encounter), vitals were unable to be obtained.  Height stated at 66.5 inches.  Weight stated at 170's pounds.      Physical Exam  Constitutional:       Appearance: Normal appearance.   Neurological:      Mental Status: She is alert and oriented to person, place, and time.   Psychiatric:         Attention and Perception: Attention normal.         Mood and Affect: Mood and affect normal.         Speech: Speech normal.         Behavior: Behavior normal. Behavior is cooperative.         Thought Content: Thought content normal. Thought content does not include homicidal or suicidal ideation. Thought content does not include homicidal or suicidal plan.         Cognition and Memory: Cognition and memory normal.         Judgment: Judgment normal.           Mental Status Exam:   Hygiene:   good  Cooperation:  Cooperative  Eye Contact:  Good  Psychomotor Behavior:  Appropriate  Affect:  Appropriate  Mood: normal  Hopelessness: Denies  Speech:  Normal  Thought Process:  Linear  Thought Content:  Mood congruent  Suicidal:  None  Homicidal:  None  Hallucinations:  None  Delusion:  None  Memory:  Intact  Orientation:  Person, Place, Time and Situation  Reliability:  good  Insight:  Good  Judgement:  Good  Impulse Control:  Good  Physical/Medical Issues:  No          Lab Results:   Lab on 12/03/2021   Component Date Value Ref Range Status   • SARS-CoV-2 SHONDA 12/03/2021 Not Detected  Not Detected Final         Assessment/Plan   Problems Addressed this Visit     None      Visit Diagnoses     Major depressive disorder, recurrent episode, moderate (HCC)  (Chronic)   -   Primary    R/O BPD    Relevant Medications    lamoTRIgine (LaMICtal) 25 MG tablet    hydrOXYzine (ATARAX) 25 MG tablet    FLUoxetine (PROzac) 40 MG capsule    Anxiety disorder, unspecified type  (Chronic)       Relevant Medications    hydrOXYzine (ATARAX) 25 MG tablet    FLUoxetine (PROzac) 40 MG capsule    Sleeping difficulties        Relevant Medications    hydrOXYzine (ATARAX) 25 MG tablet      Diagnoses       Codes Comments    Major depressive disorder, recurrent episode, moderate (HCC)    -  Primary ICD-10-CM: F33.1  ICD-9-CM: 296.32 R/O BPD    Anxiety disorder, unspecified type     ICD-10-CM: F41.9  ICD-9-CM: 300.00     Sleeping difficulties     ICD-10-CM: G47.9  ICD-9-CM: 780.50           Visit Diagnoses:    ICD-10-CM ICD-9-CM   1. Major depressive disorder, recurrent episode, moderate (HCC)  F33.1 296.32   2. Anxiety disorder, unspecified type  F41.9 300.00   3. Sleeping difficulties  G47.9 780.50          GOALS:  Short Term Goals: Patient will be compliant with medication, and patient will have no significant medication related side effects.  Patient will be engaged in psychotherapy as indicated.  Patient will report subjective improvement of symptoms.  Long term goals: To stabilize mood and treat/improve subjective symptoms, the patient will stay out of the hospital, the patient will be at an optimal level of functioning, and the patient will take all medications as prescribed.  The patient verbalized understanding and agreement with goals that were mutually set.      TREATMENT PLAN: START supportive psychotherapy efforts and TAKE medications as indicated.  Medication and treatment options, both pharmacological and non-pharmacological treatment options, discussed during today's visit, including any off label use of medication. Patient acknowledged and verbally consented with current treatment plan and was educated on the importance of compliance with treatment and follow-up appointments.      - Continue  Prozac 40 mg by mouth once daily for mood.  - Continue hydroxyzine 25 mg by mouth once daily at bedtime as needed for sleep and/or anxiety.  - Continue lamotrigine 25 mg by mouth once daily for mood.  - The patient reports she missed the call that was made to her to start therapy, so she plans to reach back out to the office and schedule therapy through the behavioral health virtual clinic.  The patient reports she would like to start therapy after the holiday season.      MEDICATION ISSUES:  Discussed medication options and treatment plan of prescribed medication, any off label use of medication, as well as the risks, benefits, any black box warnings including increased suicidality, and side effects including but not limited to potential falls, dizziness, possible impaired driving, GI side effects (change in appetite, abdominal discomfort, nausea, vomiting, diarrhea, and/or constipation), dry mouth, somnolence, sedation, insomnia, activation, agitation, irritation, tremors, abnormal muscle movements or disorders, headache, sweating, possible bruising or rare bleeding, electrolyte and/or fluid abnormalities, change in blood pressure/heart rate/and or heart rhythm, sexual dysfunction, and metabolic adversities among others. Patient and/or guardian agreeable to call the office with any worsening of symptoms or onset of side effects, or if any concerns or questions arise.  The contact information for the office is made available to the patient and/or guardian.  Patient and/or guardian agreeable to call 911 or go to the nearest ER should they begin having any SI/HI, or if any urgent concerns arise. No medication side effects or related complaints today.    This APRN has discussed the benefits and risks of taking/continuing Lamictal (Lamotrigine).  The side effects of Lamictal can include a benign rash, blurred or double vision, dizziness, ataxia, sedation, headache, tremor, insomnia, poor coordination, fatigue,  nausea,  vomiting, dyspepsia, rhinitis, infection, pharyngitis, asthenia, a rare but serious rash, rare multi-organ failure associated with Santana-Jean Syndrome, toxic epidermal necrolysis, drug hypersensitivity syndrome, rare blood dyscrasias, rare aseptic meningitis, rare sudden unexplained deaths in people with epilepsy, withdrawal seizures upon abrupt withdrawal, and rare activation of suicidal ideation and behavior (suicidality).  This APRN has discussed that a very slow dose titration when starting, or changing doses, of Lamictal may reduce the incidence of skin rash and other side effects.  The dosage should not be titrated upwards or increased faster than recommended due to the possibility of the discussed side effects and risk of development of a skin rash (which can become life threatening).    This APRN has also discussed that if the patient stops taking the Lamictal for 3-5 days or longer, it will be necessary to restart the drug with an initial dose titration, as rashes have been reported on reexposure.  If the patient and Provider decide to stop the Lamictal, the patient will follow the directions of this APRN/this office as a guided taper over about two weeks is appropriate due to the risk of relapse in bipolar disorder with those with a mood or bipolar disorder, the risk of seizures in those with epilepsy, and discontinuation symptoms upon rapid discontinuation of Lamictal.    The patient verbalizes understanding of benefits and risks as discussed, the patient/guardian feels the benefits outweigh the risks and is agreeable to continue/take Lamictal as discussed.  The patient is advised should any side effects or rash develops they are to stop the Lamictal immediately and contact this APRN/this office or go to the emergency department immediately.  The patient verbalizes understanding and agreement with treatment plan in their own words.      SUICIDE RISK ASSESSMENT AND SAFETY PLAN: Unalterable  demographics and a history of mental health intervention indicate this patient is in a high risk category compared to the general population. At present, the patient denies active SI/HI, intentions, or plans at this time and agrees to seek immediate care should such thoughts develop. The patient verbalizes understanding of how to access emergency care if needed and agrees to do so. Consideration of suicide risk and protective factors such as history, current presentation, individual strengths and weaknesses, psychosocial and environmental stressors and variables, psychiatric illness and symptoms, medical conditions and pain, took place in this interview. Based on those considerations, the patient is determined: within individual baseline and presenting no imminent risk for suicide or homicide. Other recommendations: The patient does not meet the criteria for inpatient admission and is not a safety risk to self or others at today's visit. Inpatient treatment offers no significant advantages over outpatient treatment for this patient at today's visit.  The patient was given ample time for questions and fully participated in treatment planning.  The patient was encouraged to call the clinic with any questions or concerns.  The patient was informed of access to emergency care. If patient were to develop any significant symptomatology, suicidal ideation, homicidal ideation, any concerns, or feel unsafe at any time they are to call the clinic and if unable to get immediate assistance should immediately call 911 or go to the nearest emergency room.  Patient contracted verbally for the following: If you are experiencing an emotional crisis or have thoughts of harming yourself or others, please go to your nearest local emergency room or call 911. Will continue to re-assess medication response and side effects frequently to establish efficacy and ensure safety. Risks, any black box warnings, side effects, off label usage, and  benefits of medication and treatment discussed with patient, along with potential adverse side effects of current and/or newly prescribed medication, alternative treatment options, and OTC medications.  Patient verbalized understanding of potential risks, any off label use of medication, any black box warnings, and any side effects in their own words. The patient verbalized understanding and agreed to comply with the safety plan discussed in their own words.  Patient given the number to the office. Number also discussed of the 24- hour suicide hotline.           MEDS ORDERED DURING VISIT:  New Medications Ordered This Visit   Medications   • lamoTRIgine (LaMICtal) 25 MG tablet     Sig: Take 1 tablet by mouth Daily.     Dispense:  30 tablet     Refill:  1   • hydrOXYzine (ATARAX) 25 MG tablet     Sig: Take 1 tablet by mouth At Night As Needed (anxiety and/or sleep).     Dispense:  30 tablet     Refill:  1   • FLUoxetine (PROzac) 40 MG capsule     Sig: Take 1 capsule by mouth Daily.     Dispense:  30 capsule     Refill:  1       Return in about 6 weeks (around 2/1/2022), or if symptoms worsen or fail to improve, for Next scheduled follow up and Recheck.           Functional Status: Moderate impairment     Prognosis: Fair with Ongoing Treatment             This document has been electronically signed by ARVIN Tamez  December 21, 2021 10:25 EST    Some of the data in this electronic note has been brought forward from a previous encounter, any necessary changes have been made, it has been reviewed by this APRN, and it is accurate.    Please note that portions of this note were completed with a voice recognition program. Efforts were made to edit dictation, but occasionally words are mistranscribed.

## 2022-01-13 DIAGNOSIS — Z30.41 ENCOUNTER FOR SURVEILLANCE OF CONTRACEPTIVE PILLS: ICD-10-CM

## 2022-01-13 RX ORDER — DROSPIRENONE AND ETHINYL ESTRADIOL 0.03MG-3MG
1 KIT ORAL DAILY
Qty: 28 TABLET | Refills: 3 | Status: CANCELLED | OUTPATIENT
Start: 2022-01-13

## 2022-01-14 NOTE — TELEPHONE ENCOUNTER
Rx Refill Note  Requested Prescriptions     Pending Prescriptions Disp Refills   • drospirenone-ethinyl estradiol (TICO,OCELLA) 3-0.03 MG per tablet 28 tablet 3     Sig: Take 1 tablet by mouth Daily.      Last office visit with prescribing clinician: 7/9/2021      Next office visit with prescribing clinician: 1/21/2022     Eunice Chase MA  01/14/22, 07:03 EST     Patient has enough medication to get her to the upcoming scheduled appt.

## 2022-01-22 DIAGNOSIS — F41.9 ANXIETY DISORDER, UNSPECIFIED TYPE: Chronic | ICD-10-CM

## 2022-01-22 DIAGNOSIS — G47.9 SLEEPING DIFFICULTIES: ICD-10-CM

## 2022-01-22 DIAGNOSIS — F33.1 MAJOR DEPRESSIVE DISORDER, RECURRENT EPISODE, MODERATE: Chronic | ICD-10-CM

## 2022-01-24 RX ORDER — FLUOXETINE HYDROCHLORIDE 40 MG/1
40 CAPSULE ORAL DAILY
Qty: 30 CAPSULE | Refills: 0 | Status: SHIPPED | OUTPATIENT
Start: 2022-01-24 | End: 2022-02-01 | Stop reason: SDUPTHER

## 2022-01-24 RX ORDER — HYDROXYZINE HYDROCHLORIDE 25 MG/1
25 TABLET, FILM COATED ORAL NIGHTLY PRN
Qty: 30 TABLET | Refills: 0 | Status: SHIPPED | OUTPATIENT
Start: 2022-01-24 | End: 2022-02-01 | Stop reason: SDUPTHER

## 2022-01-24 RX ORDER — LAMOTRIGINE 25 MG/1
25 TABLET ORAL DAILY
Qty: 30 TABLET | Refills: 0 | Status: SHIPPED | OUTPATIENT
Start: 2022-01-24 | End: 2022-02-01 | Stop reason: SDUPTHER

## 2022-01-31 ENCOUNTER — CLINICAL SUPPORT (OUTPATIENT)
Dept: INTERNAL MEDICINE | Facility: CLINIC | Age: 27
End: 2022-01-31

## 2022-01-31 DIAGNOSIS — E53.8 B12 DEFICIENCY: Chronic | ICD-10-CM

## 2022-01-31 PROCEDURE — 96372 THER/PROPH/DIAG INJ SC/IM: CPT | Performed by: NURSE PRACTITIONER

## 2022-01-31 RX ADMIN — CYANOCOBALAMIN 1000 MCG: 1000 INJECTION, SOLUTION INTRAMUSCULAR; SUBCUTANEOUS at 13:07

## 2022-02-01 ENCOUNTER — TELEMEDICINE (OUTPATIENT)
Dept: PSYCHIATRY | Facility: CLINIC | Age: 27
End: 2022-02-01

## 2022-02-01 DIAGNOSIS — G47.9 SLEEPING DIFFICULTIES: ICD-10-CM

## 2022-02-01 DIAGNOSIS — F41.9 ANXIETY DISORDER, UNSPECIFIED TYPE: Chronic | ICD-10-CM

## 2022-02-01 DIAGNOSIS — F33.1 MAJOR DEPRESSIVE DISORDER, RECURRENT EPISODE, MODERATE: Primary | Chronic | ICD-10-CM

## 2022-02-01 PROCEDURE — 99214 OFFICE O/P EST MOD 30 MIN: CPT | Performed by: NURSE PRACTITIONER

## 2022-02-01 RX ORDER — LAMOTRIGINE 25 MG/1
50 TABLET ORAL DAILY
Qty: 60 TABLET | Refills: 0 | Status: SHIPPED | OUTPATIENT
Start: 2022-02-01 | End: 2022-02-08 | Stop reason: SDUPTHER

## 2022-02-01 RX ORDER — HYDROXYZINE HYDROCHLORIDE 25 MG/1
25 TABLET, FILM COATED ORAL NIGHTLY PRN
Qty: 30 TABLET | Refills: 0 | Status: SHIPPED | OUTPATIENT
Start: 2022-02-01 | End: 2022-02-08 | Stop reason: SDUPTHER

## 2022-02-01 RX ORDER — FLUOXETINE HYDROCHLORIDE 40 MG/1
40 CAPSULE ORAL DAILY
Qty: 30 CAPSULE | Refills: 0 | Status: SHIPPED | OUTPATIENT
Start: 2022-02-01 | End: 2022-02-08 | Stop reason: SDUPTHER

## 2022-02-01 NOTE — PROGRESS NOTES
"This provider is located at the [facility] (through ), [address], [facility], [address] using a secure [employer] Video Visit through [employer]. Patient is being seen remotely via telehealth at their home address in [place], and stated they are in a secure environment for this session. The patient's condition being diagnosed/treated is appropriate for telemedicine. The provider identified herself as well as her credentials.   The patient, and/or patients guardian, consent to be seen remotely, and when consent is given they understand that the consent allows for patient identifiable information to be sent to a third party as needed.   They may refuse to be seen remotely at any time. The electronic data is encrypted and password protected, and the patient and/or guardian has been advised of the potential risks to privacy not withstanding such measures.    You have chosen to receive care through a telehealth visit.  Do you consent to use a video/audio connection for your medical care today? Yes        Subjective   Maddy Chisholm is a 27 y.o. female who presents today for follow up    Chief Complaint: Depression, anxiety, sleeping difficulties, and patient concern for borderline personality disorder    Accompanied by: The patient is alone at today's encounter    History of Present Illness:   The patient describes her mood as okay over the last few weeks.  The patient states her moods are better but \"not all there\".  She states she is still struggling with impulsive behaviors and intrusive thoughts.  She reports she feels like she wants to get hurt, but only on accident.  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  The patient reports she is unable to rate her symptoms of depression and anxiety on a scale of 0-10 with 10 " being the worst today's encounter, but she reports she still feels melancholy at times.  The patient reports she oftentimes experiences a lack of motivation, and she is letting her room get more cluttered which does not help her moods overall.  She reports she is scheduled to start therapy later this week, which she is excited about.  The patient reports she is ready to further investigate her possible diagnoses, and she is worried if she has borderline personality disorder that nobody will want to work with her, but she does report motivation for improvement in her moods and thoughts.  She reports at work when she has intrusive thoughts she can easily distract herself with her work.  She reports she has been working very hard at doing this, and not self harming.  She denies any recent intentional self harming behaviors.  The patient reports her appetite as good.  The patient reports her sleep as good.  The patient reports she has had some bad dreams recently.  The patient denies any new medical problems or changes in medications since last appointment with this facility.  The patient reports compliance with her current medication regimen.  The patient denies any side effects or concerns from her current medication regimen.   The patient would like to adjust her medications at this visit to help with her moods, her lack of motivation and depressive symptoms, and her intrusive thoughts and impulsivity.  The patient denies any suicidal or homicidal ideations, plans, or intent at today's encounter and is convincing.  The patient denies any auditory hallucinations or visual hallucinations.  The patient does not endorse any significant symptoms consistent with oskar or psychosis at today's encounter.        Prior Psychiatric Medications:  Lexapro - decreased her libido so she stopped taking it (can't remember if it helped her mood or not)  Hydroxyzine 25 mg by mouth once daily at bedtime  Prozac  Lamictal      Last  Menstrual Period:  22.  The patient was educated that her prescribed medications can have potential risk to a developing fetus. The patient is advised to contact this APRN/this office if she becomes pregnant or plans to become pregnant.  Pt verbalizes understanding and acknowledged agreement with this plan in her own words.        The following portions of the patient's history were reviewed and updated as appropriate: allergies, current medications, past family history, past medical history, past social history, past surgical history and problem list.            Past Medical History:  Past Medical History:   Diagnosis Date   • Anemia    • Anxiety    • Depression    • Vitamin B12 deficiency        Social History:  Social History     Socioeconomic History   • Marital status: Single   • Number of children: 0   Tobacco Use   • Smoking status: Former Smoker     Packs/day: 0.20     Years: 0.25     Pack years: 0.05     Types: Cigarettes     Start date: 2020     Quit date: 8/15/2020     Years since quittin.4   • Smokeless tobacco: Never Used   • Tobacco comment: smoked for 5 years  (black and milds for 5 years then restarted 2020 and quit 2020   Vaping Use   • Vaping Use: Never used   Substance and Sexual Activity   • Alcohol use: Yes     Comment: Only occasionally, states not frequently   • Drug use: Not Currently     Comment: The patient states in her past she has used marijuanna, but not currently   • Sexual activity: Yes     Partners: Male     Birth control/protection: OCP       Family History:  Family History   Adopted: Yes       Past Surgical History:  Past Surgical History:   Procedure Laterality Date   • NO PAST SURGERIES         Problem List:  Patient Active Problem List   Diagnosis   • Iron deficiency anemia secondary to inadequate dietary iron intake   • B12 deficiency   • Anxiety   • BV (bacterial vaginosis)   • Bronchitis       Allergy:   No Known Allergies     Current Medications:   Current  Outpatient Medications   Medication Sig Dispense Refill   • azithromycin (Zithromax Z-Irving) 250 MG tablet Take 2 tablets by mouth on day 1, then 1 tablet daily on days 2-5 6 tablet 0   • benzonatate (Tessalon Perles) 100 MG capsule Take 1-2 capsules by mouth 3 (Three) Times a Day As Needed for Cough. 30 capsule 0   • drospirenone-ethinyl estradiol (TICO,OCELLA) 3-0.03 MG per tablet Take 1 tablet by mouth Daily. 28 tablet 3   • ferrous gluconate (FERGON) 324 MG tablet Take 1 tablet by mouth 3 (Three) Times a Day With Meals. 90 tablet 0   • FLUoxetine (PROzac) 40 MG capsule Take 1 capsule by mouth Daily. 30 capsule 0   • fluticasone (FLONASE) 50 MCG/ACT nasal spray SPRAY TWO SPRAYS IN EACH NOSTRIL ONCE DAILY AS DIRECTED 16 g 3   • hydrOXYzine (ATARAX) 25 MG tablet Take 1 tablet by mouth At Night As Needed (anxiety and/or sleep). 30 tablet 0   • lamoTRIgine (LaMICtal) 25 MG tablet Take 2 tablets by mouth Daily. 60 tablet 0     Current Facility-Administered Medications   Medication Dose Route Frequency Provider Last Rate Last Admin   • cyanocobalamin injection 1,000 mcg  1,000 mcg Intramuscular Q28 Days Vero Lopez APRN   1,000 mcg at 01/31/22 1307       Review of Symptoms:    Review of Systems   Constitutional: Positive for activity change, appetite change and fatigue. Negative for chills, fever, unexpected weight gain and unexpected weight loss.   HENT: Negative.    Eyes: Negative.    Respiratory: Negative.    Cardiovascular: Negative.    Gastrointestinal: Negative.    Endocrine: Negative.    Genitourinary: Negative.    Musculoskeletal: Negative.    Skin: Negative.    Neurological: Negative.    Psychiatric/Behavioral: Positive for agitation, behavioral problems, decreased concentration, sleep disturbance (improved with medication), depressed mood and stress. Negative for dysphoric mood, hallucinations, self-injury, suicidal ideas and negative for hyperactivity. The patient is nervous/anxious.          Physical  Exam:   not currently breastfeeding. There is no height or weight on file to calculate BMI.   Due to the remote nature of this encounter (virtual encounter), vitals were unable to be obtained.  Height stated at 66.5 inches.  Weight stated around 170 pounds.      Physical Exam  Constitutional:       Appearance: Normal appearance.   Neurological:      Mental Status: She is alert and oriented to person, place, and time.   Psychiatric:         Attention and Perception: Attention normal.         Mood and Affect: Mood and affect normal.         Speech: Speech normal.         Behavior: Behavior normal. Behavior is cooperative.         Thought Content: Thought content normal. Thought content does not include homicidal or suicidal ideation. Thought content does not include homicidal or suicidal plan.         Cognition and Memory: Cognition and memory normal.         Judgment: Judgment normal.           Mental Status Exam:   Hygiene:   good  Cooperation:  Cooperative  Eye Contact:  Good  Psychomotor Behavior:  Appropriate  Affect:  Appropriate  Mood: normal  Hopelessness: Denies  Speech:  Normal  Thought Process:  Linear  Thought Content:  Mood congruent  Suicidal:  None  Homicidal:  None  Hallucinations:  None  Delusion:  None  Memory:  Intact  Orientation:  Person, Place, Time and Situation  Reliability:  good  Insight:  Good  Judgement:  Good  Impulse Control:  Good  Physical/Medical Issues:  No          Lab Results:   No visits with results within 1 Month(s) from this visit.   Latest known visit with results is:   Lab on 12/03/2021   Component Date Value Ref Range Status   • SARS-CoV-2 SHONDA 12/03/2021 Not Detected  Not Detected Final         Assessment/Plan   Problems Addressed this Visit     None      Visit Diagnoses     Major depressive disorder, recurrent episode, moderate (HCC)  (Chronic)   -  Primary    R/O BPD    Relevant Medications    FLUoxetine (PROzac) 40 MG capsule    hydrOXYzine (ATARAX) 25 MG tablet     lamoTRIgine (LaMICtal) 25 MG tablet    Anxiety disorder, unspecified type  (Chronic)       Relevant Medications    FLUoxetine (PROzac) 40 MG capsule    hydrOXYzine (ATARAX) 25 MG tablet    Sleeping difficulties        Relevant Medications    hydrOXYzine (ATARAX) 25 MG tablet      Diagnoses       Codes Comments    Major depressive disorder, recurrent episode, moderate (HCC)    -  Primary ICD-10-CM: F33.1  ICD-9-CM: 296.32 R/O BPD    Anxiety disorder, unspecified type     ICD-10-CM: F41.9  ICD-9-CM: 300.00     Sleeping difficulties     ICD-10-CM: G47.9  ICD-9-CM: 780.50           Visit Diagnoses:    ICD-10-CM ICD-9-CM   1. Major depressive disorder, recurrent episode, moderate (HCC)  F33.1 296.32   2. Anxiety disorder, unspecified type  F41.9 300.00   3. Sleeping difficulties  G47.9 780.50          GOALS:  Short Term Goals: Patient will be compliant with medication, and patient will have no significant medication related side effects.  Patient will be engaged in psychotherapy as indicated.  Patient will report subjective improvement of symptoms.  Long term goals: To stabilize mood and treat/improve subjective symptoms, the patient will stay out of the hospital, the patient will be at an optimal level of functioning, and the patient will take all medications as prescribed.  The patient verbalized understanding and agreement with goals that were mutually set.      TREATMENT PLAN: START supportive psychotherapy efforts and TAKE medications as indicated.  Medication and treatment options, both pharmacological and non-pharmacological treatment options, discussed during today's visit, including any off label use of medication. Patient acknowledged and verbally consented with current treatment plan and was educated on the importance of compliance with treatment and follow-up appointments.      - Continue Prozac 40 mg by mouth once daily for mood.  - Continue hydroxyzine 25 mg by mouth once daily at bedtime as needed for  sleep and/or anxiety.  - Increase lamotrigine to 50 mg by mouth once daily for mood.      MEDICATION ISSUES:  Discussed medication options and treatment plan of prescribed medication, any off label use of medication, as well as the risks, benefits, any black box warnings including increased suicidality, and side effects including but not limited to potential falls, dizziness, possible impaired driving, GI side effects (change in appetite, abdominal discomfort, nausea, vomiting, diarrhea, and/or constipation), dry mouth, somnolence, sedation, insomnia, activation, agitation, irritation, tremors, abnormal muscle movements or disorders, headache, sweating, possible bruising or rare bleeding, electrolyte and/or fluid abnormalities, change in blood pressure/heart rate/and or heart rhythm, sexual dysfunction, and metabolic adversities among others. Patient and/or guardian agreeable to call the office with any worsening of symptoms or onset of side effects, or if any concerns or questions arise.  The contact information for the office is made available to the patient and/or guardian.  Patient and/or guardian agreeable to call 911 or go to the nearest ER should they begin having any SI/HI, or if any urgent concerns arise. No medication side effects or related complaints today.    This APRN has discussed the benefits and risks of taking/continuing Lamictal (Lamotrigine).  The side effects of Lamictal can include a benign rash, blurred or double vision, dizziness, ataxia, sedation, headache, tremor, insomnia, poor coordination, fatigue,  nausea, vomiting, dyspepsia, rhinitis, infection, pharyngitis, asthenia, a rare but serious rash, rare multi-organ failure associated with Santana-Jean Syndrome, toxic epidermal necrolysis, drug hypersensitivity syndrome, rare blood dyscrasias, rare aseptic meningitis, rare sudden unexplained deaths in people with epilepsy, withdrawal seizures upon abrupt withdrawal, and rare activation of  suicidal ideation and behavior (suicidality).  This APRN has discussed that a very slow dose titration when starting, or changing doses, of Lamictal may reduce the incidence of skin rash and other side effects.  The dosage should not be titrated upwards or increased faster than recommended due to the possibility of the discussed side effects and risk of development of a skin rash (which can become life threatening).    This APRN has also discussed that if the patient stops taking the Lamictal for 3-5 days or longer, it will be necessary to restart the drug with an initial dose titration, as rashes have been reported on reexposure.  If the patient and Provider decide to stop the Lamictal, the patient will follow the directions of this APRN/this office as a guided taper over about two weeks is appropriate due to the risk of relapse in bipolar disorder with those with a mood or bipolar disorder, the risk of seizures in those with epilepsy, and discontinuation symptoms upon rapid discontinuation of Lamictal.    The patient verbalizes understanding of benefits and risks as discussed, the patient/guardian feels the benefits outweigh the risks and is agreeable to continue/take Lamictal as discussed.  The patient is advised should any side effects or rash develops they are to stop the Lamictal immediately and contact this APRN/this office or go to the emergency department immediately.  The patient verbalizes understanding and agreement with treatment plan in their own words.      SUICIDE RISK ASSESSMENT AND SAFETY PLAN: Unalterable demographics and a history of mental health intervention indicate this patient is in a high risk category compared to the general population. At present, the patient denies active SI/HI, intentions, or plans at this time and agrees to seek immediate care should such thoughts develop. The patient verbalizes understanding of how to access emergency care if needed and agrees to do so. Consideration  of suicide risk and protective factors such as history, current presentation, individual strengths and weaknesses, psychosocial and environmental stressors and variables, psychiatric illness and symptoms, medical conditions and pain, took place in this interview. Based on those considerations, the patient is determined: within individual baseline and presenting no imminent risk for suicide or homicide. Other recommendations: The patient does not meet the criteria for inpatient admission and is not a safety risk to self or others at today's visit. Inpatient treatment offers no significant advantages over outpatient treatment for this patient at today's visit.  The patient was given ample time for questions and fully participated in treatment planning.  The patient was encouraged to call the clinic with any questions or concerns.  The patient was informed of access to emergency care. If patient were to develop any significant symptomatology, suicidal ideation, homicidal ideation, any concerns, or feel unsafe at any time they are to call the clinic and if unable to get immediate assistance should immediately call 911 or go to the nearest emergency room.  Patient contracted verbally for the following: If you are experiencing an emotional crisis or have thoughts of harming yourself or others, please go to your nearest local emergency room or call 911. Will continue to re-assess medication response and side effects frequently to establish efficacy and ensure safety. Risks, any black box warnings, side effects, off label usage, and benefits of medication and treatment discussed with patient, along with potential adverse side effects of current and/or newly prescribed medication, alternative treatment options, and OTC medications.  Patient verbalized understanding of potential risks, any off label use of medication, any black box warnings, and any side effects in their own words. The patient verbalized understanding and  agreed to comply with the safety plan discussed in their own words.  Patient given the number to the office. Number also discussed of the 24- hour suicide hotline.           MEDS ORDERED DURING VISIT:  New Medications Ordered This Visit   Medications   • FLUoxetine (PROzac) 40 MG capsule     Sig: Take 1 capsule by mouth Daily.     Dispense:  30 capsule     Refill:  0   • hydrOXYzine (ATARAX) 25 MG tablet     Sig: Take 1 tablet by mouth At Night As Needed (anxiety and/or sleep).     Dispense:  30 tablet     Refill:  0   • lamoTRIgine (LaMICtal) 25 MG tablet     Sig: Take 2 tablets by mouth Daily.     Dispense:  60 tablet     Refill:  0       Return in about 4 weeks (around 3/1/2022), or if symptoms worsen or fail to improve, for Next scheduled follow up and Recheck.           Functional Status: Moderate impairment     Prognosis: Fair with Ongoing Treatment             This document has been electronically signed by ARVIN Tamez  February 1, 2022 10:28 EST    Some of the data in this electronic note has been brought forward from a previous encounter, any necessary changes have been made, it has been reviewed by this APRN, and it is accurate.    Please note that portions of this note were completed with a voice recognition program.

## 2022-02-04 ENCOUNTER — TELEMEDICINE (OUTPATIENT)
Dept: PSYCHIATRY | Facility: CLINIC | Age: 27
End: 2022-02-04

## 2022-02-04 DIAGNOSIS — F41.1 GENERALIZED ANXIETY DISORDER: Primary | ICD-10-CM

## 2022-02-04 DIAGNOSIS — F33.1 MODERATE EPISODE OF RECURRENT MAJOR DEPRESSIVE DISORDER: ICD-10-CM

## 2022-02-04 PROCEDURE — 90791 PSYCH DIAGNOSTIC EVALUATION: CPT | Performed by: COUNSELOR
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
## 2022-02-08 DIAGNOSIS — Z30.41 ENCOUNTER FOR SURVEILLANCE OF CONTRACEPTIVE PILLS: ICD-10-CM

## 2022-02-08 DIAGNOSIS — F33.1 MAJOR DEPRESSIVE DISORDER, RECURRENT EPISODE, MODERATE: Chronic | ICD-10-CM

## 2022-02-08 DIAGNOSIS — F41.9 ANXIETY DISORDER, UNSPECIFIED TYPE: Chronic | ICD-10-CM

## 2022-02-08 DIAGNOSIS — G47.9 SLEEPING DIFFICULTIES: ICD-10-CM

## 2022-02-09 RX ORDER — LAMOTRIGINE 25 MG/1
50 TABLET ORAL DAILY
Qty: 60 TABLET | Refills: 0 | Status: SHIPPED | OUTPATIENT
Start: 2022-02-09 | End: 2022-02-17 | Stop reason: SDUPTHER

## 2022-02-09 RX ORDER — DROSPIRENONE AND ETHINYL ESTRADIOL 0.03MG-3MG
1 KIT ORAL DAILY
Qty: 28 TABLET | Refills: 0 | Status: SHIPPED | OUTPATIENT
Start: 2022-02-09 | End: 2022-03-11 | Stop reason: SDUPTHER

## 2022-02-09 RX ORDER — HYDROXYZINE HYDROCHLORIDE 25 MG/1
25 TABLET, FILM COATED ORAL NIGHTLY PRN
Qty: 30 TABLET | Refills: 0 | Status: SHIPPED | OUTPATIENT
Start: 2022-02-09 | End: 2022-02-17 | Stop reason: SDUPTHER

## 2022-02-09 RX ORDER — FLUOXETINE HYDROCHLORIDE 40 MG/1
40 CAPSULE ORAL DAILY
Qty: 30 CAPSULE | Refills: 0 | Status: SHIPPED | OUTPATIENT
Start: 2022-02-09 | End: 2022-02-17 | Stop reason: SDUPTHER

## 2022-02-09 NOTE — TELEPHONE ENCOUNTER
Rx Refill Note  Requested Prescriptions     Pending Prescriptions Disp Refills   • drospirenone-ethinyl estradiol (TICO,OCELLA) 3-0.03 MG per tablet 28 tablet 3     Sig: Take 1 tablet by mouth Daily.      Last office visit with prescribing clinician: 7/9/2021      Next office visit with prescribing clinician: 3/4/2022     Last Fill Date: 11/01/2021  Quantity: 28  Refills: 3    April DIVYA Chase MA  02/09/22, 07:43 EST

## 2022-02-14 ENCOUNTER — OFFICE VISIT (OUTPATIENT)
Dept: INTERNAL MEDICINE | Facility: CLINIC | Age: 27
End: 2022-02-14

## 2022-02-14 ENCOUNTER — LAB (OUTPATIENT)
Dept: LAB | Facility: HOSPITAL | Age: 27
End: 2022-02-14

## 2022-02-14 VITALS
RESPIRATION RATE: 20 BRPM | WEIGHT: 169 LBS | BODY MASS INDEX: 27.16 KG/M2 | OXYGEN SATURATION: 98 % | HEART RATE: 98 BPM | TEMPERATURE: 97.1 F | DIASTOLIC BLOOD PRESSURE: 80 MMHG | HEIGHT: 66 IN | SYSTOLIC BLOOD PRESSURE: 126 MMHG

## 2022-02-14 DIAGNOSIS — J30.9 ALLERGIC RHINITIS, UNSPECIFIED SEASONALITY, UNSPECIFIED TRIGGER: ICD-10-CM

## 2022-02-14 DIAGNOSIS — R19.7 DIARRHEA, UNSPECIFIED TYPE: ICD-10-CM

## 2022-02-14 DIAGNOSIS — R05.9 COUGH: ICD-10-CM

## 2022-02-14 DIAGNOSIS — B34.9 VIRAL INFECTION: Primary | ICD-10-CM

## 2022-02-14 DIAGNOSIS — B34.9 VIRAL INFECTION: ICD-10-CM

## 2022-02-14 LAB — SARS-COV-2 RNA NOSE QL NAA+PROBE: NOT DETECTED

## 2022-02-14 PROCEDURE — 99213 OFFICE O/P EST LOW 20 MIN: CPT | Performed by: PHYSICIAN ASSISTANT

## 2022-02-14 PROCEDURE — U0004 COV-19 TEST NON-CDC HGH THRU: HCPCS | Performed by: PHYSICIAN ASSISTANT

## 2022-02-14 RX ORDER — FLUTICASONE PROPIONATE 50 MCG
2 SPRAY, SUSPENSION (ML) NASAL DAILY
Qty: 16 G | Refills: 3 | Status: SHIPPED | OUTPATIENT
Start: 2022-02-14 | End: 2022-06-20 | Stop reason: SDUPTHER

## 2022-02-14 RX ORDER — CETIRIZINE HYDROCHLORIDE 10 MG/1
10 TABLET ORAL DAILY
Qty: 30 TABLET | Refills: 3 | Status: SHIPPED | OUTPATIENT
Start: 2022-02-14 | End: 2022-08-01

## 2022-02-14 RX ORDER — GUAIFENESIN 600 MG/1
1200 TABLET, EXTENDED RELEASE ORAL 2 TIMES DAILY
Qty: 24 TABLET | Refills: 0 | Status: SHIPPED | OUTPATIENT
Start: 2022-02-14 | End: 2022-08-01

## 2022-02-14 NOTE — PROGRESS NOTES
Chief Complaint  Cough and Diarrhea    Subjective          History of Present Illness  Maddy Chisholm presents to [facility] for   Diarrhea:  Has had diarrhea for the last week that is making her take more breaks at work. She wanted to get it checked out. No vomiting, no stomach pains. It did improve some yesterday and today.    Cough:  She has mucous production and a cough over the last few months. It was not as bad as when she came and saw us in December. Has not gotten worse lately. Does not normally have allergy problems. Has felt wheezy at times here and there. Cough is productive with mucous sometimes, coughs a few times a day.        Review of Systems   Constitutional: Negative for fever and unexpected weight loss.   HENT: Positive for congestion and rhinorrhea. Negative for ear pain, sinus pressure and sore throat.    Respiratory: Positive for cough and wheezing. Negative for shortness of breath.    Cardiovascular: Negative for chest pain and palpitations.   Gastrointestinal: Positive for diarrhea. Negative for abdominal pain, constipation, nausea and vomiting.   Musculoskeletal: Negative for myalgias.   Skin: Negative for rash.   Neurological: Negative for headache.       The following portions of the patient's history were reviewed and updated as appropriate: allergies, current medications, past family history, past medical history, past social history, past surgical history and problem list.  No Known Allergies  Current Outpatient Medications on File Prior to Visit   Medication Sig Dispense Refill   • drospirenone-ethinyl estradiol (TICO,OCELLA) 3-0.03 MG per tablet Take 1 tablet by mouth Daily. 28 tablet 0   • FLUoxetine (PROzac) 40 MG capsule Take 1 capsule by mouth Daily. 30 capsule 0   • hydrOXYzine (ATARAX) 25 MG tablet Take 1 tablet by mouth At Night As Needed (anxiety and/or sleep). 30 tablet 0   • lamoTRIgine (LaMICtal) 25 MG tablet Take 2 tablets by mouth Daily. 60  "tablet 0   • ferrous gluconate (FERGON) 324 MG tablet Take 1 tablet by mouth 3 (Three) Times a Day With Meals. 90 tablet 0   • [DISCONTINUED] azithromycin (Zithromax Z-Irving) 250 MG tablet Take 2 tablets by mouth on day 1, then 1 tablet daily on days 2-5 6 tablet 0   • [DISCONTINUED] benzonatate (Tessalon Perles) 100 MG capsule Take 1-2 capsules by mouth 3 (Three) Times a Day As Needed for Cough. 30 capsule 0   • [DISCONTINUED] fluticasone (FLONASE) 50 MCG/ACT nasal spray SPRAY TWO SPRAYS IN EACH NOSTRIL ONCE DAILY AS DIRECTED 16 g 3     Current Facility-Administered Medications on File Prior to Visit   Medication Dose Route Frequency Provider Last Rate Last Admin   • cyanocobalamin injection 1,000 mcg  1,000 mcg Intramuscular Q28 Days Vero Lopez APRN   1,000 mcg at 22 1307     New Medications Ordered This Visit   Medications   • fluticasone (FLONASE) 50 MCG/ACT nasal spray     Si sprays by Each Nare route Daily.     Dispense:  16 g     Refill:  3   • guaiFENesin (Mucinex) 600 MG 12 hr tablet     Sig: Take 2 tablets by mouth 2 (Two) Times a Day.     Dispense:  24 tablet     Refill:  0   • cetirizine (zyrTEC) 10 MG tablet     Sig: Take 1 tablet by mouth Daily.     Dispense:  30 tablet     Refill:  3     Social History     Tobacco Use   Smoking Status Former Smoker   • Packs/day: 0.20   • Years: 0.25   • Pack years: 0.05   • Types: Cigarettes   • Start date: 2020   • Quit date: 8/15/2020   • Years since quittin.5   Smokeless Tobacco Never Used   Tobacco Comment    smoked for 5 years  (black and milds for 5 years then restarted 2020 and quit 2020        Objective   Vital Signs:   Vitals:    22 0932   BP: 126/80   Pulse: 98   Resp: 20   Temp: 97.1 °F (36.2 °C)   TempSrc: Temporal   SpO2: 98%   Weight: 76.7 kg (169 lb)   Height: 167.9 cm (66.1\")   PainSc: 0-No pain      Physical Exam  Vitals reviewed.   Constitutional:       General: She is not in acute distress.     Appearance: Normal " appearance.   HENT:      Head: Normocephalic and atraumatic.      Right Ear: Tympanic membrane, ear canal and external ear normal.      Left Ear: Tympanic membrane, ear canal and external ear normal.      Nose: Nose normal.      Mouth/Throat:      Mouth: Mucous membranes are moist.      Pharynx: No oropharyngeal exudate or posterior oropharyngeal erythema.   Eyes:      General: No scleral icterus.     Extraocular Movements: Extraocular movements intact.      Conjunctiva/sclera: Conjunctivae normal.   Cardiovascular:      Rate and Rhythm: Normal rate and regular rhythm.      Heart sounds: Normal heart sounds. No murmur heard.      Pulmonary:      Effort: Pulmonary effort is normal. No respiratory distress.      Breath sounds: Normal breath sounds. No stridor. No wheezing or rhonchi.   Musculoskeletal:      Cervical back: Normal range of motion and neck supple.   Skin:     General: Skin is warm and dry.      Coloration: Skin is not jaundiced.   Neurological:      General: No focal deficit present.      Mental Status: She is alert and oriented to person, place, and time.      Gait: Gait normal.   Psychiatric:         Mood and Affect: Mood normal.         Behavior: Behavior normal.        No LMP recorded.    Result Review :                   Assessment and Plan    Diagnoses and all orders for this visit:    1. Viral infection (Primary)  Assessment & Plan:  Supportive care. Stay hydrated, bland diet. Check for covid    Orders:  -     COVID-19 PCR, [facility], NP SWAB IN [facility] VIRAL TRANSPORT MEDIA/ORAL SWISH 24-30 HR TAT - Swab, Nasopharynx; Future    2. Allergic rhinitis, unspecified seasonality, unspecified trigger  Assessment & Plan:  Treat with zyrtec and flonase. mucinex prn. F/u if sx persist    Orders:  -     fluticasone (FLONASE) 50 MCG/ACT nasal spray; 2 sprays by Each Nare route Daily.  Dispense: 16 g; Refill: 3  -     guaiFENesin (Mucinex) 600 MG 12 hr tablet; Take 2 tablets by mouth 2 (Two) Times a Day.   Dispense: 24 tablet; Refill: 0  -     cetirizine (zyrTEC) 10 MG tablet; Take 1 tablet by mouth Daily.  Dispense: 30 tablet; Refill: 3    3. Cough  -     COVID-19 PCR, [facility], NP SWAB IN [facility] VIRAL TRANSPORT MEDIA/ORAL SWISH 24-30 HR TAT - Swab, Nasopharynx; Future    4. Diarrhea, unspecified type  -     COVID-19 PCR, [facility], NP SWAB IN [facility] V[facility]AL TRANSPORT MEDIA/ORAL SWISH 24-30 HR TAT - Swab, Nasopharynx; Future      Follow Up   Return if symptoms worsen or fail to improve.    Follow up if symptoms worsen or persist or has new or concerning symptoms, go to ER for severe symptoms.   Reviewed common medication effects and side effects and advised to report side effects immediately.  Encouraged medication compliance and the importance of keeping scheduled follow up appointments with me and any other providers.  If a referral was made please contact our office if you have not heard about an appointment in the next 2 weeks.   If labs or images are ordered we will contact you with the results within the next week.  If you have not heard from us after a week please call our office to inquire about the results.   Patient was given instructions and counseling regarding her condition or for health maintenance advice. Please see specific information pulled into the AVS if appropriate.     Ana Jin PA-C    * Please note that portions of this note were completed with a voice recognition program.

## 2022-02-15 ENCOUNTER — TELEPHONE (OUTPATIENT)
Dept: INTERNAL MEDICINE | Facility: CLINIC | Age: 27
End: 2022-02-15

## 2022-02-17 DIAGNOSIS — F41.9 ANXIETY DISORDER, UNSPECIFIED TYPE: Chronic | ICD-10-CM

## 2022-02-17 DIAGNOSIS — G47.9 SLEEPING DIFFICULTIES: ICD-10-CM

## 2022-02-17 DIAGNOSIS — F33.1 MAJOR DEPRESSIVE DISORDER, RECURRENT EPISODE, MODERATE: Chronic | ICD-10-CM

## 2022-02-17 RX ORDER — HYDROXYZINE HYDROCHLORIDE 25 MG/1
25 TABLET, FILM COATED ORAL NIGHTLY PRN
Qty: 30 TABLET | Refills: 0 | Status: SHIPPED | OUTPATIENT
Start: 2022-02-17 | End: 2022-03-09 | Stop reason: SDUPTHER

## 2022-02-17 RX ORDER — LAMOTRIGINE 25 MG/1
50 TABLET ORAL DAILY
Qty: 60 TABLET | Refills: 0 | Status: SHIPPED | OUTPATIENT
Start: 2022-02-17 | End: 2022-03-19 | Stop reason: SDUPTHER

## 2022-02-17 RX ORDER — FLUOXETINE HYDROCHLORIDE 40 MG/1
40 CAPSULE ORAL DAILY
Qty: 30 CAPSULE | Refills: 0 | Status: SHIPPED | OUTPATIENT
Start: 2022-02-17 | End: 2022-04-20 | Stop reason: SDUPTHER

## 2022-03-04 ENCOUNTER — LAB (OUTPATIENT)
Dept: LAB | Facility: HOSPITAL | Age: 27
End: 2022-03-04

## 2022-03-04 ENCOUNTER — OFFICE VISIT (OUTPATIENT)
Dept: INTERNAL MEDICINE | Facility: CLINIC | Age: 27
End: 2022-03-04

## 2022-03-04 VITALS
HEART RATE: 106 BPM | OXYGEN SATURATION: 98 % | WEIGHT: 173.8 LBS | HEIGHT: 66 IN | BODY MASS INDEX: 27.93 KG/M2 | TEMPERATURE: 97.3 F | SYSTOLIC BLOOD PRESSURE: 106 MMHG | DIASTOLIC BLOOD PRESSURE: 84 MMHG

## 2022-03-04 DIAGNOSIS — D50.8 IRON DEFICIENCY ANEMIA SECONDARY TO INADEQUATE DIETARY IRON INTAKE: ICD-10-CM

## 2022-03-04 DIAGNOSIS — Z11.3 SCREEN FOR STD (SEXUALLY TRANSMITTED DISEASE): ICD-10-CM

## 2022-03-04 DIAGNOSIS — E53.8 B12 DEFICIENCY: ICD-10-CM

## 2022-03-04 DIAGNOSIS — N89.8 VAGINAL DISCHARGE: ICD-10-CM

## 2022-03-04 DIAGNOSIS — Z20.822 ENCOUNTER FOR PREPROCEDURE SCREENING LABORATORY TESTING FOR COVID-19: ICD-10-CM

## 2022-03-04 DIAGNOSIS — R06.02 SHORTNESS OF BREATH: ICD-10-CM

## 2022-03-04 DIAGNOSIS — R06.2 WHEEZING: ICD-10-CM

## 2022-03-04 DIAGNOSIS — L80 VITILIGO: ICD-10-CM

## 2022-03-04 DIAGNOSIS — Z00.00 ANNUAL PHYSICAL EXAM: Primary | ICD-10-CM

## 2022-03-04 DIAGNOSIS — Z00.00 ANNUAL PHYSICAL EXAM: ICD-10-CM

## 2022-03-04 DIAGNOSIS — Z01.812 ENCOUNTER FOR PREPROCEDURE SCREENING LABORATORY TESTING FOR COVID-19: ICD-10-CM

## 2022-03-04 LAB
BASOPHILS # BLD AUTO: 0.02 10*3/MM3 (ref 0–0.2)
BASOPHILS NFR BLD AUTO: 0.3 % (ref 0–1.5)
DEPRECATED RDW RBC AUTO: 38.5 FL (ref 37–54)
EOSINOPHIL # BLD AUTO: 0.01 10*3/MM3 (ref 0–0.4)
EOSINOPHIL NFR BLD AUTO: 0.2 % (ref 0.3–6.2)
ERYTHROCYTE [DISTWIDTH] IN BLOOD BY AUTOMATED COUNT: 14.5 % (ref 12.3–15.4)
HAV IGM SERPL QL IA: NORMAL
HBV CORE IGM SERPL QL IA: NORMAL
HBV SURFACE AG SERPL QL IA: NORMAL
HCT VFR BLD AUTO: 38.9 % (ref 34–46.6)
HCV AB SER DONR QL: NORMAL
HGB BLD-MCNC: 12.4 G/DL (ref 12–15.9)
HIV1+2 AB SER QL: NORMAL
IMM GRANULOCYTES # BLD AUTO: 0.01 10*3/MM3 (ref 0–0.05)
IMM GRANULOCYTES NFR BLD AUTO: 0.2 % (ref 0–0.5)
LYMPHOCYTES # BLD AUTO: 1.51 10*3/MM3 (ref 0.7–3.1)
LYMPHOCYTES NFR BLD AUTO: 22.8 % (ref 19.6–45.3)
MCH RBC QN AUTO: 23.9 PG (ref 26.6–33)
MCHC RBC AUTO-ENTMCNC: 31.9 G/DL (ref 31.5–35.7)
MCV RBC AUTO: 75 FL (ref 79–97)
MONOCYTES # BLD AUTO: 0.43 10*3/MM3 (ref 0.1–0.9)
MONOCYTES NFR BLD AUTO: 6.5 % (ref 5–12)
NEUTROPHILS NFR BLD AUTO: 4.63 10*3/MM3 (ref 1.7–7)
NEUTROPHILS NFR BLD AUTO: 70 % (ref 42.7–76)
NRBC BLD AUTO-RTO: 0.2 /100 WBC (ref 0–0.2)
PLATELET # BLD AUTO: 314 10*3/MM3 (ref 140–450)
PMV BLD AUTO: 10.4 FL (ref 6–12)
RBC # BLD AUTO: 5.19 10*6/MM3 (ref 3.77–5.28)
WBC NRBC COR # BLD: 6.61 10*3/MM3 (ref 3.4–10.8)

## 2022-03-04 PROCEDURE — 82607 VITAMIN B-12: CPT | Performed by: NURSE PRACTITIONER

## 2022-03-04 PROCEDURE — 85025 COMPLETE CBC W/AUTO DIFF WBC: CPT | Performed by: NURSE PRACTITIONER

## 2022-03-04 PROCEDURE — 99214 OFFICE O/P EST MOD 30 MIN: CPT | Performed by: NURSE PRACTITIONER

## 2022-03-04 PROCEDURE — 87798 DETECT AGENT NOS DNA AMP: CPT | Performed by: NURSE PRACTITIONER

## 2022-03-04 PROCEDURE — 96372 THER/PROPH/DIAG INJ SC/IM: CPT | Performed by: NURSE PRACTITIONER

## 2022-03-04 PROCEDURE — 86696 HERPES SIMPLEX TYPE 2 TEST: CPT | Performed by: NURSE PRACTITIONER

## 2022-03-04 PROCEDURE — 99395 PREV VISIT EST AGE 18-39: CPT | Performed by: NURSE PRACTITIONER

## 2022-03-04 PROCEDURE — 86695 HERPES SIMPLEX TYPE 1 TEST: CPT | Performed by: NURSE PRACTITIONER

## 2022-03-04 PROCEDURE — 86592 SYPHILIS TEST NON-TREP QUAL: CPT | Performed by: NURSE PRACTITIONER

## 2022-03-04 PROCEDURE — 87491 CHLMYD TRACH DNA AMP PROBE: CPT | Performed by: NURSE PRACTITIONER

## 2022-03-04 PROCEDURE — 80053 COMPREHEN METABOLIC PANEL: CPT | Performed by: NURSE PRACTITIONER

## 2022-03-04 PROCEDURE — G0432 EIA HIV-1/HIV-2 SCREEN: HCPCS | Performed by: NURSE PRACTITIONER

## 2022-03-04 PROCEDURE — 87801 DETECT AGNT MULT DNA AMPLI: CPT | Performed by: NURSE PRACTITIONER

## 2022-03-04 PROCEDURE — 87591 N.GONORRHOEAE DNA AMP PROB: CPT | Performed by: NURSE PRACTITIONER

## 2022-03-04 PROCEDURE — 87661 TRICHOMONAS VAGINALIS AMPLIF: CPT | Performed by: NURSE PRACTITIONER

## 2022-03-04 PROCEDURE — 80074 ACUTE HEPATITIS PANEL: CPT | Performed by: NURSE PRACTITIONER

## 2022-03-04 PROCEDURE — 82728 ASSAY OF FERRITIN: CPT | Performed by: NURSE PRACTITIONER

## 2022-03-04 RX ORDER — CYANOCOBALAMIN 1000 UG/ML
1000 INJECTION, SOLUTION INTRAMUSCULAR; SUBCUTANEOUS ONCE
Status: COMPLETED | OUTPATIENT
Start: 2022-03-04 | End: 2022-03-04

## 2022-03-04 RX ORDER — CYANOCOBALAMIN 1000 UG/ML
1000 INJECTION, SOLUTION INTRAMUSCULAR; SUBCUTANEOUS ONCE
Status: DISCONTINUED | OUTPATIENT
Start: 2022-03-04 | End: 2022-03-04

## 2022-03-04 RX ADMIN — CYANOCOBALAMIN 1000 MCG: 1000 INJECTION, SOLUTION INTRAMUSCULAR; SUBCUTANEOUS at 15:08

## 2022-03-04 NOTE — PROGRESS NOTES
Patient Care Team:  Vero Lopez APRN as PCP - General (Nurse Practitioner)  Cristina Mena APRN as Nurse Practitioner (Psychiatry)     Chief complaint: Patient is in today for a physical          Patient is a 27 y.o. female who presents for her yearly physical exam.     HPI      Anxiety/difficulty controlling anger-referred to psychiatry at her last visit in July 2021.  She has been seeing ARVIN Torres.  She is treating for her anxiety, depression, and sleep disturbance.      b12 def-   last injection was last month. She would like to have injection while here  Iron deficient- due for recheck lab     Hemorrhoid- has been present for years.     Has a little trouble retaining stool. Has a bit of leakage    has not bee using preparation H.       Vitiligo-  Skin doscoloration on faceDoes not use sunscreen     Right Wrist/thumb pain -    fell on her wrist about a month ago. Pain is improving    C/o rib pain-   Has pain with a deep breath or when she is lying on her side.   She has pain in her chest and her left back to take  Breath in.   Sometimes when she takes a deep breath has an extrea small breath in   Occasionally feels wheezy in her chest.       Health maintenance/lifestyle:  Health Maintenance   Topic Date Due   • INFLUENZA VACCINE  08/01/2021   • COVID-19 Vaccine (3 - Booster for Pfizer series) 11/09/2021   • ANNUAL PHYSICAL  01/19/2022   • PAP SMEAR  01/18/2024   • TDAP/TD VACCINES (2 - Td or Tdap) 01/18/2031   • HEPATITIS C SCREENING  Completed   • Pneumococcal Vaccine 0-64  Aged Out       Immunization History   Administered Date(s) Administered   • COVID-19 (PFIZER) PURPLE CAP 05/19/2021, 06/09/2021   • FluLaval/Fluarix/Fluzone >6 09/18/2020   • Tdap 01/18/2021       Covid vaccine: Due for booster, counseled  Influenza: Due, counseled  Tetanus: Up-to-date, due in 2031    Hep C screening: Due, denies high risk behaviors      Cancer-related family history is not on file. She was  adopted.  Mammogram:  Will start screening at age 40     reports being sexually active and has had partner(s) who are male. She reports using the following method of birth control/protection: OCP.  STI concerns: she would like screening. Denies symptoms    Pap: due   Last Completed Pap Smear          PAP SMEAR (Every 3 Years) Next due on 2021  SCANNED - PAP SMEAR              Contraception: pierce's  Menses:    Patient's last menstrual period was 2022.      Diet: Healthy  Exercise: Occasional    Social History     Tobacco Use   Smoking Status Former Smoker   • Packs/day: 0.20   • Years: 0.25   • Pack years: 0.05   • Types: Cigarettes   • Start date: 2020   • Quit date: 8/15/2020   • Years since quittin.5   Smokeless Tobacco Never Used   Tobacco Comment    smoked for 5 years  (black and milds for 5 years then restarted 2020 and quit 2020     Social History     Substance and Sexual Activity   Alcohol Use Yes    Comment: Only occasionally, states not frequently       PHQ-2 Depression Screening  Little interest or pleasure in doing things? 2   Feeling down, depressed, or hopeless? 2   PHQ-2 Total Score 19       Review of Systems   Constitutional: Negative for fatigue, fever and unexpected weight loss.   Eyes: Negative for blurred vision, double vision, pain and visual disturbance.   Respiratory: Positive for cough, shortness of breath and wheezing. Negative for chest tightness.    Cardiovascular: Negative for chest pain, palpitations and leg swelling.   Gastrointestinal: Positive for blood in stool, constipation and diarrhea. Negative for abdominal pain, nausea and vomiting.   Genitourinary: Positive for vaginal discharge. Negative for difficulty urinating, frequency and urgency.   Musculoskeletal: Negative for arthralgias and myalgias.   Skin: Negative for color change and rash.   Neurological: Negative for dizziness, weakness, light-headedness, headache and confusion.    Hematological: Negative for adenopathy. Does not bruise/bleed easily.   Psychiatric/Behavioral: Positive for agitation, decreased concentration, sleep disturbance, depressed mood and stress. Negative for self-injury and suicidal ideas. The patient is nervous/anxious.          History  Social History     Socioeconomic History   • Marital status: Single   • Number of children: 0   Tobacco Use   • Smoking status: Former Smoker     Packs/day: 0.20     Years: 0.25     Pack years: 0.05     Types: Cigarettes     Start date: 2020     Quit date: 8/15/2020     Years since quittin.5   • Smokeless tobacco: Never Used   • Tobacco comment: smoked for 5 years  (black and milds for 5 years then restarted 2020 and quit 2020   Vaping Use   • Vaping Use: Never used   Substance and Sexual Activity   • Alcohol use: Yes     Comment: Only occasionally, states not frequently   • Drug use: Not Currently     Comment: The patient states in her past she has used marijuanna, but not currently   • Sexual activity: Yes     Partners: Male     Birth control/protection: OCP     Past Medical History:   Diagnosis Date   • Anemia    • Anxiety    • Depression    • Vitamin B12 deficiency       Past Surgical History:   Procedure Laterality Date   • NO PAST SURGERIES        No Known Allergies   Family History   Adopted: Yes       Current Outpatient Medications:   •  cetirizine (zyrTEC) 10 MG tablet, Take 1 tablet by mouth Daily., Disp: 30 tablet, Rfl: 3  •  drospirenone-ethinyl estradiol (TICO,OCELLA) 3-0.03 MG per tablet, Take 1 tablet by mouth Daily., Disp: 28 tablet, Rfl: 0  •  FLUoxetine (PROzac) 40 MG capsule, Take 1 capsule by mouth Daily., Disp: 30 capsule, Rfl: 0  •  fluticasone (FLONASE) 50 MCG/ACT nasal spray, 2 sprays by Each Nare route Daily., Disp: 16 g, Rfl: 3  •  guaiFENesin (Mucinex) 600 MG 12 hr tablet, Take 2 tablets by mouth 2 (Two) Times a Day., Disp: 24 tablet, Rfl: 0  •  lamoTRIgine (LaMICtal) 25 MG tablet, Take 2  "tablets by mouth Daily., Disp: 60 tablet, Rfl: 0  •  ferrous gluconate (FERGON) 324 MG tablet, Take 1 tablet by mouth 3 (Three) Times a Day With Meals., Disp: 90 tablet, Rfl: 0  •  hydrOXYzine (ATARAX) 25 MG tablet, Take 1 tablet by mouth At Night As Needed (anxiety and/or sleep)., Disp: 30 tablet, Rfl: 0                  /84 (BP Location: Right arm, Patient Position: Sitting, Cuff Size: Adult)   Pulse 106   Temp 97.3 °F (36.3 °C) (Infrared)   Ht 167.9 cm (66.1\")   Wt 78.8 kg (173 lb 12.8 oz)   LMP 02/21/2022   SpO2 98%   Breastfeeding No   BMI 27.97 kg/m²       Physical Exam  Vitals and nursing note reviewed. Exam conducted with a chaperone present.   Constitutional:       General: She is not in acute distress.     Appearance: Normal appearance. She is well-developed. She is not diaphoretic.   HENT:      Head: Normocephalic and atraumatic.      Right Ear: External ear normal.      Left Ear: External ear normal.      Mouth/Throat:      Pharynx: No oropharyngeal exudate.   Eyes:      General: No scleral icterus.        Right eye: No discharge.         Left eye: No discharge.      Conjunctiva/sclera: Conjunctivae normal.   Neck:      Thyroid: No thyromegaly.      Vascular: No JVD.      Trachea: No tracheal deviation.   Cardiovascular:      Rate and Rhythm: Normal rate and regular rhythm.      Pulses:           Dorsalis pedis pulses are 2+ on the right side and 2+ on the left side.        Posterior tibial pulses are 2+ on the right side and 2+ on the left side.      Heart sounds: Normal heart sounds. No murmur heard.    No friction rub.   Pulmonary:      Effort: Pulmonary effort is normal. No respiratory distress.      Breath sounds: Normal breath sounds. No wheezing or rales.   Chest:      Chest wall: No tenderness.   Abdominal:      General: Bowel sounds are normal. There is no distension.      Palpations: Abdomen is soft. There is no mass.      Tenderness: There is no abdominal tenderness.      Hernia: " No hernia is present.   Genitourinary:     Vagina: Normal.      Cervix: Normal.      Uterus: Normal.       Adnexa: Right adnexa normal and left adnexa normal.      Rectum: Normal.   Musculoskeletal:         General: No tenderness or deformity. Normal range of motion.      Cervical back: Normal range of motion and neck supple.   Lymphadenopathy:      Cervical: No cervical adenopathy.   Skin:     General: Skin is warm and dry.      Coloration: Skin is not pale.      Findings: No erythema or rash.   Neurological:      Mental Status: She is alert and oriented to person, place, and time.      Coordination: Coordination normal.      Deep Tendon Reflexes: Reflexes are normal and symmetric. Reflexes normal.   Psychiatric:         Behavior: Behavior normal.         Thought Content: Thought content normal.         Judgment: Judgment normal.                   Diagnoses and all orders for this visit:    1. Annual physical exam (Primary)  -     CBC & Differential; Future  -     Comprehensive Metabolic Panel; Future    2. B12 deficiency  -     Vitamin B12; Future  -     Discontinue: cyanocobalamin injection 1,000 mcg  -     cyanocobalamin injection 1,000 mcg  We will check a B12 level today and then have her come back down for a B12 injection after labs collected  3. Iron deficiency anemia secondary to inadequate dietary iron intake  -     Ferritin; Future  Recheck ferritin.  May need to start back on iron supplement  4. Screen for STD (sexually transmitted disease)  -     NuSwab VG+ - Swab, Vagina; Future  -     Hepatitis Panel, Acute; Future  -     HIV-1 / O / 2 Ag / Antibody 4th Generation; Future  -     HSV 1 & 2 - Specific Antibody, IgG; Future  -     RPR; Future    5. Vaginal discharge  -     NuSwab VG+ - Swab, Vagina; Future  We will check a nu swab  6. Wheezing  -     Full Pulmonary Function Test With Bronchodilator; Future  -     XR Chest PA & Lateral; Future    7. Shortness of breath  -     Full Pulmonary Function Test  With Bronchodilator; Future  -     XR Chest PA & Lateral; Future  #6/7-we will evaluate pulmonary function testing with bronchodilator and that asthma may be some component of her ongoing wheezing or shortness of breath.  8. Encounter for preprocedure screening laboratory testing for COVID-19  -     COVID PRE-OP / PRE-PROCEDURE SCREENING ORDER (NO ISOLATION) - Swab, Nasopharynx; Future  Covid test ordered that she should complete before completing her pulmonary function testing  9.  Vitiligo-  Encouraged sunscreen use   Labs  ordered as above- will notify of results and treat accordingly. If patient has not received results within one week via mychart or letter, they will notify my office  Immunizations and screenings:  preventative/screenings are up-to-date/addressed as noted in the HPI.  Counseling: The patient is advised to begin progressive daily aerobic exercise program, improve dietary compliance, continue current medications and return for routine annual checkups  Follow up: Return for FU after PFT's, and need to collect labs today.  Plan of care discussed with pt. They verbalized understanding and agreement.     ARVIN Valle            Dictated Utilizing Dragon Dictation   Please note that portions of this note were completed with a voice recognition program.   Part of this note may be an electronic transcription/translation of spoken language to printed text using the Dragon Dictation System.

## 2022-03-05 LAB
ALBUMIN SERPL-MCNC: 4.4 G/DL (ref 3.5–5.2)
ALBUMIN/GLOB SERPL: 1.3 G/DL
ALP SERPL-CCNC: 89 U/L (ref 39–117)
ALT SERPL W P-5'-P-CCNC: 7 U/L (ref 1–33)
ANION GAP SERPL CALCULATED.3IONS-SCNC: 15.9 MMOL/L (ref 5–15)
AST SERPL-CCNC: 17 U/L (ref 1–32)
BILIRUB SERPL-MCNC: 0.2 MG/DL (ref 0–1.2)
BUN SERPL-MCNC: 12 MG/DL (ref 6–20)
BUN/CREAT SERPL: 13.2 (ref 7–25)
CALCIUM SPEC-SCNC: 10.2 MG/DL (ref 8.6–10.5)
CHLORIDE SERPL-SCNC: 99 MMOL/L (ref 98–107)
CO2 SERPL-SCNC: 21.1 MMOL/L (ref 22–29)
CREAT SERPL-MCNC: 0.91 MG/DL (ref 0.57–1)
EGFRCR SERPLBLD CKD-EPI 2021: 88.9 ML/MIN/1.73
FERRITIN SERPL-MCNC: 17.9 NG/ML (ref 13–150)
GLOBULIN UR ELPH-MCNC: 3.5 GM/DL
GLUCOSE SERPL-MCNC: 83 MG/DL (ref 65–99)
POTASSIUM SERPL-SCNC: 4.2 MMOL/L (ref 3.5–5.2)
PROT SERPL-MCNC: 7.9 G/DL (ref 6–8.5)
RPR SER QL: NORMAL
SODIUM SERPL-SCNC: 136 MMOL/L (ref 136–145)
VIT B12 BLD-MCNC: 458 PG/ML (ref 211–946)

## 2022-03-06 LAB
HSV1 IGG SER IA-ACNC: <0.91 INDEX (ref 0–0.9)
HSV2 IGG SER IA-ACNC: <0.91 INDEX (ref 0–0.9)

## 2022-03-07 LAB
A VAGINAE DNA VAG QL NAA+PROBE: NORMAL SCORE
BVAB2 DNA VAG QL NAA+PROBE: NORMAL SCORE
C ALBICANS DNA VAG QL NAA+PROBE: NEGATIVE
C GLABRATA DNA VAG QL NAA+PROBE: NEGATIVE
C TRACH DNA VAG QL NAA+PROBE: NEGATIVE
MEGA1 DNA VAG QL NAA+PROBE: NORMAL SCORE
N GONORRHOEA DNA VAG QL NAA+PROBE: NEGATIVE
T VAGINALIS DNA VAG QL NAA+PROBE: NEGATIVE

## 2022-03-09 DIAGNOSIS — G47.9 SLEEPING DIFFICULTIES: ICD-10-CM

## 2022-03-09 DIAGNOSIS — F41.9 ANXIETY DISORDER, UNSPECIFIED TYPE: Chronic | ICD-10-CM

## 2022-03-09 RX ORDER — HYDROXYZINE HYDROCHLORIDE 25 MG/1
25 TABLET, FILM COATED ORAL NIGHTLY PRN
Qty: 30 TABLET | Refills: 0 | Status: SHIPPED | OUTPATIENT
Start: 2022-03-09 | End: 2022-04-20 | Stop reason: SDUPTHER

## 2022-03-11 DIAGNOSIS — Z30.41 ENCOUNTER FOR SURVEILLANCE OF CONTRACEPTIVE PILLS: ICD-10-CM

## 2022-03-13 PROBLEM — R06.2 WHEEZING: Status: ACTIVE | Noted: 2022-03-13

## 2022-03-13 PROBLEM — L80 VITILIGO: Status: ACTIVE | Noted: 2022-03-13

## 2022-03-14 ENCOUNTER — TELEPHONE (OUTPATIENT)
Dept: INTERNAL MEDICINE | Facility: CLINIC | Age: 27
End: 2022-03-14

## 2022-03-14 RX ORDER — DROSPIRENONE AND ETHINYL ESTRADIOL 0.03MG-3MG
1 KIT ORAL DAILY
Qty: 28 TABLET | Refills: 3 | Status: SHIPPED | OUTPATIENT
Start: 2022-03-14 | End: 2022-06-20 | Stop reason: SDUPTHER

## 2022-03-14 NOTE — TELEPHONE ENCOUNTER
S/W to ninform her of labs. She states she will take OTC B12 and Iron. She verbalized good understanding and appreciation.

## 2022-03-14 NOTE — TELEPHONE ENCOUNTER
Hub staff attempted to follow warm transfer process and was unsuccessful     Caller: Maddy Chisholm    Relationship to patient: Self    Best call back number: [phone]    Patient is needing: PATIENT WAS RETURNING PHONE CALL TO TIMOTHY

## 2022-03-14 NOTE — TELEPHONE ENCOUNTER
----- Message from ARVIN Hassan sent at 3/13/2022 10:09 PM EDT -----  Please let her know her labs show low ferritin so I rec she take iron supplement once a day    B12 is low-mid normal. We can continue b12 injections monthly or she can switch over to b12 1000mcg daily OTC. Please let me know which she would like to do.       Her other labs were ok

## 2022-03-15 ENCOUNTER — HOSPITAL ENCOUNTER (OUTPATIENT)
Dept: PULMONOLOGY | Facility: HOSPITAL | Age: 27
Discharge: HOME | End: 2022-03-15
Admitting: NURSE PRACTITIONER

## 2022-03-15 DIAGNOSIS — R06.02 SHORTNESS OF BREATH: ICD-10-CM

## 2022-03-15 DIAGNOSIS — R06.2 WHEEZING: ICD-10-CM

## 2022-03-15 PROCEDURE — 94010 BREATHING CAPACITY TEST: CPT | Performed by: INTERNAL MEDICINE

## 2022-03-15 PROCEDURE — 94729 DIFFUSING CAPACITY: CPT | Performed by: INTERNAL MEDICINE

## 2022-03-15 PROCEDURE — 94729 DIFFUSING CAPACITY: CPT

## 2022-03-15 PROCEDURE — 94727 GAS DIL/WSHOT DETER LNG VOL: CPT | Performed by: INTERNAL MEDICINE

## 2022-03-15 PROCEDURE — 94727 GAS DIL/WSHOT DETER LNG VOL: CPT

## 2022-03-15 PROCEDURE — 94010 BREATHING CAPACITY TEST: CPT

## 2022-03-19 DIAGNOSIS — F33.1 MAJOR DEPRESSIVE DISORDER, RECURRENT EPISODE, MODERATE: Chronic | ICD-10-CM

## 2022-03-20 RX ORDER — LAMOTRIGINE 25 MG/1
50 TABLET ORAL DAILY
Qty: 60 TABLET | Refills: 0 | Status: SHIPPED | OUTPATIENT
Start: 2022-03-20 | End: 2022-04-20 | Stop reason: SDUPTHER

## 2022-03-23 ENCOUNTER — HOSPITAL ENCOUNTER (OUTPATIENT)
Dept: GENERAL RADIOLOGY | Facility: HOSPITAL | Age: 27
Discharge: HOME | End: 2022-03-23
Admitting: NURSE PRACTITIONER

## 2022-03-23 DIAGNOSIS — R06.02 SHORTNESS OF BREATH: ICD-10-CM

## 2022-03-23 DIAGNOSIS — R06.2 WHEEZING: ICD-10-CM

## 2022-03-23 PROCEDURE — 71046 X-RAY EXAM CHEST 2 VIEWS: CPT

## 2022-04-07 ENCOUNTER — TELEMEDICINE (OUTPATIENT)
Dept: PSYCHIATRY | Facility: CLINIC | Age: 27
End: 2022-04-07

## 2022-04-07 DIAGNOSIS — F41.1 GENERALIZED ANXIETY DISORDER: Primary | ICD-10-CM

## 2022-04-07 PROCEDURE — 90834 PSYTX W PT 45 MINUTES: CPT | Performed by: COUNSELOR

## 2022-04-07 NOTE — PROGRESS NOTES
Date: April 7, 2022  Time In: 10:05 AM   Time Out: 10:52 AM  This provider is located at the [facility]), [address], [place] [address] using a secure [employer] Video Visit through [employer]. Patient is being seen remotely via telehealth at home address in [place] and stated they are in a secure environment for this session. The patient's condition being diagnosed/treated is appropriate for telemedicine. The provider identified herself as well as her credentials. The patient, and/or patients guardian, consent to be seen remotely, and when consent is given they understand that the consent allows for patient identifiable information to be sent to a third party as needed. They may refuse to be seen remotely at any time. The electronic data is encrypted and password protected, and the patient and/or guardian has been advised of the potential risks to privacy not withstanding such measures.     You have chosen to receive care through a telehealth visit.  Do you consent to use a video/audio connection for your medical care today? Yes    PROGRESS NOTE  Data:  Maddy Chisholm is a 27 y.o. female who presents today for follow up and care planning. Patient states that she quit her job since her initial evaluation and is not working at this time. Patient states that she has been exercising more and is trying to be healthier.  Patient feels that due to her anger and anxiety that she has experienced social regression. Patient also wants to work on learning to control her feelings of anger, anxiety and depression and  Increasing her self-esteem and improve her coping skills. Patient states that she often feels disliked and feels that she has not met minimum milestones for others of her age and states that she used to feel that she was pretty and feels that she is aging and can lose that and is concerned where she will be in the future. Patient states that  she also struggles with lack of motivation and admits that she feels that she has an addiction to the internet and feels that she is very dependent on her boyfriend's parents with whom she lives and she would like to change that.    Chief Complaint: feelings of anxiety and depression    History of Present Illness: patient has battled depression and anxiety for several years.       Clinical Maneuvering/Intervention: care planning    (Scales based on 0 - 10 with 10 being the worst)  Depression: 8 Anxiety: 8       Assisted patient in developing a care plan to address current needs and concerns and setting baselines for the identified problems on the care plan as well as processing above session content; acknowledged and normalized patient’s thoughts, feelings, and concerns.  Rationalized patient thought process regarding her needs and concerns  Discussed triggers associated with patient's anxiety and depression such as social situations, fighting with boyfriend, and not having .  Also discussed coping skills for patient to implement such as trying to complete more activities during the day, looking for jobs that suite her interest, and asking for assistance with learning the things that she feels she should know by this time in her life.    Allowed patient to freely discuss issues without interruption or judgment. Provided safe, confidential environment to facilitate the development of positive therapeutic relationship and encourage open, honest communication. Assisted patient in identifying risk factors which would indicate the need for higher level of care including thoughts to harm self or others and/or self-harming behavior and encouraged patient to contact this office, call 911, or present to the nearest emergency room should any of these events occur. Discussed crisis intervention services and means to access. Patient adamantly and convincingly denies current suicidal or homicidal ideation or perceptual  disturbance.    Assessment:   Assessment   Patient was able to work with therapist in order to develop a care plan in order to address current needs and concerns and set baselines for the identified problems on the care plan. Patient continues to struggle with feelings of depression and anxiety which continues to cause impairment in important areas of functioning.  A result, they can be reasonably expected to continue to benefit from treatment and would likely be at increased risk for decompensation otherwise.    Mental Status Exam:   Hygiene:   good  Cooperation:  Cooperative  Eye Contact:  Good  Psychomotor Behavior:  Appropriate  Affect:  Appropriate  Mood: fluctates  Speech:  Normal  Thought Process:  Goal directed  Thought Content:  Normal  Suicidal:  None  Homicidal:  None  Hallucinations:  None  Delusion:  None  Memory:  Deficits at times such as for getting to take her medications and locking her keys in the car  Orientation:  Person, Place, Time and Situation  Reliability:  fair  Insight:  Fair  Judgement:  Fair  Impulse Control:  Good  Physical/Medical Issues:  No        Patient's Support Network Includes:  significant other, extended family and friends    Functional Status: Moderate impairment     Progress toward goal: Not at goal; goals were established today with the development of the care plan     Prognosis: Fair with Ongoing Treatment          Plan:    Patient will continue in individual outpatient therapy with focus on improved functioning and coping skills, maintaining stability, and avoiding decompensation and the need for higher level of care.    Patient will adhere to medication regimen as prescribed and report any side effects. Patient will contact this office, call 911 or present to the nearest emergency room should suicidal or homicidal ideations occur. Provide Cognitive Behavioral Therapy and Solution Focused Therapy to improve functioning, maintain stability, and avoid decompensation and  the need for higher level of care.     Return in about 2 weeks, or earlier if symptoms worsen or fail to improve.           VISIT DIAGNOSIS:     ICD-10-CM ICD-9-CM   1. Generalized anxiety disorder  F41.1 300.02             This document has been electronically signed by PJ Toussaint  April 7, 2022 12:04 EDT      Part of this note may be an electronic transcription/translation of spoken language to printed text using the Dragon Dictation System.

## 2022-04-14 DIAGNOSIS — Z30.41 ENCOUNTER FOR SURVEILLANCE OF CONTRACEPTIVE PILLS: ICD-10-CM

## 2022-04-14 RX ORDER — DROSPIRENONE AND ETHINYL ESTRADIOL 0.03MG-3MG
1 KIT ORAL DAILY
Qty: 28 TABLET | Refills: 3 | Status: CANCELLED | OUTPATIENT
Start: 2022-04-14

## 2022-04-15 NOTE — TELEPHONE ENCOUNTER
Rx Refill Note  Requested Prescriptions     Pending Prescriptions Disp Refills   • drospirenone-ethinyl estradiol (TICO,OCELLA) 3-0.03 MG per tablet 28 tablet 3     Sig: Take 1 tablet by mouth Daily.      Last filled:  Last office visit with prescribing clinician: 3/4/2022      Next office visit with prescribing clinician: Visit date not found     April DIVYA Chase MA  04/15/22, 07:31 EDT     LVM for the patient explaining to her that she has some refills at her pharmacy and she needs to contact them to see if they just have these on hold for her. I advised her to call us back if they do not, office number was provided.

## 2022-04-20 ENCOUNTER — TELEMEDICINE (OUTPATIENT)
Dept: PSYCHIATRY | Facility: CLINIC | Age: 27
End: 2022-04-20

## 2022-04-20 DIAGNOSIS — F41.9 ANXIETY DISORDER, UNSPECIFIED TYPE: Chronic | ICD-10-CM

## 2022-04-20 DIAGNOSIS — F33.1 MAJOR DEPRESSIVE DISORDER, RECURRENT EPISODE, MODERATE: Primary | Chronic | ICD-10-CM

## 2022-04-20 DIAGNOSIS — F51.5 NIGHTMARES: ICD-10-CM

## 2022-04-20 DIAGNOSIS — G47.9 SLEEPING DIFFICULTIES: ICD-10-CM

## 2022-04-20 PROCEDURE — 99214 OFFICE O/P EST MOD 30 MIN: CPT | Performed by: NURSE PRACTITIONER

## 2022-04-20 RX ORDER — FLUOXETINE HYDROCHLORIDE 40 MG/1
40 CAPSULE ORAL DAILY
Qty: 30 CAPSULE | Refills: 0 | Status: SHIPPED | OUTPATIENT
Start: 2022-04-20 | End: 2022-05-18 | Stop reason: SDUPTHER

## 2022-04-20 RX ORDER — LAMOTRIGINE 25 MG/1
50 TABLET ORAL DAILY
Qty: 60 TABLET | Refills: 0 | Status: SHIPPED | OUTPATIENT
Start: 2022-04-20 | End: 2022-05-18 | Stop reason: SDUPTHER

## 2022-04-20 RX ORDER — PRAZOSIN HYDROCHLORIDE 1 MG/1
1 CAPSULE ORAL NIGHTLY
Qty: 30 CAPSULE | Refills: 0 | Status: SHIPPED | OUTPATIENT
Start: 2022-04-20 | End: 2022-05-18 | Stop reason: SDUPTHER

## 2022-04-20 RX ORDER — HYDROXYZINE HYDROCHLORIDE 25 MG/1
25 TABLET, FILM COATED ORAL NIGHTLY PRN
Qty: 30 TABLET | Refills: 0 | Status: SHIPPED | OUTPATIENT
Start: 2022-04-20 | End: 2022-05-18 | Stop reason: SDUPTHER
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
## 2022-04-21 ENCOUNTER — TELEMEDICINE (OUTPATIENT)
Dept: PSYCHIATRY | Facility: CLINIC | Age: 27
End: 2022-04-21

## 2022-04-21 DIAGNOSIS — F33.1 MODERATE EPISODE OF RECURRENT MAJOR DEPRESSIVE DISORDER: ICD-10-CM

## 2022-04-21 DIAGNOSIS — F41.1 GENERALIZED ANXIETY DISORDER: Primary | ICD-10-CM

## 2022-04-21 PROCEDURE — 90837 PSYTX W PT 60 MINUTES: CPT | Performed by: COUNSELOR

## 2022-04-21 NOTE — PROGRESS NOTES
Date: April 21, 2022  Time In: 10:06 AM  Time Out: 11:03 AM   This provider is located at the [facility] (through [facility]), [address] [place] [address], [place] [address] using a secure [email or web address] Video Visit through [employer]. Patient is being seen remotely via telehealth at home address in [place] and stated they are in a secure environment for this session. The patient's condition being diagnosed/treated is appropriate for telemedicine. The provider identified herself as well as her credentials. The patient, and/or patients guardian, consent to be seen remotely, and when consent is given they understand that the consent allows for patient identifiable information to be sent to a third party as needed. They may refuse to be seen remotely at any time. The electronic data is encrypted and password protected, and the patient and/or guardian has been advised of the potential risks to privacy not withstanding such measures.     You have chosen to receive care through a telehealth visit.  Do you consent to use a video/audio connection for your medical care today? Yes    PROGRESS NOTE  Data:  Maddy Chisholm is a 27 y.o. female who presents today for follow up.  Patient states that it has been a month since she quite her job and in another month she will be trying to get another job. Patient states that she also got a Wii Fit and is interested in getting in better shape. Patient states that she has been having dreams of past sexual encounters and pornography. Patient states that from the ages of [age] she was promiscuous and that before the age of 14 she feels that she had intrusive thoughts of sex. Patient denies any sexual abuse or sexual experimentation before the age of 14; patient does state that she had started talking to men on the internet between the ages of [age] and states that she learned what men want. Patient states that she now knows that what she did back then was  wrong and states that she doesn't want to go back to being that person again. Patient states that she has trouble with setting boundaries and making social connections at times and that leads her to jealousy with her boyfriend. Patient states that she wants a healthy relationship and wants to feel more normal but admits that while she wants a future with her boyfriend and wants to  him someday that they need to have more conversations about their relationship and what each of them feels is and isn't acceptable as far as their behaviors in the relationship.    Chief Complaint: sexual dreams/nightmares, anxiety, depression    History of Present Illness: patient has battled feelings of anxiety and depression for several years.       Clinical Maneuvering/Intervention:  CBT    (Scales based on 0 - 10 with 10 being the worst)  Depression: 8 Anxiety: 8       Assisted patient in processing above session content; acknowledged and normalized patient’s thoughts, feelings, and concerns.  Rationalized patient thought process regarding her sexual past and troublesome dreams.  Discussed triggers associated with patient's anxiety and nightmares such as seeing old friends or men she has been with in the past on Facebook and her feelings of guilt about her past relationships.   Also discussed coping skills for patient to implement such as increasing positive social contacts, defining boundaries in relationships, and thought stopping for when she is having intrusive sexual thoughts.    Allowed patient to freely discuss issues without interruption or judgment. Provided safe, confidential environment to facilitate the development of positive therapeutic relationship and encourage open, honest communication. Assisted patient in identifying risk factors which would indicate the need for higher level of care including thoughts to harm self or others and/or self-harming behavior and encouraged patient to contact this office, call 911,  or present to the nearest emergency room should any of these events occur. Discussed crisis intervention services and means to access. Patient adamantly and convincingly denies current suicidal or homicidal ideation or perceptual disturbance.    Assessment:   Assessment   Patient appears to maintain relative stability as compared to their baseline.  However, patient continues to struggle with feelings of depression and anxiety which continues to cause impairment in important areas of functioning.  A result, they can be reasonably expected to continue to benefit from treatment and would likely be at increased risk for decompensation otherwise.    Mental Status Exam:   Hygiene:   good  Cooperation:  Cooperative  Eye Contact:  Fair  Psychomotor Behavior:  Appropriate  Affect:  Appropriate  Mood: fluctates  Speech:  Normal  Thought Process:  Goal directed  Thought Content:  Normal  Suicidal:  None  Homicidal:  None  Hallucinations:  None  Delusion:  None  Memory:  Intact  Orientation:  Person, Place, Time and Situation  Reliability:  fair  Insight:  Fair  Judgement:  Fair  Impulse Control:  Fair  Physical/Medical Issues:  No        Patient's Support Network Includes:  significant other and extended family    Functional Status: Moderate impairment     Progress toward goal: Not at goal    Prognosis: Fair with Ongoing Treatment          Plan:    Patient will continue in individual outpatient therapy with focus on improved functioning and coping skills, maintaining stability, and avoiding decompensation and the need for higher level of care.    Patient will adhere to medication regimen as prescribed and report any side effects. Patient will contact this office, call 911 or present to the nearest emergency room should suicidal or homicidal ideations occur. Provide Cognitive Behavioral Therapy and Solution Focused Therapy to improve functioning, maintain stability, and avoid decompensation and the need for higher level of  care.     Return in about 5 weeks, or earlier if symptoms worsen or fail to improve.           VISIT DIAGNOSIS:     ICD-10-CM ICD-9-CM   1. Generalized anxiety disorder  F41.1 300.02   2. Moderate episode of recurrent major depressive disorder (HCC)  F33.1 296.32             This document has been electronically signed by PJ Toussaint  April 21, 2022 10:50 EDT      Part of this note may be an electronic transcription/translation of spoken language to printed text using the Dragon Dictation System.

## 2022-05-07 DIAGNOSIS — Z30.41 ENCOUNTER FOR SURVEILLANCE OF CONTRACEPTIVE PILLS: ICD-10-CM

## 2022-05-07 RX ORDER — DROSPIRENONE AND ETHINYL ESTRADIOL 0.03MG-3MG
1 KIT ORAL DAILY
Qty: 28 TABLET | Refills: 3 | Status: CANCELLED | OUTPATIENT
Start: 2022-05-07

## 2022-05-09 NOTE — TELEPHONE ENCOUNTER
Rx Refill Note  Requested Prescriptions     Pending Prescriptions Disp Refills   • drospirenone-ethinyl estradiol (TICO,OCELLA) 3-0.03 MG per tablet 28 tablet 3     Sig: Take 1 tablet by mouth Daily.      Last filled:  Last office visit with prescribing clinician: 3/4/2022      Next office visit with prescribing clinician: Visit date not found     April DIVYA Chase MA  05/09/22, 07:45 EDT     Refilled for 4 months in March, not due for a refill.

## 2022-05-18 ENCOUNTER — TELEMEDICINE (OUTPATIENT)
Dept: PSYCHIATRY | Facility: CLINIC | Age: 27
End: 2022-05-18

## 2022-05-18 DIAGNOSIS — F33.1 MAJOR DEPRESSIVE DISORDER, RECURRENT EPISODE, MODERATE: Primary | Chronic | ICD-10-CM

## 2022-05-18 DIAGNOSIS — F41.9 ANXIETY DISORDER, UNSPECIFIED TYPE: Chronic | ICD-10-CM

## 2022-05-18 DIAGNOSIS — G47.9 SLEEPING DIFFICULTIES: ICD-10-CM

## 2022-05-18 DIAGNOSIS — F51.5 NIGHTMARES: ICD-10-CM

## 2022-05-18 PROCEDURE — 99214 OFFICE O/P EST MOD 30 MIN: CPT | Performed by: NURSE PRACTITIONER

## 2022-05-18 RX ORDER — LAMOTRIGINE 25 MG/1
50 TABLET ORAL DAILY
Qty: 60 TABLET | Refills: 0 | Status: SHIPPED | OUTPATIENT
Start: 2022-05-18 | End: 2022-06-20 | Stop reason: SDUPTHER

## 2022-05-18 RX ORDER — PRAZOSIN HYDROCHLORIDE 2 MG/1
2 CAPSULE ORAL NIGHTLY
Qty: 30 CAPSULE | Refills: 0 | Status: SHIPPED | OUTPATIENT
Start: 2022-05-18 | End: 2022-06-20 | Stop reason: SDUPTHER

## 2022-05-18 RX ORDER — HYDROXYZINE HYDROCHLORIDE 25 MG/1
25 TABLET, FILM COATED ORAL NIGHTLY PRN
Qty: 30 TABLET | Refills: 0 | Status: SHIPPED | OUTPATIENT
Start: 2022-05-18 | End: 2022-06-20 | Stop reason: SDUPTHER

## 2022-05-18 RX ORDER — FLUOXETINE HYDROCHLORIDE 40 MG/1
40 CAPSULE ORAL DAILY
Qty: 30 CAPSULE | Refills: 0 | Status: SHIPPED | OUTPATIENT
Start: 2022-05-18 | End: 2022-06-20 | Stop reason: SDUPTHER

## 2022-05-18 NOTE — PROGRESS NOTES
This provider is located at the [facility] (through [facility]), [address], [facility], [address] using a secure [employer] Video Visit through [employer]. Patient is being seen remotely via telehealth at their home address in [place], and stated they are in a secure environment for this session. The patient's condition being diagnosed/treated is appropriate for telemedicine. The provider identified herself as well as her credentials.   The patient, and/or patients guardian, consent to be seen remotely, and when consent is given they understand that the consent allows for patient identifiable information to be sent to a third party as needed.   They may refuse to be seen remotely at any time. The electronic data is encrypted and password protected, and the patient and/or guardian has been advised of the potential risks to privacy not withstanding such measures.    You have chosen to receive care through a telehealth visit.  Do you consent to use a video/audio connection for your medical care today? Yes        Subjective   Maddy Chisholm is a 27 y.o. female who presents today for follow up    Chief Complaint: Depression, anxiety, sleeping difficulties, and nightmares    Accompanied by: The patient is alone at today's encounter    History of Present Illness:   The patient describes her mood as a little more depressed over the last few weeks.  The patient states she is still working out a lot, eating healthier, and trying to be more psychically healthy.  The patient states she applied for a  job recently.  She reports her depression is a little worse as she is having trouble forgiving herself for her past behaviors.  The patient rates her symptoms of depression at an 8-9/10 on a 0-10 scale, with 10 being the worst.  The patient reports therapy has been going well, but she is nervous about therapy because it is harder than she thought it would be.  The patient  states she feels like she is going to open up all of her secrets to her therapist, and that her therapist is not going to want to talk to her because she is going to feel that the patient is a bad person.  This APRN has reassured the patient regarding professional therapeutic relationships with her providers.  The patient rates her symptoms of anxiety at a 7-8/10 on a 0-10 scale, with 10 being the worst.  The patient reports her appetite as good.  The patient reports her sleep as good because of working out makes her tired and she sleeps well, but she is still having frequent nightmares even with the recent addition of Prazosin.  The patient reports she is going to be discussing with her PCP soon possible treatment for hyperhidrosis.  She reports a lot of sweating around her face.  The patient denies any other new medical problems or changes in medications since last appointment with this facility.  The patient reports compliance with her current medication regimen.  The patient denies any side effects or concerns from her current medication regimen.   The patient would like to adjust her medications at this visit, specifically increase prazosin for continued nightmares of possible.  The patient denies any suicidal or homicidal ideations, plans, or intent at today's encounter and is convincing.  The patient denies any auditory hallucinations or visual hallucinations.  The patient does not endorse any significant symptoms consistent with oskar or psychosis at today's encounter.      Prior Psychiatric Medications:  Lexapro - decreased her libido so she stopped taking it (can't remember if it helped her mood or not)  Hydroxyzine 25 mg by mouth once daily at bedtime  Prozac  Lamictal  Prazosin      Last Menstrual Period:  1 week ago.  The patient was educated that her prescribed medications can have potential risk to a developing fetus. The patient is advised to contact this APRN/this office if she becomes pregnant or  plans to become pregnant.  Pt verbalizes understanding and acknowledged agreement with this plan in her own words.        The following portions of the patient's history were reviewed and updated as appropriate: allergies, current medications, past family history, past medical history, past social history, past surgical history and problem list.            Past Medical History:  Past Medical History:   Diagnosis Date   • Anemia    • Anxiety    • Depression    • Vitamin B12 deficiency        Social History:  Social History     Socioeconomic History   • Marital status: Single   • Number of children: 0   Tobacco Use   • Smoking status: Former Smoker     Packs/day: 0.20     Years: 0.25     Pack years: 0.05     Types: Cigarettes     Start date: 2020     Quit date: 8/15/2020     Years since quittin.7   • Smokeless tobacco: Never Used   • Tobacco comment: smoked for 5 years  (black and milds for 5 years then restarted 2020 and quit 2020   Vaping Use   • Vaping Use: Never used   Substance and Sexual Activity   • Alcohol use: Yes     Comment: Only occasionally, states not frequently   • Drug use: Not Currently     Comment: The patient states in her past she has used marijuanna, but not currently   • Sexual activity: Yes     Partners: Male     Birth control/protection: OCP       Family History:  Family History   Adopted: Yes       Past Surgical History:  Past Surgical History:   Procedure Laterality Date   • NO PAST SURGERIES         Problem List:  Patient Active Problem List   Diagnosis   • Iron deficiency anemia secondary to inadequate dietary iron intake   • B12 deficiency   • Anxiety   • BV (bacterial vaginosis)   • Bronchitis   • Allergic rhinitis   • Viral infection   • Vitiligo   • Wheezing   • Generalized anxiety disorder   • Moderate episode of recurrent major depressive disorder (HCC)       Allergy:   No Known Allergies     Current Medications:   Current Outpatient Medications   Medication Sig Dispense  Refill   • FLUoxetine (PROzac) 40 MG capsule Take 1 capsule by mouth Daily. 30 capsule 0   • hydrOXYzine (ATARAX) 25 MG tablet Take 1 tablet by mouth At Night As Needed (anxiety and/or sleep). 30 tablet 0   • lamoTRIgine (LaMICtal) 25 MG tablet Take 2 tablets by mouth Daily. 60 tablet 0   • prazosin (MINIPRESS) 2 MG capsule Take 1 capsule by mouth Every Night. 30 capsule 0   • cetirizine (zyrTEC) 10 MG tablet Take 1 tablet by mouth Daily. 30 tablet 3   • drospirenone-ethinyl estradiol (TICO,OCELLA) 3-0.03 MG per tablet Take 1 tablet by mouth Daily. 28 tablet 3   • ferrous gluconate (FERGON) 324 MG tablet Take 1 tablet by mouth 3 (Three) Times a Day With Meals. 90 tablet 0   • fluticasone (FLONASE) 50 MCG/ACT nasal spray 2 sprays by Each Nare route Daily. 16 g 3   • guaiFENesin (Mucinex) 600 MG 12 hr tablet Take 2 tablets by mouth 2 (Two) Times a Day. 24 tablet 0     No current facility-administered medications for this visit.       Review of Symptoms:    Review of Systems   Psychiatric/Behavioral: Positive for decreased concentration, sleep disturbance, depressed mood and stress. Negative for agitation, behavioral problems, dysphoric mood, hallucinations, self-injury, suicidal ideas and negative for hyperactivity. The patient is nervous/anxious.          Physical Exam:   not currently breastfeeding. There is no height or weight on file to calculate BMI.   Due to the remote nature of this encounter (virtual encounter), vitals were unable to be obtained.  Height stated at 66.5 inches.  Weight stated around 170 pounds.      Physical Exam  Constitutional:       Appearance: Normal appearance.   Neurological:      Mental Status: She is alert and oriented to person, place, and time.   Psychiatric:         Attention and Perception: Attention normal. She is attentive.         Mood and Affect: Mood and affect normal.         Speech: Speech normal.         Behavior: Behavior normal. Behavior is cooperative.         Thought  Content: Thought content normal. Thought content is not paranoid or delusional. Thought content does not include homicidal or suicidal ideation. Thought content does not include homicidal or suicidal plan.         Cognition and Memory: Cognition and memory normal.         Judgment: Judgment normal.         Mental Status Exam:   Hygiene:   good  Cooperation:  Cooperative  Eye Contact:  Good  Psychomotor Behavior:  Appropriate  Affect:  Appropriate  Mood: normal  Hopelessness: Denies  Speech:  Normal  Thought Process:  Linear  Thought Content:  Mood congruent  Suicidal:  None  Homicidal:  None  Hallucinations:  None  Delusion:  None  Memory:  Intact  Orientation:  Person, Place, Time and Situation  Reliability:  good  Insight:  Good  Judgement:  Good  Impulse Control:  Good  Physical/Medical Issues:  No          Lab Results:   No visits with results within 1 Month(s) from this visit.   Latest known visit with results is:   Lab on 03/04/2022   Component Date Value Ref Range Status   • Vitamin B-12 03/04/2022 458  211 - 946 pg/mL Final   • Ferritin 03/04/2022 17.90  13.00 - 150.00 ng/mL Final   • Glucose 03/04/2022 83  65 - 99 mg/dL Final   • BUN 03/04/2022 12  6 - 20 mg/dL Final   • Creatinine 03/04/2022 0.91  0.57 - 1.00 mg/dL Final   • Sodium 03/04/2022 136  136 - 145 mmol/L Final   • Potassium 03/04/2022 4.2  3.5 - 5.2 mmol/L Final   • Chloride 03/04/2022 99  98 - 107 mmol/L Final   • CO2 03/04/2022 21.1 (A) 22.0 - 29.0 mmol/L Final   • Calcium 03/04/2022 10.2  8.6 - 10.5 mg/dL Final   • Total Protein 03/04/2022 7.9  6.0 - 8.5 g/dL Final   • Albumin 03/04/2022 4.40  3.50 - 5.20 g/dL Final   • ALT (SGPT) 03/04/2022 7  1 - 33 U/L Final   • AST (SGOT) 03/04/2022 17  1 - 32 U/L Final   • Alkaline Phosphatase 03/04/2022 89  39 - 117 U/L Final   • Total Bilirubin 03/04/2022 0.2  0.0 - 1.2 mg/dL Final   • Globulin 03/04/2022 3.5  gm/dL Final   • A/G Ratio 03/04/2022 1.3  g/dL Final   • BUN/Creatinine Ratio 03/04/2022 13.2   7.0 - 25.0 Final   • Anion Gap 03/04/2022 15.9 (A) 5.0 - 15.0 mmol/L Final   • eGFR 03/04/2022 88.9  >60.0 mL/min/1.73 Final    National Kidney Foundation and American Society of Nephrology (ASN) Task Force recommended calculation based on the Chronic Kidney Disease Epidemiology Collaboration (CKD-EPI) equation refit without adjustment for race.   • Hepatitis B Surface Ag 03/04/2022 Non-Reactive  Non-Reactive Final   • Hep A IgM 03/04/2022 Non-Reactive  Non-Reactive Final   • Hep B C IgM 03/04/2022 Non-Reactive  Non-Reactive Final   • Hepatitis C Ab 03/04/2022 Non-Reactive  Non-Reactive Final   • HIV-1/ HIV-2 03/04/2022 Non-Reactive  Non-Reactive Final    A non-reactive test result does not preclude the possibility of exposure to HIV or infection with HIV. An antibody response to recent exposure may take several months to reach detectable levels.   • HSV 1 IgG, Type Specific 03/04/2022 <0.91  0.00 - 0.90 index Final                                     Negative        <0.91                                   Equivocal 0.91 - 1.09                                   Positive        >1.09   Note: Negative indicates no antibodies detected to   HSV-1. Equivocal may suggest early infection.  If   clinically appropriate, retest at later date. Positive   indicates antibodies detected to HSV-1.   • HSV 2 IgG 03/04/2022 <0.91  0.00 - 0.90 index Final                                     Negative        <0.91                                   Equivocal 0.91 - 1.09                                   Positive        >1.09   Note: Negative indicates no HSV-2 antibodies detected.   Positive indicates HSV-2 antibodies detected.   Equivocal and low positive HSV-2 screens   (Index 0.91-5.00) may be false positive and are   reflexed to supplemental testing in accordance with   CDC guidelines.   • RPR 03/04/2022 Non-Reactive  Non-Reactive Final   • WBC 03/04/2022 6.61  3.40 - 10.80 10*3/mm3 Final   • RBC 03/04/2022 5.19  3.77 - 5.28  10*6/mm3 Final   • Hemoglobin 03/04/2022 12.4  12.0 - 15.9 g/dL Final   • Hematocrit 03/04/2022 38.9  34.0 - 46.6 % Final   • MCV 03/04/2022 75.0 (A) 79.0 - 97.0 fL Final   • MCH 03/04/2022 23.9 (A) 26.6 - 33.0 pg Final   • MCHC 03/04/2022 31.9  31.5 - 35.7 g/dL Final   • RDW 03/04/2022 14.5  12.3 - 15.4 % Final   • RDW-SD 03/04/2022 38.5  37.0 - 54.0 fl Final   • MPV 03/04/2022 10.4  6.0 - 12.0 fL Final   • Platelets 03/04/2022 314  140 - 450 10*3/mm3 Final   • Neutrophil % 03/04/2022 70.0  42.7 - 76.0 % Final   • Lymphocyte % 03/04/2022 22.8  19.6 - 45.3 % Final   • Monocyte % 03/04/2022 6.5  5.0 - 12.0 % Final   • Eosinophil % 03/04/2022 0.2 (A) 0.3 - 6.2 % Final   • Basophil % 03/04/2022 0.3  0.0 - 1.5 % Final   • Immature Grans % 03/04/2022 0.2  0.0 - 0.5 % Final   • Neutrophils, Absolute 03/04/2022 4.63  1.70 - 7.00 10*3/mm3 Final   • Lymphocytes, Absolute 03/04/2022 1.51  0.70 - 3.10 10*3/mm3 Final   • Monocytes, Absolute 03/04/2022 0.43  0.10 - 0.90 10*3/mm3 Final   • Eosinophils, Absolute 03/04/2022 0.01  0.00 - 0.40 10*3/mm3 Final   • Basophils, Absolute 03/04/2022 0.02  0.00 - 0.20 10*3/mm3 Final   • Immature Grans, Absolute 03/04/2022 0.01  0.00 - 0.05 10*3/mm3 Final   • nRBC 03/04/2022 0.2  0.0 - 0.2 /100 WBC Final   • Atopobium Vaginae 03/04/2022 Low - 0  Score Final   • BVAB 2 03/04/2022 Low - 0  Score Final   • Megasphaera 1 03/04/2022 Low - 0  Score Final    Calculate total score by adding the 3 individual bacterial  vaginosis (BV) marker scores together.  Total score is  interpreted as follows:  Total score 0-1: Indicates the absence of BV.  Total score   2: Indeterminate for BV. Additional clinical                   data should be evaluated to establish a                   diagnosis.  Total score 3-6: Indicates the presence of BV.  This test was developed and its performance characteristics  determined by [facility].  It has not been cleared or approved  by the Food and Drug Administration.   •  Mayda Albicans, SHONDA 03/04/2022 Negative  Negative Final   • Mayda Glabrata, SHONDA 03/04/2022 Negative  Negative Final   • Trichomonas vaginosis 03/04/2022 Negative  Negative Final   • Chlamydia trachomatis, SHONDA 03/04/2022 Negative  Negative Final   • Neisseria gonorrhoeae, SHONDA 03/04/2022 Negative  Negative Final         Assessment & Plan   Problems Addressed this Visit    None     Visit Diagnoses     Major depressive disorder, recurrent episode, moderate (HCC)  (Chronic)   -  Primary    R/O BPD    Relevant Medications    FLUoxetine (PROzac) 40 MG capsule    hydrOXYzine (ATARAX) 25 MG tablet    lamoTRIgine (LaMICtal) 25 MG tablet    Anxiety disorder, unspecified type  (Chronic)       Relevant Medications    FLUoxetine (PROzac) 40 MG capsule    hydrOXYzine (ATARAX) 25 MG tablet    Sleeping difficulties        Relevant Medications    hydrOXYzine (ATARAX) 25 MG tablet    Nightmares        Relevant Medications    FLUoxetine (PROzac) 40 MG capsule    hydrOXYzine (ATARAX) 25 MG tablet    prazosin (MINIPRESS) 2 MG capsule      Diagnoses       Codes Comments    Major depressive disorder, recurrent episode, moderate (HCC)    -  Primary ICD-10-CM: F33.1  ICD-9-CM: 296.32 R/O BPD    Anxiety disorder, unspecified type     ICD-10-CM: F41.9  ICD-9-CM: 300.00     Sleeping difficulties     ICD-10-CM: G47.9  ICD-9-CM: 780.50     Nightmares     ICD-10-CM: F51.5  ICD-9-CM: 307.47           Visit Diagnoses:    ICD-10-CM ICD-9-CM   1. Major depressive disorder, recurrent episode, moderate (HCC)  F33.1 296.32   2. Anxiety disorder, unspecified type  F41.9 300.00   3. Sleeping difficulties  G47.9 780.50   4. Nightmares  F51.5 307.47          GOALS:  Short Term Goals: Patient will be compliant with medication, and patient will have no significant medication related side effects.  Patient will be engaged in psychotherapy as indicated.  Patient will report subjective improvement of symptoms.  Long term goals: To stabilize mood and  treat/improve subjective symptoms, the patient will stay out of the hospital, the patient will be at an optimal level of functioning, and the patient will take all medications as prescribed.  The patient verbalized understanding and agreement with goals that were mutually set.      TREATMENT PLAN: Continue supportive psychotherapy efforts and take medications as indicated.  Medication and treatment options, both pharmacological and non-pharmacological treatment options, discussed during today's visit, including any off label use of medication. Patient acknowledged and verbally consented with current treatment plan and was educated on the importance of compliance with treatment and follow-up appointments.      - Continue Prozac 40 mg by mouth once daily for mood.  - Continue hydroxyzine 25 mg by mouth once daily at bedtime as needed for sleep and/or anxiety.  - Continue lamotrigine 50 mg by mouth once daily for mood.  - Increase Prazosin to 2 mg by mouth once nightly at bedtime for nightmares.      MEDICATION ISSUES:  Discussed medication options and treatment plan of prescribed medication, any off label use of medication, as well as the risks, benefits, any black box warnings including increased suicidality, and side effects including but not limited to potential falls, dizziness, possible impaired driving, GI side effects (change in appetite, abdominal discomfort, nausea, vomiting, diarrhea, and/or constipation), dry mouth, somnolence, sedation, insomnia, activation, agitation, irritation, tremors, abnormal muscle movements or disorders, headache, sweating, possible bruising or rare bleeding, electrolyte and/or fluid abnormalities, change in blood pressure/heart rate/and or heart rhythm, sexual dysfunction, and metabolic adversities among others. Patient and/or guardian agreeable to call the office with any worsening of symptoms or onset of side effects, or if any concerns or questions arise.  The contact  information for the office is made available to the patient and/or guardian.  Patient and/or guardian agreeable to call 911 or go to the nearest ER should they begin having any SI/HI, or if any urgent concerns arise. No medication side effects or related complaints today.    This APRN has discussed the benefits and risks of taking/continuing Lamictal (Lamotrigine).  The side effects of Lamictal can include a benign rash, blurred or double vision, dizziness, ataxia, sedation, headache, tremor, insomnia, poor coordination, fatigue,  nausea, vomiting, dyspepsia, rhinitis, infection, pharyngitis, asthenia, a rare but serious rash, rare multi-organ failure associated with Santana-Jean Syndrome, toxic epidermal necrolysis, drug hypersensitivity syndrome, rare blood dyscrasias, rare aseptic meningitis, rare sudden unexplained deaths in people with epilepsy, withdrawal seizures upon abrupt withdrawal, and rare activation of suicidal ideation and behavior (suicidality).  This APRN has discussed that a very slow dose titration when starting, or changing doses, of Lamictal may reduce the incidence of skin rash and other side effects.  The dosage should not be titrated upwards or increased faster than recommended due to the possibility of the discussed side effects and risk of development of a skin rash (which can become life threatening).    This APRN has also discussed that if the patient stops taking the Lamictal for 3-5 days or longer, it will be necessary to restart the drug with an initial dose titration, as rashes have been reported on reexposure.  If the patient and Provider decide to stop the Lamictal, the patient will follow the directions of this APRN/this office as a guided taper over about two weeks is appropriate due to the risk of relapse in bipolar disorder with those with a mood or bipolar disorder, the risk of seizures in those with epilepsy, and discontinuation symptoms upon rapid discontinuation of  Lamictal.    The patient verbalizes understanding of benefits and risks as discussed, the patient/guardian feels the benefits outweigh the risks and is agreeable to continue/take Lamictal as discussed.  The patient is advised should any side effects or rash develops they are to stop the Lamictal immediately and contact this APRN/this office or go to the emergency department immediately.  The patient verbalizes understanding and agreement with treatment plan in their own words.      SUICIDE RISK ASSESSMENT AND SAFETY PLAN: Unalterable demographics and a history of mental health intervention indicate this patient is in a high risk category compared to the general population. At present, the patient denies active SI/HI, intentions, or plans at this time and agrees to seek immediate care should such thoughts develop. The patient verbalizes understanding of how to access emergency care if needed and agrees to do so. Consideration of suicide risk and protective factors such as history, current presentation, individual strengths and weaknesses, psychosocial and environmental stressors and variables, psychiatric illness and symptoms, medical conditions and pain, took place in this interview. Based on those considerations, the patient is determined: within individual baseline and presenting no imminent risk for suicide or homicide. Other recommendations: The patient does not meet the criteria for inpatient admission and is not a safety risk to self or others at today's visit. Inpatient treatment offers no significant advantages over outpatient treatment for this patient at today's visit.  The patient was given ample time for questions and fully participated in treatment planning.  The patient was encouraged to call the clinic with any questions or concerns.  The patient was informed of access to emergency care. If patient were to develop any significant symptomatology, suicidal ideation, homicidal ideation, any concerns, or  feel unsafe at any time they are to call the clinic and if unable to get immediate assistance should immediately call 911 or go to the nearest emergency room.  Patient contracted verbally for the following: If you are experiencing an emotional crisis or have thoughts of harming yourself or others, please go to your nearest local emergency room or call 911. Will continue to re-assess medication response and side effects frequently to establish efficacy and ensure safety. Risks, any black box warnings, side effects, off label usage, and benefits of medication and treatment discussed with patient, along with potential adverse side effects of current and/or newly prescribed medication, alternative treatment options, and OTC medications.  Patient verbalized understanding of potential risks, any off label use of medication, any black box warnings, and any side effects in their own words. The patient verbalized understanding and agreed to comply with the safety plan discussed in their own words.  Patient given the number to the office. Number also discussed of the 24- hour suicide hotline.           MEDS ORDERED DURING VISIT:  New Medications Ordered This Visit   Medications   • FLUoxetine (PROzac) 40 MG capsule     Sig: Take 1 capsule by mouth Daily.     Dispense:  30 capsule     Refill:  0   • hydrOXYzine (ATARAX) 25 MG tablet     Sig: Take 1 tablet by mouth At Night As Needed (anxiety and/or sleep).     Dispense:  30 tablet     Refill:  0   • lamoTRIgine (LaMICtal) 25 MG tablet     Sig: Take 2 tablets by mouth Daily.     Dispense:  60 tablet     Refill:  0   • prazosin (MINIPRESS) 2 MG capsule     Sig: Take 1 capsule by mouth Every Night.     Dispense:  30 capsule     Refill:  0       Return in about 4 weeks (around 6/15/2022), or if symptoms worsen or fail to improve, for Next scheduled follow up and Recheck.           Functional Status: Moderate impairment     Prognosis: Fair with Ongoing Treatment             This  document has been electronically signed by ARVIN Tamez  May 18, 2022 09:53 EDT    Some of the data in this electronic note has been brought forward from a previous encounter, any necessary changes have been made, it has been reviewed by this APRN, and it is accurate.    Please note that portions of this note were completed with a voice recognition program.

## 2022-06-02 DIAGNOSIS — Z30.41 ENCOUNTER FOR SURVEILLANCE OF CONTRACEPTIVE PILLS: ICD-10-CM

## 2022-06-03 RX ORDER — DROSPIRENONE AND ETHINYL ESTRADIOL 0.03MG-3MG
1 KIT ORAL DAILY
Qty: 28 TABLET | Refills: 3 | OUTPATIENT
Start: 2022-06-03

## 2022-06-08 ENCOUNTER — TELEMEDICINE (OUTPATIENT)
Dept: PSYCHIATRY | Facility: CLINIC | Age: 27
End: 2022-06-08

## 2022-06-08 DIAGNOSIS — F41.1 GENERALIZED ANXIETY DISORDER: Primary | ICD-10-CM

## 2022-06-08 DIAGNOSIS — F33.1 MODERATE EPISODE OF RECURRENT MAJOR DEPRESSIVE DISORDER: ICD-10-CM

## 2022-06-08 PROCEDURE — 90834 PSYTX W PT 45 MINUTES: CPT | Performed by: COUNSELOR

## 2022-06-08 NOTE — PROGRESS NOTES
Date: June 18, 2022  Time In: 11:15 AM   Time Out: 12:00 PM  This provider is located at the [facility]), [address], [place] [address] using a secure [employer] Video Visit through [employer]. Patient is being seen remotely via telehealth at home address in [place] and stated they are in a secure environment for this session. The patient's condition being diagnosed/treated is appropriate for telemedicine. The provider identified herself as well as her credentials. The patient, and/or patients guardian, consent to be seen remotely, and when consent is given they understand that the consent allows for patient identifiable information to be sent to a third party as needed. They may refuse to be seen remotely at any time. The electronic data is encrypted and password protected, and the patient and/or guardian has been advised of the potential risks to privacy not withstanding such measures.     You have chosen to receive care through a telehealth visit.  Do you consent to use a video/audio connection for your medical care today? Yes    PROGRESS NOTE  Data:  Maddy Chisholm is a 27 y.o. female who presents today for follow up. Patient states that she is continuing to work on remodeling her boyfriend's parents' basement. Patient is still looking for a job and is still experiencing anxiety with driving. Patient is also still bothered by her sexual dreams and why she is still having them when she is happy with her boyfriend. Discussed barriers to driving and job searches and how the patient may have to expand her job search for the time being in order to start making money until she finds the job that she wants. Also discussed the patient's feelings about her boyfriend and his job situation and what they want for their future.    Chief Complaint: feelings of anxiety and depression, sexual dreams    History of Present Illness: Patient has struggled with  feelings of depression and anxiety for several years      Clinical Maneuvering/Intervention:  Solution focused    (Scales based on 0 - 10 with 10 being the worst)  Depression: 6.5-7 Anxiety: 7.5-8       Assisted patient in processing above session content; acknowledged and normalized patient’s thoughts, feelings, and concerns.  Rationalized patient thought process regarding finding a job and her anxiety with driving .  Discussed triggers associated with patient's feelings of anxiety and depression such as driving in heavy traffic,looking for a job, and budgeting the remainder of her finances .  Also discussed coping skills for patient to implement such as setting a tight budget with her boyfriend, opening up her job search more, and asking someone to practice driving with her in addition to trying to find the triggers for her sexual dreams.    Allowed patient to freely discuss issues without interruption or judgment. Provided safe, confidential environment to facilitate the development of positive therapeutic relationship and encourage open, honest communication. Assisted patient in identifying risk factors which would indicate the need for higher level of care including thoughts to harm self or others and/or self-harming behavior and encouraged patient to contact this office, call 911, or present to the nearest emergency room should any of these events occur. Discussed crisis intervention services and means to access. Patient adamantly and convincingly denies current suicidal or homicidal ideation or perceptual disturbance.    Assessment:   Assessment   Patient appears to maintain relative stability as compared to their baseline.  However, patient continues to struggle with feelings of depression and anxiety which continues to cause impairment in important areas of functioning.  A result, they can be reasonably expected to continue to benefit from treatment and would likely be at increased risk for decompensation  otherwise.    Mental Status Exam:   Hygiene:   good  Cooperation:  Cooperative  Eye Contact:  Good  Psychomotor Behavior:  Appropriate  Affect:  Appropriate  Mood: anxious  Speech:  Normal  Thought Process:  Goal directed  Thought Content:  Normal  Suicidal:  None  Homicidal:  None  Hallucinations:  None  Delusion:  None  Memory:  Intact  Orientation:  Person, Place, Time and Situation  Reliability:  fair  Insight:  Fair  Judgement:  Good  Impulse Control:  Good  Physical/Medical Issues:  No        Patient's Support Network Includes:  significant other and boyfriend's parents    Functional Status: Moderate impairment     Progress toward goal: Not at goal    Prognosis: Fair with Ongoing Treatment          Plan:    Patient will continue in individual outpatient therapy with focus on improved functioning and coping skills, maintaining stability, and avoiding decompensation and the need for higher level of care.    Patient will adhere to medication regimen as prescribed and report any side effects. Patient will contact this office, call 911 or present to the nearest emergency room should suicidal or homicidal ideations occur. Provide Cognitive Behavioral Therapy and Solution Focused Therapy to improve functioning, maintain stability, and avoid decompensation and the need for higher level of care.     Return in about 2 weeks, or earlier if symptoms worsen or fail to improve.           VISIT DIAGNOSIS:     ICD-10-CM ICD-9-CM   1. Generalized anxiety disorder  F41.1 300.02   2. Moderate episode of recurrent major depressive disorder (HCC)  F33.1 296.32             This document has been electronically signed by PJ Toussaint  June 18, 2022 14:27 EDT      Part of this note may be an electronic transcription/translation of spoken language to printed text using the Dragon Dictation System.

## 2022-06-20 DIAGNOSIS — J30.9 ALLERGIC RHINITIS, UNSPECIFIED SEASONALITY, UNSPECIFIED TRIGGER: ICD-10-CM

## 2022-06-20 DIAGNOSIS — F33.1 MAJOR DEPRESSIVE DISORDER, RECURRENT EPISODE, MODERATE: Chronic | ICD-10-CM

## 2022-06-20 DIAGNOSIS — F51.5 NIGHTMARES: ICD-10-CM

## 2022-06-20 DIAGNOSIS — G47.9 SLEEPING DIFFICULTIES: ICD-10-CM

## 2022-06-20 DIAGNOSIS — Z30.41 ENCOUNTER FOR SURVEILLANCE OF CONTRACEPTIVE PILLS: ICD-10-CM

## 2022-06-20 DIAGNOSIS — F41.9 ANXIETY DISORDER, UNSPECIFIED TYPE: Chronic | ICD-10-CM

## 2022-06-20 RX ORDER — HYDROXYZINE HYDROCHLORIDE 25 MG/1
25 TABLET, FILM COATED ORAL NIGHTLY PRN
Qty: 30 TABLET | Refills: 0 | Status: SHIPPED | OUTPATIENT
Start: 2022-06-20 | End: 2022-08-03 | Stop reason: SDUPTHER

## 2022-06-20 RX ORDER — PRAZOSIN HYDROCHLORIDE 2 MG/1
2 CAPSULE ORAL NIGHTLY
Qty: 30 CAPSULE | Refills: 0 | Status: SHIPPED | OUTPATIENT
Start: 2022-06-20 | End: 2022-08-01

## 2022-06-20 RX ORDER — FLUTICASONE PROPIONATE 50 MCG
2 SPRAY, SUSPENSION (ML) NASAL DAILY
Qty: 16 G | Refills: 3 | Status: SHIPPED | OUTPATIENT
Start: 2022-06-20

## 2022-06-20 RX ORDER — FLUOXETINE HYDROCHLORIDE 40 MG/1
40 CAPSULE ORAL DAILY
Qty: 30 CAPSULE | Refills: 0 | Status: SHIPPED | OUTPATIENT
Start: 2022-06-20 | End: 2022-08-03 | Stop reason: SDUPTHER

## 2022-06-20 RX ORDER — DROSPIRENONE AND ETHINYL ESTRADIOL 0.03MG-3MG
1 KIT ORAL DAILY
Qty: 28 TABLET | Refills: 0 | Status: SHIPPED | OUTPATIENT
Start: 2022-06-20 | End: 2022-08-31 | Stop reason: SDUPTHER

## 2022-06-20 RX ORDER — LAMOTRIGINE 25 MG/1
50 TABLET ORAL DAILY
Qty: 60 TABLET | Refills: 0 | Status: SHIPPED | OUTPATIENT
Start: 2022-06-20 | End: 2022-08-03 | Stop reason: SDUPTHER

## 2022-06-20 NOTE — TELEPHONE ENCOUNTER
Rx Refill Note  Requested Prescriptions     Pending Prescriptions Disp Refills   • drospirenone-ethinyl estradiol (TICO,OCELLA) 3-0.03 MG per tablet 28 tablet 3     Sig: Take 1 tablet by mouth Daily.      Last filled:  Last office visit with prescribing clinician: 3/4/2022      Next office visit with prescribing clinician: Visit date not found     April DIVYA Chase MA  06/20/22, 14:40 EDT

## 2022-06-22 ENCOUNTER — TELEMEDICINE (OUTPATIENT)
Dept: PSYCHIATRY | Facility: CLINIC | Age: 27
End: 2022-06-22

## 2022-06-22 DIAGNOSIS — F41.1 GENERALIZED ANXIETY DISORDER: Primary | ICD-10-CM

## 2022-06-22 DIAGNOSIS — F33.1 MODERATE EPISODE OF RECURRENT MAJOR DEPRESSIVE DISORDER: ICD-10-CM

## 2022-06-22 PROCEDURE — 90834 PSYTX W PT 45 MINUTES: CPT | Performed by: COUNSELOR

## 2022-06-22 NOTE — PROGRESS NOTES
Date: June 22, 2022  Time In: 11:20 AM  Time Out: 12:07 PM  This provider is located at the [facility]), [address], [place] [address] using a secure [employer] Video Visit through [employer]. Patient is being seen remotely via telehealth at home address in [place] and stated they are in a secure environment for this session. The patient's condition being diagnosed/treated is appropriate for telemedicine. The provider identified herself as well as her credentials. The patient, and/or patients guardian, consent to be seen remotely, and when consent is given they understand that the consent allows for patient identifiable information to be sent to a third party as needed. They may refuse to be seen remotely at any time. The electronic data is encrypted and password protected, and the patient and/or guardian has been advised of the potential risks to privacy not withstanding such measures.     You have chosen to receive care through a telehealth visit.  Do you consent to use a video/audio connection for your medical care today? Yes    PROGRESS NOTE  Data:  Maddy Chisholm is a 27 y.o. female who presents today for follow up.  Patient states that her boyfriend is waiting on his paperwork to be finalized before he can return to work at the [employer] he had previously worked at. 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 Encouraged the patient to stay open to the idea of new employment opportunities.  Patient is struggling with increased anxiety and depression as her boyfriend is in a sexual orientation crisis. Patient states that he had tried exploring his sexuality in the past and it was not supported by his family. Patient states that her boyfriend told her that he loves her and would try  to not be who he wants to be but patient states that she wants him to be happy as well. Patient feels that her boyfriend's family looked to her as the first female he dated that wanted him to be a man and that she feels they had hope in her. Patient states that she feels she must support him through the judgement of his family and hers and is worried about what her family as Christians will think if her boyfriend changes his sexual orientation. Patient states that her boyfriend makes comments about girls having more choices in clothing and has talked about wanting a wedding dress. Patient is feeling confused and worried about the impact this could have on their relationship and how that will impact her going forward if he chooses to explore being of the opposite sex or changing his sexual orientation. Patient states that she has tried not to worry about this situation and just let it be what it will but states that she can't help but think about what could happen.    Chief Complaint: increased depression and anxiety, worry, decreased sexual dreams    History of Present Illness: patient has struggled with anxiety and depression for several years per her report.      Clinical Maneuvering/Intervention:  CBT    (Scales based on 0 - 10 with 10 being the worst)  Depression: 8 Anxiety: 7.5-8       Assisted patient in processing above session content; acknowledged and normalized patient’s thoughts, feelings, and concerns.  Rationalized patient thought process regarding her thoughts on her boyfriend's sexual orientation.  Discussed triggers associated with patient's feelings of anxiety and depression such as the potential changes she will have to make in her relationship, finding a job and potentially making decisions about the future of her relationship.   Also discussed coping skills for patient to implement such as evaluating her stance on sexuality, determining what she can and cannot accept in a relationship, and  focusing on getting a job to give her a change of pace.    Allowed patient to freely discuss issues without interruption or judgment. Provided safe, confidential environment to facilitate the development of positive therapeutic relationship and encourage open, honest communication. Assisted patient in identifying risk factors which would indicate the need for higher level of care including thoughts to harm self or others and/or self-harming behavior and encouraged patient to contact this office, call 911, or present to the nearest emergency room should any of these events occur. Discussed crisis intervention services and means to access. Patient adamantly and convincingly denies current suicidal or homicidal ideation or perceptual disturbance.    Assessment:   Assessment   Patient appears to maintain relative stability as compared to their baseline.  However, patient continues to struggle with feelings of depression and anxiety which continues to cause impairment in important areas of functioning.  A result, they can be reasonably expected to continue to benefit from treatment and would likely be at increased risk for decompensation otherwise.    Mental Status Exam:   Hygiene:   good  Cooperation:  Cooperative  Eye Contact:  Good  Psychomotor Behavior:  Appropriate  Affect:  Appropriate  Mood: sad  Speech:  Normal  Thought Process:  Goal directed  Thought Content:  Normal  Suicidal:  None  Homicidal:  None  Hallucinations:  None  Delusion:  None  Memory:  Intact  Orientation:  Person, Place, Time and Situation  Reliability:  good  Insight:  Fair  Judgement:  Good  Impulse Control:  Good  Physical/Medical Issues:  No        Patient's Support Network Includes:  significant other, parents, extended family and family of significant other    Functional Status: Moderate impairment     Progress toward goal: Not at goal    Prognosis: Good with Ongoing Treatment          Plan:    Patient will continue in individual  outpatient therapy with focus on improved functioning and coping skills, maintaining stability, and avoiding decompensation and the need for higher level of care.    Patient will adhere to medication regimen as prescribed and report any side effects. Patient will contact this office, call 911 or present to the nearest emergency room should suicidal or homicidal ideations occur. Provide Cognitive Behavioral Therapy and Solution Focused Therapy to improve functioning, maintain stability, and avoid decompensation and the need for higher level of care.     Return in about 2 weeks, or earlier if symptoms worsen or fail to improve.           VISIT DIAGNOSIS:     ICD-10-CM ICD-9-CM   1. Generalized anxiety disorder  F41.1 300.02   2. Moderate episode of recurrent major depressive disorder (HCC)  F33.1 296.32             This document has been electronically signed by PJ Toussaint  June 22, 2022 14:30 EDT      Part of this note may be an electronic transcription/translation of spoken language to printed text using the Dragon Dictation System.

## 2022-07-06 ENCOUNTER — TELEMEDICINE (OUTPATIENT)
Dept: PSYCHIATRY | Facility: CLINIC | Age: 27
End: 2022-07-06

## 2022-07-06 DIAGNOSIS — F41.1 GENERALIZED ANXIETY DISORDER: Primary | ICD-10-CM

## 2022-07-06 DIAGNOSIS — F33.1 MODERATE EPISODE OF RECURRENT MAJOR DEPRESSIVE DISORDER: ICD-10-CM

## 2022-07-06 PROCEDURE — 90834 PSYTX W PT 45 MINUTES: CPT | Performed by: COUNSELOR

## 2022-07-06 NOTE — PROGRESS NOTES
Date: July 19, 2022  Time In: 11:31 AM   Time Out: 12:13 PM  This provider is located at the [facility]), [address], [place] [address] using a secure [employer] Video Visit through [employer]. Patient is being seen remotely via telehealth at home address in [place] and stated they are in a secure environment for this session. The patient's condition being diagnosed/treated is appropriate for telemedicine. The provider identified herself as well as her credentials. The patient, and/or patients guardian, consent to be seen remotely, and when consent is given they understand that the consent allows for patient identifiable information to be sent to a third party as needed. They may refuse to be seen remotely at any time. The electronic data is encrypted and password protected, and the patient and/or guardian has been advised of the potential risks to privacy not withstanding such measures.     You have chosen to receive care through a telehealth visit.  Do you consent to use a video/audio connection for your medical care today? Yes    PROGRESS NOTE  Data:  Maddy Chisholm is a 27 y.o. female who presents today for follow up. 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 Patient has started her training for the new job and states that it is different than what she has done in the past. Patient states that she has been irritated lately but feels that this could be due to her menstrual cycle coming on. Patient states that she has been worried about her mother's health and is trying to keep in touch with her as much as possible. Patient states that she has been worried about money but is looking forward to getting her first paycheck. Patient states that her boyfriend is doing well with his job. Patient is still somewhat concerned with her relationship status given her boyfriend's questioning of  his sexual identity but states that he has not said anything else about it. Patient states that she is hopeful that things will settle down for her and her boyfriend once they both start bringing in money again so they can do more.        Chief Complaint: confusion, worry, adjusting to a new job    History of Present Illness: Patient has struggled with feelings of anxiety and depression for several years      Clinical Maneuvering/Intervention:  CBT    (Scales based on 0 - 10 with 10 being the worst)  Depression: 7.5-8 Anxiety: 7.5       Assisted patient in processing above session content; acknowledged and normalized patient’s thoughts, feelings, and concerns.  Rationalized patient thought process regarding starting a new job.  Discussed triggers associated with patient's feelings of depression and anxiety such as being worried about her mother's health, financial concerns, and worries about her relationship with her boyfriend.  Also discussed coping skills for patient to implement such as creating a budget with her boyfriend, openly discussing relationship concerns and taking time to adjust to her new job and maintaining as much contact as possible with her mother to help ease her mind about her mother's condition.    Allowed patient to freely discuss issues without interruption or judgment. Provided safe, confidential environment to facilitate the development of positive therapeutic relationship and encourage open, honest communication. Assisted patient in identifying risk factors which would indicate the need for higher level of care including thoughts to harm self or others and/or self-harming behavior and encouraged patient to contact this office, call 911, or present to the nearest emergency room should any of these events occur. Discussed crisis intervention services and means to access. Patient adamantly and convincingly denies current suicidal or homicidal ideation or perceptual disturbance.    Assessment:    Assessment   Patient appears to maintain relative stability as compared to their baseline.  However, patient continues to struggle with feelings of depression and anxiety which continues to cause impairment in important areas of functioning.  A result, they can be reasonably expected to continue to benefit from treatment and would likely be at increased risk for decompensation otherwise.    Mental Status Exam:   Hygiene:   good  Cooperation:  Cooperative  Eye Contact:  Good  Psychomotor Behavior:  Appropriate  Affect:  Appropriate  Mood: depressed and stressed  Speech:  Normal  Thought Process:  Goal directed  Thought Content:  Normal  Suicidal:  None  Homicidal:  None  Hallucinations:  None  Delusion:  None  Memory:  Intact  Orientation:  Person, Place, Time and Situation  Reliability:  fair  Insight:  Fair  Judgement:  Fair  Impulse Control:  Fair  Physical/Medical Issues:  No        Patient's Support Network Includes:  significant other, parents and family of significant other    Functional Status: Moderate impairment     Progress toward goal: Not at goal    Prognosis: Fair with Ongoing Treatment          Plan:    Patient will continue in individual outpatient therapy with focus on improved functioning and coping skills, maintaining stability, and avoiding decompensation and the need for higher level of care.    Patient will adhere to medication regimen as prescribed and report any side effects. Patient will contact this office, call 911 or present to the nearest emergency room should suicidal or homicidal ideations occur. Provide Cognitive Behavioral Therapy and Solution Focused Therapy to improve functioning, maintain stability, and avoid decompensation and the need for higher level of care.     Return in about 5 weeks, or earlier if symptoms worsen or fail to improve.           VISIT DIAGNOSIS:     ICD-10-CM ICD-9-CM   1. Generalized anxiety disorder  F41.1 300.02   2. Moderate episode of recurrent major  depressive disorder (HCC)  F33.1 296.32             This document has been electronically signed by PJ Toussaint  July 19, 2022 07:50 EDT      Part of this note may be an electronic transcription/translation of spoken language to printed text using the Dragon Dictation System.

## 2022-08-01 ENCOUNTER — LAB (OUTPATIENT)
Dept: LAB | Facility: HOSPITAL | Age: 27
End: 2022-08-01

## 2022-08-01 ENCOUNTER — OFFICE VISIT (OUTPATIENT)
Dept: INTERNAL MEDICINE | Facility: CLINIC | Age: 27
End: 2022-08-01

## 2022-08-01 VITALS
BODY MASS INDEX: 28.37 KG/M2 | WEIGHT: 176.5 LBS | DIASTOLIC BLOOD PRESSURE: 62 MMHG | OXYGEN SATURATION: 97 % | SYSTOLIC BLOOD PRESSURE: 110 MMHG | HEIGHT: 66 IN | TEMPERATURE: 96.6 F | RESPIRATION RATE: 16 BRPM | HEART RATE: 91 BPM

## 2022-08-01 DIAGNOSIS — E53.8 B12 DEFICIENCY: ICD-10-CM

## 2022-08-01 DIAGNOSIS — F51.5 NIGHTMARES: ICD-10-CM

## 2022-08-01 DIAGNOSIS — G25.81 RESTLESS LEG: ICD-10-CM

## 2022-08-01 DIAGNOSIS — R68.82 DECREASED LIBIDO: ICD-10-CM

## 2022-08-01 DIAGNOSIS — R61 EXCESSIVE SWEATING: Primary | ICD-10-CM

## 2022-08-01 PROCEDURE — 85027 COMPLETE CBC AUTOMATED: CPT | Performed by: NURSE PRACTITIONER

## 2022-08-01 PROCEDURE — 99214 OFFICE O/P EST MOD 30 MIN: CPT | Performed by: NURSE PRACTITIONER

## 2022-08-01 PROCEDURE — 82607 VITAMIN B-12: CPT | Performed by: NURSE PRACTITIONER

## 2022-08-01 PROCEDURE — 82728 ASSAY OF FERRITIN: CPT | Performed by: NURSE PRACTITIONER

## 2022-08-01 RX ORDER — PRAZOSIN HYDROCHLORIDE 2 MG/1
CAPSULE ORAL
Qty: 30 CAPSULE | Refills: 0 | Status: SHIPPED | OUTPATIENT
Start: 2022-08-01 | End: 2022-08-03 | Stop reason: SDUPTHER

## 2022-08-01 NOTE — PROGRESS NOTES
Maddy Chisholm presents to [facility] for     Chief Complaint  Chief Complaint   Patient presents with   • Libido      Having a low libido - 1 year    • Excessive Sweating     Has been going on for many years (forehead & nose)          Subjective        History of Present Illness    Maddy is a 27-year-old female who is here to discuss some excessive sweating and decreased libido.  She reports excessive sweating is mainly to her forehead and nose and has been ongoing for many years.  She has not tried anything for this.  She has had a decrease libido over the last year.  This corresponds with some of her medications that she is on for anxiety and depression.  She does follow with psychiatry who manages those medications.  She reports that she has restless legs in the evening or when she sits down.  This has been ongoing for years but is worsening more recently and she just wants to make me aware.  She has chronic B12 deficiency.  She has been using B12 Gummies recently.  She was previously on B12 injections.  No recent B12 level.        Review of Systems   Constitutional: Positive for diaphoresis and fatigue. Negative for fever and unexpected weight loss.   Eyes: Negative for blurred vision, double vision and visual disturbance.   Respiratory: Negative for cough, shortness of breath and wheezing.    Cardiovascular: Negative for chest pain, palpitations and leg swelling.   Gastrointestinal: Negative for abdominal pain, constipation, diarrhea, nausea and vomiting.   Genitourinary: Positive for decreased libido. Negative for difficulty urinating, frequency and urgency.   Musculoskeletal: Negative for arthralgias and myalgias.   Skin: Negative for color change and rash.   Neurological: Negative for dizziness, weakness and headache.   Hematological: Negative for adenopathy. Does not bruise/bleed easily.   Psychiatric/Behavioral: Positive for depressed mood. The patient is  "nervous/anxious.          No Known Allergies  Current Outpatient Medications on File Prior to Visit   Medication Sig Dispense Refill   • drospirenone-ethinyl estradiol (TICO,OCELLA) 3-0.03 MG per tablet Take 1 tablet by mouth Daily. 28 tablet 0   • fluticasone (FLONASE) 50 MCG/ACT nasal spray 2 sprays by Each Nare route Daily. 16 g 3     No current facility-administered medications on file prior to visit.         The following portions of the patient's history were reviewed and updated as appropriate: allergies, current medications, past family history, past medical history, past social history, past surgical history and problem list and are available for review within electronic record    Objective     Result Review :                    Vital Signs:   /62   Pulse 91   Temp 96.6 °F (35.9 °C) (Infrared)   Resp 16   Ht 167.9 cm (66.1\")   Wt 80.1 kg (176 lb 8 oz)   SpO2 97%   BMI 28.40 kg/m²         Physical Exam  Constitutional:       Appearance: Normal appearance. She is well-developed.   HENT:      Head: Normocephalic and atraumatic.   Eyes:      Conjunctiva/sclera: Conjunctivae normal.   Cardiovascular:      Rate and Rhythm: Normal rate and regular rhythm.      Heart sounds: Normal heart sounds.   Pulmonary:      Effort: Pulmonary effort is normal.      Breath sounds: Normal breath sounds.   Abdominal:      General: Bowel sounds are normal.      Palpations: Abdomen is soft.      Tenderness: There is no abdominal tenderness.   Musculoskeletal:         General: Normal range of motion.      Cervical back: Normal range of motion.   Skin:     General: Skin is warm and dry.   Neurological:      Mental Status: She is alert and oriented to person, place, and time.   Psychiatric:         Behavior: Behavior normal.         Thought Content: Thought content normal.         Judgment: Judgment normal.                 Assessment and Plan      Diagnoses and all orders for this visit:    1. Excessive sweating " (Primary)  -     aluminum chloride (DRYSOL) 20 % external solution; Apply  topically to the appropriate area as directed Every Night.  Dispense: 60 mL; Refill: 1  We will start Drysol.  Discussed indication, application, and expected effects.  Keep away from eyes.  Encouraged to chest in inconspicuous area prior to applying to upper forehead  2. Restless leg  -     CBC (No Diff); Future  -     Ferritin; Future  -     Vitamin B12; Future  We will check labs to further evaluate.  She is not interested in medications for this at this point.  Encouraged adequate hydration and well-balanced diet  3. B12 deficiency  -     Vitamin B12; Future  Recheck B12 level.  Continue OTC B12 Gummies for now  4.  Decreased libido  Discussed that this is likely a side effect of her current psychiatric medications and I would let her discuss this with psychiatry to see if adjustments could be made in medications.  Could potentially benefit from Wellbutrin but we will have her follow-up with psychiatry related to this.      Follow Up     Patient was given instructions and counseling regarding her condition or for health maintenance advice. Please see specific information pulled into the AVS if appropriate.   Any new medication's adverse effects have been discussed in detail with patient  Patient was encouraged to keep me informed of any acute changes, lack of improvement, or any new concerning symptoms.    Return in about 6 weeks (around 9/12/2022) for Recheck.          Dictated Utilizing Dragon Dictation   Please note that portions of this note were completed with a voice recognition program.   Part of this note may be an electronic transcription/translation of spoken language to printed text using the Dragon Dictation System.

## 2022-08-02 LAB
DEPRECATED RDW RBC AUTO: 38.4 FL (ref 37–54)
ERYTHROCYTE [DISTWIDTH] IN BLOOD BY AUTOMATED COUNT: 14.4 % (ref 12.3–15.4)
FERRITIN SERPL-MCNC: 28.7 NG/ML (ref 13–150)
HCT VFR BLD AUTO: 37.6 % (ref 34–46.6)
HGB BLD-MCNC: 12.2 G/DL (ref 12–15.9)
MCH RBC QN AUTO: 24.2 PG (ref 26.6–33)
MCHC RBC AUTO-ENTMCNC: 32.4 G/DL (ref 31.5–35.7)
MCV RBC AUTO: 74.5 FL (ref 79–97)
PLATELET # BLD AUTO: 256 10*3/MM3 (ref 140–450)
PMV BLD AUTO: 10.6 FL (ref 6–12)
RBC # BLD AUTO: 5.05 10*6/MM3 (ref 3.77–5.28)
VIT B12 BLD-MCNC: 686 PG/ML (ref 211–946)
WBC NRBC COR # BLD: 7.23 10*3/MM3 (ref 3.4–10.8)

## 2022-08-03 ENCOUNTER — TELEMEDICINE (OUTPATIENT)
Dept: PSYCHIATRY | Facility: CLINIC | Age: 27
End: 2022-08-03

## 2022-08-03 DIAGNOSIS — F41.9 ANXIETY DISORDER, UNSPECIFIED TYPE: Chronic | ICD-10-CM

## 2022-08-03 DIAGNOSIS — F33.1 MAJOR DEPRESSIVE DISORDER, RECURRENT EPISODE, MODERATE: Primary | Chronic | ICD-10-CM

## 2022-08-03 DIAGNOSIS — G47.9 SLEEPING DIFFICULTIES: ICD-10-CM

## 2022-08-03 DIAGNOSIS — F51.5 NIGHTMARES: ICD-10-CM

## 2022-08-03 PROCEDURE — 99214 OFFICE O/P EST MOD 30 MIN: CPT | Performed by: NURSE PRACTITIONER

## 2022-08-03 RX ORDER — HYDROXYZINE HYDROCHLORIDE 25 MG/1
25 TABLET, FILM COATED ORAL NIGHTLY PRN
Qty: 30 TABLET | Refills: 0 | Status: SHIPPED | OUTPATIENT
Start: 2022-08-03 | End: 2022-08-31 | Stop reason: SDUPTHER

## 2022-08-03 RX ORDER — LAMOTRIGINE 25 MG/1
50 TABLET ORAL DAILY
Qty: 60 TABLET | Refills: 0 | Status: SHIPPED | OUTPATIENT
Start: 2022-08-03 | End: 2022-08-31 | Stop reason: SDUPTHER

## 2022-08-03 RX ORDER — PRAZOSIN HYDROCHLORIDE 2 MG/1
2 CAPSULE ORAL NIGHTLY
Qty: 30 CAPSULE | Refills: 0 | Status: SHIPPED | OUTPATIENT
Start: 2022-08-03 | End: 2022-08-31 | Stop reason: SDUPTHER

## 2022-08-03 RX ORDER — FLUOXETINE HYDROCHLORIDE 40 MG/1
40 CAPSULE ORAL DAILY
Qty: 30 CAPSULE | Refills: 0 | Status: SHIPPED | OUTPATIENT
Start: 2022-08-03 | End: 2022-08-31

## 2022-08-03 RX ORDER — BUPROPION HYDROCHLORIDE 100 MG/1
100 TABLET, EXTENDED RELEASE ORAL EVERY MORNING
Qty: 30 TABLET | Refills: 0 | Status: SHIPPED | OUTPATIENT
Start: 2022-08-03 | End: 2022-08-31

## 2022-08-03 NOTE — PROGRESS NOTES
"This provider is located at the [facility] (through [facility]), [address], [facility], [address] using a secure [employer] Video Visit through [employer]. Patient is being seen remotely via telehealth at their home address in [place], and stated they are in a secure environment for this session. The patient's condition being diagnosed/treated is appropriate for telemedicine. The provider identified herself as well as her credentials.   The patient, and/or patients guardian, consent to be seen remotely, and when consent is given they understand that the consent allows for patient identifiable information to be sent to a third party as needed.   They may refuse to be seen remotely at any time. The electronic data is encrypted and password protected, and the patient and/or guardian has been advised of the potential risks to privacy not withstanding such measures.    You have chosen to receive care through a telehealth visit.  Do you consent to use a video/audio connection for your medical care today? Yes    Subjective   Maddy Chisholm is a 27 y.o. female who presents today for follow up    Chief Complaint: Depression, anxiety, sleeping difficulties, and nightmares follow-up    Accompanied by: The patient is alone at today's encounter    History of Present Illness:   The patient describes her mood as okay over the last few weeks.  The patient states her moods are worse \"during PMS\", but otherwise stable overall.  The patient rates her symptoms of depression at a 6.5-7/10 on a 0-10 scale, with 10 being the worst.  The patient rates her symptoms of anxiety at a 5.5/10 on a 0-10 scale, with 10 being the worst.  The patient reports her appetite as good.  The patient reports her sleep as fair.  The patient reports she has only recently begun the prazosin 2 mg tablets for nightmares.  The patient reports she is still currently having nightmares, and is having the prazosin " will help with them.  The patient reports her primary care provider started her on a facial medication to help with sweating/hyperhidrosis.  The patient denies any other new medical problems or changes in medications since last appointment with this facility.  The patient reports compliance with the rest of her current medication regimen.  The patient reports she is not sure If it is her medications, or it is her depressive symptoms, but she has a lack of libido.  The patient reports her primary care provider discussed with her possibly taking Wellbutrin, and she is interested in taking this as an adjunct for her mood and also to hopefully to offset some of her decreased libido symptoms.  The patient denies any other known side effects or concerns from her current medication regimen.   The patient would like to adjust her medications at this visit to help with her moods and decreased libido symptoms.  The patient denies any suicidal or homicidal ideations, plans, or intent at today's encounter and is convincing.  The patient denies any auditory hallucinations or visual hallucinations.  The patient does not endorse any significant symptoms consistent with oskar or psychosis at today's encounter.      Prior Psychiatric Medications:  Lexapro - decreased her libido so she stopped taking it (can't remember if it helped her mood or not)  Hydroxyzine 25 mg by mouth once daily at bedtime  Prozac  Lamictal  Prazosin      Last Menstrual Period:  3-4 weeks ago.  The patient was educated that her prescribed medications can have potential risk to a developing fetus. The patient is advised to contact this APRN/this office if she becomes pregnant or plans to become pregnant.  Pt verbalizes understanding and acknowledged agreement with this plan in her own words.        The following portions of the patient's history were reviewed and updated as appropriate: allergies, current medications, past family history, past medical history,  past social history, past surgical history and problem list.            Past Medical History:  Past Medical History:   Diagnosis Date   • Anemia    • Anxiety    • Depression    • Vitamin B12 deficiency        Social History:  Social History     Socioeconomic History   • Marital status: Single   • Number of children: 0   Tobacco Use   • Smoking status: Former Smoker     Packs/day: 0.20     Years: 0.25     Pack years: 0.05     Types: Cigarettes     Start date: 2020     Quit date: 8/15/2020     Years since quittin.9   • Smokeless tobacco: Never Used   • Tobacco comment: smoked for 5 years  (black and milds for 5 years then restarted 2020 and quit 2020   Vaping Use   • Vaping Use: Never used   Substance and Sexual Activity   • Alcohol use: Yes     Comment: Only occasionally, states not frequently   • Drug use: Not Currently     Comment: The patient states in her past she has used marijuanna, but not currently   • Sexual activity: Yes     Partners: Male     Birth control/protection: OCP       Family History:  Family History   Adopted: Yes       Past Surgical History:  Past Surgical History:   Procedure Laterality Date   • NO PAST SURGERIES         Problem List:  Patient Active Problem List   Diagnosis   • Iron deficiency anemia secondary to inadequate dietary iron intake   • B12 deficiency   • Anxiety   • BV (bacterial vaginosis)   • Bronchitis   • Allergic rhinitis   • Viral infection   • Vitiligo   • Wheezing   • Generalized anxiety disorder   • Moderate episode of recurrent major depressive disorder (HCC)   • Restless leg   • Excessive sweating       Allergy:   No Known Allergies     Current Medications:   Current Outpatient Medications   Medication Sig Dispense Refill   • FLUoxetine (PROzac) 40 MG capsule Take 1 capsule by mouth Daily. 30 capsule 0   • hydrOXYzine (ATARAX) 25 MG tablet Take 1 tablet by mouth At Night As Needed (anxiety and/or sleep). 30 tablet 0   • lamoTRIgine (LaMICtal) 25 MG tablet  Take 2 tablets by mouth Daily. 60 tablet 0   • prazosin (MINIPRESS) 2 MG capsule Take 1 capsule by mouth Every Night. 30 capsule 0   • aluminum chloride (DRYSOL) 20 % external solution Apply  topically to the appropriate area as directed Every Night. 60 mL 1   • buPROPion SR (Wellbutrin SR) 100 MG 12 hr tablet Take 1 tablet by mouth Every Morning. 30 tablet 0   • drospirenone-ethinyl estradiol (TICO,OCELLA) 3-0.03 MG per tablet Take 1 tablet by mouth Daily. 28 tablet 0   • fluticasone (FLONASE) 50 MCG/ACT nasal spray 2 sprays by Each Nare route Daily. 16 g 3     No current facility-administered medications for this visit.       Review of Symptoms:    Review of Systems   Psychiatric/Behavioral: Positive for decreased concentration, sleep disturbance, depressed mood and stress. Negative for agitation, behavioral problems, dysphoric mood, hallucinations, self-injury, suicidal ideas and negative for hyperactivity. The patient is nervous/anxious.          Physical Exam:   not currently breastfeeding. There is no height or weight on file to calculate BMI.   Due to the remote nature of this encounter (virtual encounter), vitals were unable to be obtained.  Height stated at 66.5 inches.  Weight stated around 170 pounds.      Physical Exam  Constitutional:       Appearance: Normal appearance.   Neurological:      Mental Status: She is alert and oriented to person, place, and time.   Psychiatric:         Attention and Perception: Attention normal. She is attentive.         Mood and Affect: Mood and affect normal.         Speech: Speech normal.         Behavior: Behavior normal. Behavior is cooperative.         Thought Content: Thought content normal. Thought content is not paranoid or delusional. Thought content does not include homicidal or suicidal ideation. Thought content does not include homicidal or suicidal plan.         Cognition and Memory: Cognition and memory normal.         Judgment: Judgment normal.          Mental Status Exam:   Hygiene:   good  Cooperation:  Cooperative  Eye Contact:  Good  Psychomotor Behavior:  Appropriate  Affect:  Appropriate  Mood: normal  Hopelessness: Denies  Speech:  Normal  Thought Process:  Linear  Thought Content:  Mood congruent  Suicidal:  None  Homicidal:  None  Hallucinations:  None  Delusion:  None  Memory:  Intact  Orientation:  Person, Place, Time and Situation  Reliability:  good  Insight:  Good  Judgement:  Good  Impulse Control:  Good  Physical/Medical Issues:  No          Lab Results:   Lab on 08/01/2022   Component Date Value Ref Range Status   • WBC 08/01/2022 7.23  3.40 - 10.80 10*3/mm3 Final   • RBC 08/01/2022 5.05  3.77 - 5.28 10*6/mm3 Final   • Hemoglobin 08/01/2022 12.2  12.0 - 15.9 g/dL Final   • Hematocrit 08/01/2022 37.6  34.0 - 46.6 % Final   • MCV 08/01/2022 74.5 (A) 79.0 - 97.0 fL Final   • MCH 08/01/2022 24.2 (A) 26.6 - 33.0 pg Final   • MCHC 08/01/2022 32.4  31.5 - 35.7 g/dL Final   • RDW 08/01/2022 14.4  12.3 - 15.4 % Final   • RDW-SD 08/01/2022 38.4  37.0 - 54.0 fl Final   • MPV 08/01/2022 10.6  6.0 - 12.0 fL Final   • Platelets 08/01/2022 256  140 - 450 10*3/mm3 Final   • Ferritin 08/01/2022 28.70  13.00 - 150.00 ng/mL Final   • Vitamin B-12 08/01/2022 686  211 - 946 pg/mL Final         Assessment & Plan   Problems Addressed this Visit    None     Visit Diagnoses     Major depressive disorder, recurrent episode, moderate (HCC)  (Chronic)   -  Primary    R/O BPD    Relevant Medications    lamoTRIgine (LaMICtal) 25 MG tablet    hydrOXYzine (ATARAX) 25 MG tablet    FLUoxetine (PROzac) 40 MG capsule    buPROPion SR (Wellbutrin SR) 100 MG 12 hr tablet    Anxiety disorder, unspecified type  (Chronic)       Relevant Medications    hydrOXYzine (ATARAX) 25 MG tablet    FLUoxetine (PROzac) 40 MG capsule    buPROPion SR (Wellbutrin SR) 100 MG 12 hr tablet    Sleeping difficulties        Relevant Medications    hydrOXYzine (ATARAX) 25 MG tablet    Nightmares         Relevant Medications    prazosin (MINIPRESS) 2 MG capsule    hydrOXYzine (ATARAX) 25 MG tablet    FLUoxetine (PROzac) 40 MG capsule    buPROPion SR (Wellbutrin SR) 100 MG 12 hr tablet      Diagnoses       Codes Comments    Major depressive disorder, recurrent episode, moderate (HCC)    -  Primary ICD-10-CM: F33.1  ICD-9-CM: 296.32 R/O BPD    Anxiety disorder, unspecified type     ICD-10-CM: F41.9  ICD-9-CM: 300.00     Sleeping difficulties     ICD-10-CM: G47.9  ICD-9-CM: 780.50     Nightmares     ICD-10-CM: F51.5  ICD-9-CM: 307.47           Visit Diagnoses:    ICD-10-CM ICD-9-CM   1. Major depressive disorder, recurrent episode, moderate (HCC)  F33.1 296.32   2. Anxiety disorder, unspecified type  F41.9 300.00   3. Sleeping difficulties  G47.9 780.50   4. Nightmares  F51.5 307.47          GOALS:  Short Term Goals: Patient will be compliant with medication, and patient will have no significant medication related side effects.  Patient will be engaged in psychotherapy as indicated.  Patient will report subjective improvement of symptoms.  Long term goals: To stabilize mood and treat/improve subjective symptoms, the patient will stay out of the hospital, the patient will be at an optimal level of functioning, and the patient will take all medications as prescribed.  The patient verbalized understanding and agreement with goals that were mutually set.      TREATMENT PLAN: Continue supportive psychotherapy efforts and take medications as indicated.  Medication and treatment options, both pharmacological and non-pharmacological treatment options, discussed during today's visit, including any off label use of medication. Patient acknowledged and verbally consented with current treatment plan and was educated on the importance of compliance with treatment and follow-up appointments.      - Continue Prozac 40 mg by mouth once daily for mood.  - Continue hydroxyzine 25 mg by mouth once daily at bedtime as needed for sleep  and/or anxiety.  - Continue lamotrigine 50 mg by mouth once daily for mood.  - Continue Prazosin 2 mg by mouth once nightly at bedtime for nightmares.  -Start Wellbutrin  mg by mouth once daily in the morning as an adjunct for mood.      MEDICATION ISSUES:  Discussed medication options and treatment plan of prescribed medication, any off label use of medication, as well as the risks, benefits, any black box warnings including increased suicidality, and side effects including but not limited to potential falls, dizziness, possible impaired driving, GI side effects (change in appetite, abdominal discomfort, nausea, vomiting, diarrhea, and/or constipation), dry mouth, somnolence, sedation, insomnia, activation, agitation, irritation, tremors, abnormal muscle movements or disorders, headache, sweating, possible bruising or rare bleeding, electrolyte and/or fluid abnormalities, change in blood pressure/heart rate/and or heart rhythm, sexual dysfunction, and metabolic adversities among others. Patient and/or guardian agreeable to call the office with any worsening of symptoms or onset of side effects, or if any concerns or questions arise.  The contact information for the office is made available to the patient and/or guardian.  Patient and/or guardian agreeable to call 911 or go to the nearest ER should they begin having any SI/HI, or if any urgent concerns arise. No medication side effects or related complaints today.    This APRN has discussed the benefits and risks of taking/continuing Lamictal (Lamotrigine).  The side effects of Lamictal can include a benign rash, blurred or double vision, dizziness, ataxia, sedation, headache, tremor, insomnia, poor coordination, fatigue,  nausea, vomiting, dyspepsia, rhinitis, infection, pharyngitis, asthenia, a rare but serious rash, rare multi-organ failure associated with Santana-Jean Syndrome, toxic epidermal necrolysis, drug hypersensitivity syndrome, rare blood  dyscrasias, rare aseptic meningitis, rare sudden unexplained deaths in people with epilepsy, withdrawal seizures upon abrupt withdrawal, and rare activation of suicidal ideation and behavior (suicidality).  This APRN has discussed that a very slow dose titration when starting, or changing doses, of Lamictal may reduce the incidence of skin rash and other side effects.  The dosage should not be titrated upwards or increased faster than recommended due to the possibility of the discussed side effects and risk of development of a skin rash (which can become life threatening).    This APRN has also discussed that if the patient stops taking the Lamictal for 3-5 days or longer, it will be necessary to restart the drug with an initial dose titration, as rashes have been reported on reexposure.  If the patient and Provider decide to stop the Lamictal, the patient will follow the directions of this APRN/this office as a guided taper over about two weeks is appropriate due to the risk of relapse in bipolar disorder with those with a mood or bipolar disorder, the risk of seizures in those with epilepsy, and discontinuation symptoms upon rapid discontinuation of Lamictal.    The patient verbalizes understanding of benefits and risks as discussed, the patient/guardian feels the benefits outweigh the risks and is agreeable to continue/take Lamictal as discussed.  The patient is advised should any side effects or rash develops they are to stop the Lamictal immediately and contact this APRN/this office or go to the emergency department immediately.  The patient verbalizes understanding and agreement with treatment plan in their own words.      SUICIDE RISK ASSESSMENT AND SAFETY PLAN: Unalterable demographics and a history of mental health intervention indicate this patient is in a high risk category compared to the general population. At present, the patient denies active SI/HI, intentions, or plans at this time and agrees to seek  immediate care should such thoughts develop. The patient verbalizes understanding of how to access emergency care if needed and agrees to do so. Consideration of suicide risk and protective factors such as history, current presentation, individual strengths and weaknesses, psychosocial and environmental stressors and variables, psychiatric illness and symptoms, medical conditions and pain, took place in this interview. Based on those considerations, the patient is determined: within individual baseline and presenting no imminent risk for suicide or homicide. Other recommendations: The patient does not meet the criteria for inpatient admission and is not a safety risk to self or others at today's visit. Inpatient treatment offers no significant advantages over outpatient treatment for this patient at today's visit.  The patient was given ample time for questions and fully participated in treatment planning.  The patient was encouraged to call the clinic with any questions or concerns.  The patient was informed of access to emergency care. If patient were to develop any significant symptomatology, suicidal ideation, homicidal ideation, any concerns, or feel unsafe at any time they are to call the clinic and if unable to get immediate assistance should immediately call 911 or go to the nearest emergency room.  Patient contracted verbally for the following: If you are experiencing an emotional crisis or have thoughts of harming yourself or others, please go to your nearest local emergency room or call 911. Will continue to re-assess medication response and side effects frequently to establish efficacy and ensure safety. Risks, any black box warnings, side effects, off label usage, and benefits of medication and treatment discussed with patient, along with potential adverse side effects of current and/or newly prescribed medication, alternative treatment options, and OTC medications.  Patient verbalized understanding of  potential risks, any off label use of medication, any black box warnings, and any side effects in their own words. The patient verbalized understanding and agreed to comply with the safety plan discussed in their own words.  Patient given the number to the office. Number also discussed of the 24- hour suicide hotline.           MEDS ORDERED DURING VISIT:  New Medications Ordered This Visit   Medications   • prazosin (MINIPRESS) 2 MG capsule     Sig: Take 1 capsule by mouth Every Night.     Dispense:  30 capsule     Refill:  0   • lamoTRIgine (LaMICtal) 25 MG tablet     Sig: Take 2 tablets by mouth Daily.     Dispense:  60 tablet     Refill:  0   • hydrOXYzine (ATARAX) 25 MG tablet     Sig: Take 1 tablet by mouth At Night As Needed (anxiety and/or sleep).     Dispense:  30 tablet     Refill:  0   • FLUoxetine (PROzac) 40 MG capsule     Sig: Take 1 capsule by mouth Daily.     Dispense:  30 capsule     Refill:  0   • buPROPion SR (Wellbutrin SR) 100 MG 12 hr tablet     Sig: Take 1 tablet by mouth Every Morning.     Dispense:  30 tablet     Refill:  0       Return in about 4 weeks (around 8/31/2022), or if symptoms worsen or fail to improve, for Next scheduled follow up and Recheck.           Functional Status: Moderate impairment     Prognosis: Fair with Ongoing Treatment             This document has been electronically signed by ARVIN Tamez  August 3, 2022 13:33 EDT    Some of the data in this electronic note has been brought forward from a previous encounter, any necessary changes have been made, it has been reviewed by this APRN, and it is accurate.    Please note that portions of this note were completed with a voice recognition program.

## 2022-08-23 ENCOUNTER — TELEMEDICINE (OUTPATIENT)
Dept: PSYCHIATRY | Facility: CLINIC | Age: 27
End: 2022-08-23

## 2022-08-23 DIAGNOSIS — F41.1 GENERALIZED ANXIETY DISORDER: Primary | ICD-10-CM

## 2022-08-23 DIAGNOSIS — F33.1 MODERATE EPISODE OF RECURRENT MAJOR DEPRESSIVE DISORDER: ICD-10-CM

## 2022-08-23 PROCEDURE — 90837 PSYTX W PT 60 MINUTES: CPT | Performed by: COUNSELOR

## 2022-08-31 ENCOUNTER — TELEMEDICINE (OUTPATIENT)
Dept: PSYCHIATRY | Facility: CLINIC | Age: 27
End: 2022-08-31

## 2022-08-31 DIAGNOSIS — Z30.41 ENCOUNTER FOR SURVEILLANCE OF CONTRACEPTIVE PILLS: ICD-10-CM

## 2022-08-31 DIAGNOSIS — F33.1 MAJOR DEPRESSIVE DISORDER, RECURRENT EPISODE, MODERATE: Primary | Chronic | ICD-10-CM

## 2022-08-31 DIAGNOSIS — G47.9 SLEEPING DIFFICULTIES: ICD-10-CM

## 2022-08-31 DIAGNOSIS — F41.9 ANXIETY DISORDER, UNSPECIFIED TYPE: Chronic | ICD-10-CM

## 2022-08-31 DIAGNOSIS — F51.5 NIGHTMARES: ICD-10-CM

## 2022-08-31 PROCEDURE — 99214 OFFICE O/P EST MOD 30 MIN: CPT | Performed by: NURSE PRACTITIONER

## 2022-08-31 RX ORDER — LAMOTRIGINE 25 MG/1
50 TABLET ORAL DAILY
Qty: 60 TABLET | Refills: 0 | Status: SHIPPED | OUTPATIENT
Start: 2022-08-31 | End: 2022-09-05 | Stop reason: SDUPTHER

## 2022-08-31 RX ORDER — VORTIOXETINE 5 MG/1
5 TABLET, FILM COATED ORAL
Qty: 30 TABLET | Refills: 0 | Status: SHIPPED | OUTPATIENT
Start: 2022-08-31 | End: 2022-09-28 | Stop reason: SDUPTHER

## 2022-08-31 RX ORDER — PRAZOSIN HYDROCHLORIDE 2 MG/1
2 CAPSULE ORAL NIGHTLY
Qty: 30 CAPSULE | Refills: 0 | Status: SHIPPED | OUTPATIENT
Start: 2022-08-31 | End: 2022-09-29 | Stop reason: SDUPTHER

## 2022-08-31 RX ORDER — HYDROXYZINE HYDROCHLORIDE 25 MG/1
25 TABLET, FILM COATED ORAL NIGHTLY PRN
Qty: 30 TABLET | Refills: 0 | Status: SHIPPED | OUTPATIENT
Start: 2022-08-31 | End: 2022-09-29 | Stop reason: SDUPTHER

## 2022-08-31 RX ORDER — DROSPIRENONE AND ETHINYL ESTRADIOL 0.03MG-3MG
1 KIT ORAL DAILY
Qty: 28 TABLET | Refills: 0 | Status: SHIPPED | OUTPATIENT
Start: 2022-08-31 | End: 2022-09-27 | Stop reason: SDUPTHER
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
## 2022-08-31 NOTE — TELEPHONE ENCOUNTER
Rx Refill Note  Requested Prescriptions     Pending Prescriptions Disp Refills   • drospirenone-ethinyl estradiol (TICO,OCELLA) 3-0.03 MG per tablet 28 tablet 0     Sig: Take 1 tablet by mouth Daily.      Last filled: 06/20/2022  Last office visit with prescribing clinician: 8/1/2022      Next office visit with prescribing clinician: 9/12/2022 April DIVYA Chase MA  08/31/22, 11:44 EDT

## 2022-09-05 DIAGNOSIS — F33.1 MAJOR DEPRESSIVE DISORDER, RECURRENT EPISODE, MODERATE: Chronic | ICD-10-CM

## 2022-09-06 RX ORDER — LAMOTRIGINE 25 MG/1
50 TABLET ORAL DAILY
Qty: 60 TABLET | Refills: 0 | Status: SHIPPED | OUTPATIENT
Start: 2022-09-06 | End: 2022-09-29 | Stop reason: SDUPTHER

## 2022-09-12 ENCOUNTER — OFFICE VISIT (OUTPATIENT)
Dept: INTERNAL MEDICINE | Facility: CLINIC | Age: 27
End: 2022-09-12

## 2022-09-12 VITALS
HEIGHT: 66 IN | TEMPERATURE: 97.3 F | HEART RATE: 99 BPM | BODY MASS INDEX: 28 KG/M2 | SYSTOLIC BLOOD PRESSURE: 120 MMHG | DIASTOLIC BLOOD PRESSURE: 72 MMHG | OXYGEN SATURATION: 98 % | WEIGHT: 174.2 LBS | RESPIRATION RATE: 18 BRPM

## 2022-09-12 DIAGNOSIS — R61 EXCESSIVE SWEATING: Primary | Chronic | ICD-10-CM

## 2022-09-12 DIAGNOSIS — E53.8 B12 DEFICIENCY: Chronic | ICD-10-CM

## 2022-09-12 DIAGNOSIS — G25.81 RESTLESS LEG: Chronic | ICD-10-CM

## 2022-09-12 PROBLEM — F33.1 MODERATE EPISODE OF RECURRENT MAJOR DEPRESSIVE DISORDER (HCC): Chronic | Status: ACTIVE | Noted: 2022-04-21

## 2022-09-12 PROBLEM — R06.2 WHEEZING: Status: RESOLVED | Noted: 2022-03-13 | Resolved: 2022-09-12

## 2022-09-12 PROBLEM — B34.9 VIRAL INFECTION: Status: RESOLVED | Noted: 2022-02-14 | Resolved: 2022-09-12

## 2022-09-12 PROBLEM — J40 BRONCHITIS: Status: RESOLVED | Noted: 2021-12-03 | Resolved: 2022-09-12

## 2022-09-12 PROBLEM — F41.1 GENERALIZED ANXIETY DISORDER: Chronic | Status: ACTIVE | Noted: 2022-04-21

## 2022-09-12 PROCEDURE — 99214 OFFICE O/P EST MOD 30 MIN: CPT | Performed by: NURSE PRACTITIONER

## 2022-09-26 ENCOUNTER — TELEMEDICINE (OUTPATIENT)
Dept: PSYCHIATRY | Facility: CLINIC | Age: 27
End: 2022-09-26

## 2022-09-26 DIAGNOSIS — F33.1 MODERATE EPISODE OF RECURRENT MAJOR DEPRESSIVE DISORDER: ICD-10-CM

## 2022-09-26 DIAGNOSIS — F41.1 GENERALIZED ANXIETY DISORDER: Primary | ICD-10-CM

## 2022-09-26 PROCEDURE — 90834 PSYTX W PT 45 MINUTES: CPT | Performed by: COUNSELOR

## 2022-09-27 DIAGNOSIS — Z30.41 ENCOUNTER FOR SURVEILLANCE OF CONTRACEPTIVE PILLS: ICD-10-CM

## 2022-09-27 RX ORDER — DROSPIRENONE AND ETHINYL ESTRADIOL 0.03MG-3MG
1 KIT ORAL DAILY
Qty: 28 TABLET | Refills: 0 | Status: SHIPPED | OUTPATIENT
Start: 2022-09-27 | End: 2022-10-26 | Stop reason: SDUPTHER

## 2022-09-27 NOTE — TELEPHONE ENCOUNTER
Rx Refill Note  Requested Prescriptions     Pending Prescriptions Disp Refills   • drospirenone-ethinyl estradiol (TICO,OCELLA) 3-0.03 MG per tablet 28 tablet 0     Sig: Take 1 tablet by mouth Daily.      Last filled:  Last office visit with prescribing clinician: 9/12/2022      Next office visit with prescribing clinician: 3/13/2023     April DIVYA Chase MA  09/27/22, 12:53 EDT     Are you okay refilling till her next appointment?

## 2022-09-28 DIAGNOSIS — F41.9 ANXIETY DISORDER, UNSPECIFIED TYPE: Chronic | ICD-10-CM

## 2022-09-28 DIAGNOSIS — F33.1 MAJOR DEPRESSIVE DISORDER, RECURRENT EPISODE, MODERATE: Chronic | ICD-10-CM

## 2022-09-29 ENCOUNTER — TELEMEDICINE (OUTPATIENT)
Dept: PSYCHIATRY | Facility: CLINIC | Age: 27
End: 2022-09-29

## 2022-09-29 DIAGNOSIS — G47.9 SLEEPING DIFFICULTIES: ICD-10-CM

## 2022-09-29 DIAGNOSIS — F33.1 MAJOR DEPRESSIVE DISORDER, RECURRENT EPISODE, MODERATE: Primary | Chronic | ICD-10-CM

## 2022-09-29 DIAGNOSIS — F51.5 NIGHTMARES: ICD-10-CM

## 2022-09-29 DIAGNOSIS — F41.9 ANXIETY DISORDER, UNSPECIFIED TYPE: Chronic | ICD-10-CM

## 2022-09-29 PROCEDURE — 99214 OFFICE O/P EST MOD 30 MIN: CPT | Performed by: NURSE PRACTITIONER

## 2022-09-29 RX ORDER — HYDROXYZINE HYDROCHLORIDE 25 MG/1
25 TABLET, FILM COATED ORAL NIGHTLY PRN
Qty: 30 TABLET | Refills: 0 | Status: SHIPPED | OUTPATIENT
Start: 2022-09-29 | End: 2022-10-27 | Stop reason: SDUPTHER

## 2022-09-29 RX ORDER — VORTIOXETINE 5 MG/1
5 TABLET, FILM COATED ORAL
Qty: 30 TABLET | Refills: 0 | Status: SHIPPED | OUTPATIENT
Start: 2022-09-29 | End: 2022-09-29 | Stop reason: DRUGHIGH

## 2022-09-29 RX ORDER — LAMOTRIGINE 25 MG/1
50 TABLET ORAL DAILY
Qty: 60 TABLET | Refills: 0 | Status: SHIPPED | OUTPATIENT
Start: 2022-09-29 | End: 2022-10-27 | Stop reason: SDUPTHER

## 2022-09-29 RX ORDER — PRAZOSIN HYDROCHLORIDE 2 MG/1
2 CAPSULE ORAL NIGHTLY
Qty: 30 CAPSULE | Refills: 0 | Status: SHIPPED | OUTPATIENT
Start: 2022-09-29 | End: 2022-10-26 | Stop reason: SDUPTHER

## 2022-09-29 NOTE — PROGRESS NOTES
"This provider is located at the [facility] (through [facility]), [address], [place], [address] using a secure [employer] Video Visit through [employer]. Patient is being seen remotely via telehealth at their home address in [place], and stated they are in a secure environment for this session. The patient's condition being diagnosed/treated is appropriate for telemedicine. The provider identified herself as well as her credentials.   The patient, and/or patients guardian, consent to be seen remotely, and when consent is given they understand that the consent allows for patient identifiable information to be sent to a third party as needed.   They may refuse to be seen remotely at any time. The electronic data is encrypted and password protected, and the patient and/or guardian has been advised of the potential risks to privacy not withstanding such measures.    You have chosen to receive care through a telehealth visit.  Do you consent to use a video/audio connection for your medical care today? Yes    Subjective   Maddy Chisholm is a 27 y.o. female who presents today for follow up    Chief Complaint: Depression, anxiety, sleeping difficulties, and nightmares follow-up    Accompanied by: The patient is alone at today's encounter    History of Present Illness:   The patient describes her mood as happier over the last few weeks, and since starting Trintellix.  The patient states she feels her libido has improved \"a little bit\" since her recent medication changes.  The patient reports she does have some current situational stressors in her life, and reports her symptoms of anxiety and depression both fluctuate due to her situational stressors.  The patient reports she is happy right now because her parents are in from out of country visiting until mid October.  The patient rates her symptoms of depression at a 7-7.5/10 on a 0-10 scale, with 10 being the worst.  The " patient rates her symptoms of anxiety at a 7-7.5/10 on a 0-10 scale, with 10 being the worst.  The patient reports her appetite as good.  The patient reports her sleep as good.  The patient reports she is still having nightmares, but they are more about work now and not the past.  She reports averaging 7 hours of sleep each night.  The patient denies any new medical problems or changes in medications since last appointment with this facility.  The patient reports compliance with her current medication regimen.  The patient denies any side effects or concerns from her current medication regimen.   The patient would like to increase her medication at this visit, specifically the Trintellix if possible for continued depressive and anxious symptoms.  The patient denies any suicidal or homicidal ideations, plans, or intent at today's encounter and is convincing.  The patient denies any auditory hallucinations or visual hallucinations.  The patient does not endorse any significant symptoms consistent with oskar or psychosis at today's encounter.      Prior Psychiatric Medications:  -Lexapro - decreased her libido so she stopped taking it (can't remember if it helped her mood or not)  -Prozac  -Wellbutrin  -Hydroxyzine  -Lamictal  -Prazosin  -Trintellix      Last Menstrual Period:  9/29/22.  The patient was educated that her prescribed medications can have potential risk to a developing fetus. The patient is advised to contact this APRN/this office if she becomes pregnant or plans to become pregnant.  Pt verbalizes understanding and acknowledged agreement with this plan in her own words.        The following portions of the patient's history were reviewed and updated as appropriate: allergies, current medications, past family history, past medical history, past social history, past surgical history and problem list.            Past Medical History:  Past Medical History:   Diagnosis Date   • Anemia    • Anxiety    • COVID  2022   • Depression    • Vitamin B12 deficiency        Social History:  Social History     Socioeconomic History   • Marital status: Single   • Number of children: 0   Tobacco Use   • Smoking status: Former Smoker     Packs/day: 0.20     Years: 0.25     Pack years: 0.05     Types: Cigarettes     Start date: 2020     Quit date: 8/15/2020     Years since quittin.1   • Smokeless tobacco: Never Used   • Tobacco comment: smoked for 5 years  (black and milds for 5 years then restarted 2020 and quit 2020   Vaping Use   • Vaping Use: Never used   Substance and Sexual Activity   • Alcohol use: Yes     Comment: Only occasionally, states not frequently   • Drug use: Not Currently     Comment: The patient states in her past she has used marijuanna, but not currently   • Sexual activity: Yes     Partners: Male     Birth control/protection: OCP       Family History:  Family History   Adopted: Yes       Past Surgical History:  Past Surgical History:   Procedure Laterality Date   • NO PAST SURGERIES         Problem List:  Patient Active Problem List   Diagnosis   • Iron deficiency anemia secondary to inadequate dietary iron intake   • B12 deficiency   • Anxiety   • BV (bacterial vaginosis)   • Allergic rhinitis   • Vitiligo   • Generalized anxiety disorder   • Moderate episode of recurrent major depressive disorder (HCC)   • Restless leg   • Excessive sweating       Allergy:   No Known Allergies     Current Medications:   Current Outpatient Medications   Medication Sig Dispense Refill   • hydrOXYzine (ATARAX) 25 MG tablet Take 1 tablet by mouth At Night As Needed (anxiety and/or sleep). 30 tablet 0   • lamoTRIgine (LaMICtal) 25 MG tablet Take 2 tablets by mouth Daily. 60 tablet 0   • prazosin (MINIPRESS) 2 MG capsule Take 1 capsule by mouth Every Night. 30 capsule 0   • aluminum chloride (DRYSOL) 20 % external solution Apply  topically to the appropriate area as directed Every Night. 60 mL 1   • drospirenone-ethinyl  estradiol (TICO,OCELLA) 3-0.03 MG per tablet Take 1 tablet by mouth Daily. 28 tablet 0   • fluticasone (FLONASE) 50 MCG/ACT nasal spray 2 sprays by Each Nare route Daily. 16 g 3   • Vortioxetine HBr (Trintellix) 10 MG tablet tablet Take 10 mg by mouth Daily With Breakfast. 30 tablet 0     No current facility-administered medications for this visit.       Review of Symptoms:    Review of Systems   Psychiatric/Behavioral: Positive for decreased concentration, sleep disturbance, depressed mood and stress. Negative for agitation, behavioral problems, dysphoric mood, hallucinations, self-injury, suicidal ideas and negative for hyperactivity. The patient is nervous/anxious.          Physical Exam:   not currently breastfeeding. There is no height or weight on file to calculate BMI.   Due to the remote nature of this encounter (virtual encounter), vitals were unable to be obtained.  Height stated at 66.5 inches.  Weight stated around 170-176 pounds.      Physical Exam  Constitutional:       Appearance: Normal appearance.   Neurological:      Mental Status: She is alert and oriented to person, place, and time.   Psychiatric:         Attention and Perception: Attention normal. She is attentive.         Mood and Affect: Affect normal. Mood is anxious and depressed.         Speech: Speech normal.         Behavior: Behavior normal. Behavior is cooperative.         Thought Content: Thought content normal. Thought content is not paranoid or delusional. Thought content does not include homicidal or suicidal ideation. Thought content does not include homicidal or suicidal plan.         Cognition and Memory: Cognition and memory normal.         Judgment: Judgment normal. Judgment is not impulsive or inappropriate.         Mental Status Exam:   Hygiene:   good  Cooperation:  Cooperative  Eye Contact:  Good  Psychomotor Behavior:  Appropriate  Affect:  Appropriate  Mood: depressed and anxious  Hopelessness: Denies  Speech:   Normal  Thought Process:  Linear  Thought Content:  Mood congruent  Suicidal:  None  Homicidal:  None  Hallucinations:  None  Delusion:  None  Memory:  Intact  Orientation:  Person, Place, Time and Situation  Reliability:  good  Insight:  Good  Judgement:  Good  Impulse Control:  Good  Physical/Medical Issues:  No          Lab Results:   No visits with results within 1 Month(s) from this visit.   Latest known visit with results is:   Lab on 08/01/2022   Component Date Value Ref Range Status   • WBC 08/01/2022 7.23  3.40 - 10.80 10*3/mm3 Final   • RBC 08/01/2022 5.05  3.77 - 5.28 10*6/mm3 Final   • Hemoglobin 08/01/2022 12.2  12.0 - 15.9 g/dL Final   • Hematocrit 08/01/2022 37.6  34.0 - 46.6 % Final   • MCV 08/01/2022 74.5 (A) 79.0 - 97.0 fL Final   • MCH 08/01/2022 24.2 (A) 26.6 - 33.0 pg Final   • MCHC 08/01/2022 32.4  31.5 - 35.7 g/dL Final   • RDW 08/01/2022 14.4  12.3 - 15.4 % Final   • RDW-SD 08/01/2022 38.4  37.0 - 54.0 fl Final   • MPV 08/01/2022 10.6  6.0 - 12.0 fL Final   • Platelets 08/01/2022 256  140 - 450 10*3/mm3 Final   • Ferritin 08/01/2022 28.70  13.00 - 150.00 ng/mL Final   • Vitamin B-12 08/01/2022 686  211 - 946 pg/mL Final         Assessment & Plan   Problems Addressed this Visit    None     Visit Diagnoses     Major depressive disorder, recurrent episode, moderate (HCC)  (Chronic)   -  Primary    R/O BPD    Relevant Medications    Vortioxetine HBr (Trintellix) 10 MG tablet tablet    lamoTRIgine (LaMICtal) 25 MG tablet    hydrOXYzine (ATARAX) 25 MG tablet    Anxiety disorder, unspecified type  (Chronic)       Relevant Medications    Vortioxetine HBr (Trintellix) 10 MG tablet tablet    hydrOXYzine (ATARAX) 25 MG tablet    Nightmares        Relevant Medications    Vortioxetine HBr (Trintellix) 10 MG tablet tablet    prazosin (MINIPRESS) 2 MG capsule    hydrOXYzine (ATARAX) 25 MG tablet    Sleeping difficulties        Relevant Medications    hydrOXYzine (ATARAX) 25 MG tablet      Diagnoses        Codes Comments    Major depressive disorder, recurrent episode, moderate (HCC)    -  Primary ICD-10-CM: F33.1  ICD-9-CM: 296.32 R/O BPD    Anxiety disorder, unspecified type     ICD-10-CM: F41.9  ICD-9-CM: 300.00     Nightmares     ICD-10-CM: F51.5  ICD-9-CM: 307.47     Sleeping difficulties     ICD-10-CM: G47.9  ICD-9-CM: 780.50           Visit Diagnoses:    ICD-10-CM ICD-9-CM   1. Major depressive disorder, recurrent episode, moderate (HCC)  F33.1 296.32   2. Anxiety disorder, unspecified type  F41.9 300.00   3. Nightmares  F51.5 307.47   4. Sleeping difficulties  G47.9 780.50          GOALS:  Short Term Goals: Patient will be compliant with medication, and patient will have no significant medication related side effects.  Patient will be engaged in psychotherapy as indicated.  Patient will report subjective improvement of symptoms.  Long term goals: To stabilize mood and treat/improve subjective symptoms, the patient will stay out of the hospital, the patient will be at an optimal level of functioning, and the patient will take all medications as prescribed.  The patient verbalized understanding and agreement with goals that were mutually set.      TREATMENT PLAN: Continue supportive psychotherapy efforts and take medications as indicated.  Medication and treatment options, both pharmacological and non-pharmacological treatment options, discussed during today's visit, including any off label use of medication. Patient acknowledged and verbally consented with current treatment plan and was educated on the importance of compliance with treatment and follow-up appointments.      -Continue hydroxyzine 25 mg by mouth once daily at bedtime as needed for sleep and/or anxiety.  -Continue lamotrigine 50 mg by mouth once daily for mood.  -Continue Prazosin 2 mg by mouth once nightly at bedtime for nightmares.  -Increase Trintellix to 10 mg by mouth once daily in the morning with breakfast for moods.      MEDICATION  ISSUES:  Discussed medication options and treatment plan of prescribed medication, any off label use of medication, as well as the risks, benefits, any black box warnings including increased suicidality, and side effects including but not limited to potential falls, dizziness, possible impaired driving, GI side effects (change in appetite, abdominal discomfort, nausea, vomiting, diarrhea, and/or constipation), dry mouth, somnolence, sedation, insomnia, activation, agitation, irritation, tremors, abnormal muscle movements or disorders, headache, sweating, possible bruising or rare bleeding, electrolyte and/or fluid abnormalities, change in blood pressure/heart rate/and or heart rhythm, sexual dysfunction, and metabolic adversities among others. Patient and/or guardian agreeable to call the office with any worsening of symptoms or onset of side effects, or if any concerns or questions arise.  The contact information for the office is made available to the patient and/or guardian.  Patient and/or guardian agreeable to call 911 or go to the nearest ER should they begin having any SI/HI, or if any urgent concerns arise. No medication side effects or related complaints today.    This APRN has discussed the benefits and risks of taking/continuing Lamictal (Lamotrigine).  The side effects of Lamictal can include a benign rash, blurred or double vision, dizziness, ataxia, sedation, headache, tremor, insomnia, poor coordination, fatigue,  nausea, vomiting, dyspepsia, rhinitis, infection, pharyngitis, asthenia, a rare but serious rash, rare multi-organ failure associated with Santana-Jean Syndrome, toxic epidermal necrolysis, drug hypersensitivity syndrome, rare blood dyscrasias, rare aseptic meningitis, rare sudden unexplained deaths in people with epilepsy, withdrawal seizures upon abrupt withdrawal, and rare activation of suicidal ideation and behavior (suicidality).  This APRN has discussed that a very slow dose  titration when starting, or changing doses, of Lamictal may reduce the incidence of skin rash and other side effects.  The dosage should not be titrated upwards or increased faster than recommended due to the possibility of the discussed side effects and risk of development of a skin rash (which can become life threatening).    This APRN has also discussed that if the patient stops taking the Lamictal for 3-5 days or longer, it will be necessary to restart the drug with an initial dose titration, as rashes have been reported on reexposure.  If the patient and Provider decide to stop the Lamictal, the patient will follow the directions of this APRN/this office as a guided taper over about two weeks is appropriate due to the risk of relapse in bipolar disorder with those with a mood or bipolar disorder, the risk of seizures in those with epilepsy, and discontinuation symptoms upon rapid discontinuation of Lamictal.    The patient verbalizes understanding of benefits and risks as discussed, the patient/guardian feels the benefits outweigh the risks and is agreeable to continue/take Lamictal as discussed.  The patient is advised should any side effects or rash develops they are to stop the Lamictal immediately and contact this APRN/this office or go to the emergency department immediately.  The patient verbalizes understanding and agreement with treatment plan in their own words.      SUICIDE RISK ASSESSMENT AND SAFETY PLAN: Unalterable demographics and a history of mental health intervention indicate this patient is in a high risk category compared to the general population. At present, the patient denies active SI/HI, intentions, or plans at this time and agrees to seek immediate care should such thoughts develop. The patient verbalizes understanding of how to access emergency care if needed and agrees to do so. Consideration of suicide risk and protective factors such as history, current presentation, individual  strengths and weaknesses, psychosocial and environmental stressors and variables, psychiatric illness and symptoms, medical conditions and pain, took place in this interview. Based on those considerations, the patient is determined: within individual baseline and presenting no imminent risk for suicide or homicide. Other recommendations: The patient does not meet the criteria for inpatient admission and is not a safety risk to self or others at today's visit. Inpatient treatment offers no significant advantages over outpatient treatment for this patient at today's visit.  The patient was given ample time for questions and fully participated in treatment planning.  The patient was encouraged to call the clinic with any questions or concerns.  The patient was informed of access to emergency care. If patient were to develop any significant symptomatology, suicidal ideation, homicidal ideation, any concerns, or feel unsafe at any time they are to call the clinic and if unable to get immediate assistance should immediately call 911 or go to the nearest emergency room.  Patient contracted verbally for the following: If you are experiencing an emotional crisis or have thoughts of harming yourself or others, please go to your nearest local emergency room or call 911. Will continue to re-assess medication response and side effects frequently to establish efficacy and ensure safety. Risks, any black box warnings, side effects, off label usage, and benefits of medication and treatment discussed with patient, along with potential adverse side effects of current and/or newly prescribed medication, alternative treatment options, and OTC medications.  Patient verbalized understanding of potential risks, any off label use of medication, any black box warnings, and any side effects in their own words. The patient verbalized understanding and agreed to comply with the safety plan discussed in their own words.  Patient given the  number to the office. Number also discussed of the 24- hour suicide hotline.           MEDS ORDERED DURING VISIT:  New Medications Ordered This Visit   Medications   • Vortioxetine HBr (Trintellix) 10 MG tablet tablet     Sig: Take 10 mg by mouth Daily With Breakfast.     Dispense:  30 tablet     Refill:  0   • prazosin (MINIPRESS) 2 MG capsule     Sig: Take 1 capsule by mouth Every Night.     Dispense:  30 capsule     Refill:  0   • lamoTRIgine (LaMICtal) 25 MG tablet     Sig: Take 2 tablets by mouth Daily.     Dispense:  60 tablet     Refill:  0   • hydrOXYzine (ATARAX) 25 MG tablet     Sig: Take 1 tablet by mouth At Night As Needed (anxiety and/or sleep).     Dispense:  30 tablet     Refill:  0       Return in about 4 weeks (around 10/27/2022), or if symptoms worsen or fail to improve, for Next scheduled follow up and Recheck.           Functional Status: Moderate impairment     Prognosis: Fair with Ongoing Treatment             This document has been electronically signed by ARVIN Tamez  September 29, 2022 16:07 EDT    Some of the data in this electronic note has been brought forward from a previous encounter, any necessary changes have been made, it has been reviewed by this APRN, and it is accurate.    Please note that portions of this note were completed with a voice recognition program.

## 2022-10-10 ENCOUNTER — TELEMEDICINE (OUTPATIENT)
Dept: PSYCHIATRY | Facility: CLINIC | Age: 27
End: 2022-10-10

## 2022-10-10 DIAGNOSIS — F33.1 MODERATE EPISODE OF RECURRENT MAJOR DEPRESSIVE DISORDER: ICD-10-CM

## 2022-10-10 DIAGNOSIS — F41.1 GENERALIZED ANXIETY DISORDER: Primary | ICD-10-CM

## 2022-10-10 PROCEDURE — 90834 PSYTX W PT 45 MINUTES: CPT | Performed by: COUNSELOR

## 2022-10-10 NOTE — PROGRESS NOTES
Date: October 30, 2022  Time In: 1:58 PM   Time Out: 2:46 PM  This provider is located at the [facility] ([facility]), [address], [place] [address] using a secure [email or web address] Video Visit through [employer]. Patient is being seen remotely via telehealth at home address in [place] and stated they are in a secure environment for this session. The patient's condition being diagnosed/treated is appropriate for telemedicine. The provider identified herself as well as her credentials. The patient, and/or patients guardian, consent to be seen remotely, and when consent is given they understand that the consent allows for patient identifiable information to be sent to a third party as needed. They may refuse to be seen remotely at any time. The electronic data is encrypted and password protected, and the patient and/or guardian has been advised of the potential risks to privacy not withstanding such measures.     You have chosen to receive care through a telehealth visit.  Do you consent to use a video/audio connection for your medical care today? Yes    PROGRESS NOTE  Data:  Maddy Chisholm is a 27 y.o. female who presents today for follow up. Patient states that her parents are still in town and that she has been able to have a nice visit with her boyfriend and parents. Patient states that she is still struggling at work and can't seem to make friends there. Patient states that she is still struggling with doing everything correctly at work and feels that she is still talked about by her co-workers. Patient states that she has not reported any issues that she has seen at work that involve her co-workers but they seem to want to tell on her for all of her mistakes. Patient states that she is stressed about her work but she is also concerned about the relationship with her boyfriend due to his sexual orientation and what that will mean for their relationship in the  long term. Patient states that she feels that she will not be able to maintain the relationship with her boyfriend pursues his stated sexual orientation. Patient is concerned about what else will happen if she loses her relationship with her boyfriend as she lives with him. Patient states that she is interested in couples counseling to see if this will help the situation with her boyfriend; discussed how her boyfriend will also have to be willing to participate in sessions and that he may need to do some individual work before he will be able to work on their relationship.    Chief Complaint: anxiety, depression,     History of Present Illness: patient has struggled with anxiety and depression for several years      Clinical Maneuvering/Intervention: CBT    (Scales based on 0 - 10 with 10 being the worst)  Depression: 6 Anxiety: 8       Assisted patient in processing above session content; acknowledged and normalized patient’s thoughts, feelings, and concerns.  Rationalized patient thought process regarding work and relationship concerns.  Discussed triggers associated with patient's feelings of anxiety and depression such as concerns about her relationships with her boyfriend and work relationships.  Also discussed coping skills for patient to implement such as encouraging her boyfriend to be more open about his thoughts, talking with a supervisor at work about her concerns and working on a plan for herself in case her relationship status doesn't improve so that she will not be overly anxious if the relationship is not maintained.    Allowed patient to freely discuss issues without interruption or judgment. Provided safe, confidential environment to facilitate the development of positive therapeutic relationship and encourage open, honest communication. Assisted patient in identifying risk factors which would indicate the need for higher level of care including thoughts to harm self or others and/or self-harming  behavior and encouraged patient to contact this office, call 911, or present to the nearest emergency room should any of these events occur. Discussed crisis intervention services and means to access. Patient adamantly and convincingly denies current suicidal or homicidal ideation or perceptual disturbance.    Assessment:   Assessment   Patient appears to maintain relative stability as compared to their baseline.  However, patient continues to struggle with feelings of anxiety and depression which continues to cause impairment in important areas of functioning.  A result, they can be reasonably expected to continue to benefit from treatment and would likely be at increased risk for decompensation otherwise.    Mental Status Exam:   Hygiene:   good  Cooperation:  Cooperative  Eye Contact:  Good  Psychomotor Behavior:  Appropriate  Affect:  Appropriate  Mood: neutral   Speech:  Normal  Thought Process:  Goal directed  Thought Content:  Normal  Suicidal:  None  Homicidal:  None  Hallucinations:  None  Delusion:  None  Memory:  Intact  Orientation:  Person, Place, Time and Situation  Reliability:  good  Insight:  Fair  Judgement:  Good  Impulse Control:  Good  Physical/Medical Issues:  No        Patient's Support Network Includes:  significant other, parents and extended family    Functional Status: Moderate impairment     Progress toward goal: Not at goal    Prognosis: Good with Ongoing Treatment          Plan:    Patient will continue in individual outpatient therapy with focus on improved functioning and coping skills, maintaining stability, and avoiding decompensation and the need for higher level of care.    Patient will adhere to medication regimen as prescribed and report any side effects. Patient will contact this office, call 911 or present to the nearest emergency room should suicidal or homicidal ideations occur. Provide Cognitive Behavioral Therapy and Solution Focused Therapy to improve functioning,  maintain stability, and avoid decompensation and the need for higher level of care.     Return in about 4 weeks, or earlier if symptoms worsen or fail to improve.           VISIT DIAGNOSIS:     ICD-10-CM ICD-9-CM   1. Generalized anxiety disorder  F41.1 300.02   2. Moderate episode of recurrent major depressive disorder (HCC)  F33.1 296.32             This document has been electronically signed by PJ Toussaint  October 30, 2022 14:12 EDT      Part of this note may be an electronic transcription/translation of spoken language to printed text using the Dragon Dictation System.

## 2022-10-26 DIAGNOSIS — F51.5 NIGHTMARES: ICD-10-CM

## 2022-10-26 DIAGNOSIS — F33.1 MAJOR DEPRESSIVE DISORDER, RECURRENT EPISODE, MODERATE: Chronic | ICD-10-CM

## 2022-10-26 DIAGNOSIS — F41.9 ANXIETY DISORDER, UNSPECIFIED TYPE: Chronic | ICD-10-CM

## 2022-10-26 DIAGNOSIS — Z30.41 ENCOUNTER FOR SURVEILLANCE OF CONTRACEPTIVE PILLS: ICD-10-CM

## 2022-10-26 RX ORDER — PRAZOSIN HYDROCHLORIDE 2 MG/1
2 CAPSULE ORAL NIGHTLY
Qty: 30 CAPSULE | Refills: 0 | Status: SHIPPED | OUTPATIENT
Start: 2022-10-26 | End: 2022-10-27 | Stop reason: SDUPTHER

## 2022-10-26 RX ORDER — DROSPIRENONE AND ETHINYL ESTRADIOL 0.03MG-3MG
1 KIT ORAL DAILY
Qty: 28 TABLET | Refills: 0 | Status: CANCELLED | OUTPATIENT
Start: 2022-10-26

## 2022-10-27 ENCOUNTER — TELEMEDICINE (OUTPATIENT)
Dept: PSYCHIATRY | Facility: CLINIC | Age: 27
End: 2022-10-27

## 2022-10-27 DIAGNOSIS — F33.1 MAJOR DEPRESSIVE DISORDER, RECURRENT EPISODE, MODERATE: Primary | Chronic | ICD-10-CM

## 2022-10-27 DIAGNOSIS — F51.5 NIGHTMARES: ICD-10-CM

## 2022-10-27 DIAGNOSIS — G47.9 SLEEPING DIFFICULTIES: ICD-10-CM

## 2022-10-27 DIAGNOSIS — F41.9 ANXIETY DISORDER, UNSPECIFIED TYPE: Chronic | ICD-10-CM

## 2022-10-27 PROCEDURE — 99214 OFFICE O/P EST MOD 30 MIN: CPT | Performed by: NURSE PRACTITIONER

## 2022-10-27 RX ORDER — PRAZOSIN HYDROCHLORIDE 2 MG/1
2 CAPSULE ORAL NIGHTLY
Qty: 30 CAPSULE | Refills: 0 | Status: SHIPPED | OUTPATIENT
Start: 2022-10-27 | End: 2022-11-29 | Stop reason: SDUPTHER

## 2022-10-27 RX ORDER — LAMOTRIGINE 25 MG/1
50 TABLET ORAL DAILY
Qty: 60 TABLET | Refills: 0 | Status: SHIPPED | OUTPATIENT
Start: 2022-10-27 | End: 2022-11-29 | Stop reason: SDUPTHER

## 2022-10-27 RX ORDER — DROSPIRENONE AND ETHINYL ESTRADIOL 0.03MG-3MG
1 KIT ORAL DAILY
Qty: 30 TABLET | Refills: 5 | Status: SHIPPED | OUTPATIENT
Start: 2022-10-27

## 2022-10-27 RX ORDER — HYDROXYZINE HYDROCHLORIDE 25 MG/1
25 TABLET, FILM COATED ORAL NIGHTLY PRN
Qty: 30 TABLET | Refills: 0 | Status: SHIPPED | OUTPATIENT
Start: 2022-10-27 | End: 2022-11-29 | Stop reason: SDUPTHER

## 2022-10-27 NOTE — PROGRESS NOTES
"This provider is located at the [facility] (through [facility]), [address], [facility], [address] using a secure THE [employer] Video Visit through [employer]. Patient is being seen remotely via telehealth at their home address in [place], and stated they are in a secure environment for this session. The patient's condition being diagnosed/treated is appropriate for telemedicine. The provider identified herself as well as her credentials.   The patient, and/or patients guardian, consent to be seen remotely, and when consent is given they understand that the consent allows for patient identifiable information to be sent to a third party as needed.   They may refuse to be seen remotely at any time. The electronic data is encrypted and password protected, and the patient and/or guardian has been advised of the potential risks to privacy not withstanding such measures.    You have chosen to receive care through a telehealth visit.  Do you consent to use a video/audio connection for your medical care today? Yes    Subjective   Maddy Chisholm is a 27 y.o. female who presents today for follow up    Chief Complaint: Depression, anxiety, sleeping difficulties, and nightmares follow-up    Accompanied by: The patient is alone at today's encounter    History of Present Illness:   The patient describes her mood as \"okay\" over the last few weeks.  The patient states she thinks she may be more depressed.  The patient states even though she has medicines and therapy in place she still has a lack of motivation and tends to lay down and not do what she needs to be doing.  The patient reports her parents did return to [place] recently, and she was happy to be able to visit with them while they were in the states.  The patient rates her symptoms of depression at a 7.5/10 on a 0-10 scale, with 10 being the worst.  The patient reports her symptoms of depression as feeling down and having no " motivation.  The patient rates her symptoms of anxiety at a 6/10 on a 0-10 scale, with 10 being the worst.  The patient states she really does not feel anxious anymore, and depression is her biggest complaint with her moods.  The patient reports her appetite as good.  The patient reports her sleep as good with the use of her current medications.  The patient does not report any recent nightmares.  The patient reports averaging around 7 or more hours of sleep each night.  The patient denies any new medical problems or changes in medications since last appointment with this facility.  The patient reports compliance with her current medication regimen.  The patient denies any side effects or concerns from her current medication regimen.   The patient would like to adjust her medications at this visit to help with her worsened depressive symptoms.  The patient denies any suicidal or homicidal ideations, plans, or intent at today's encounter and is convincing.  The patient denies any auditory hallucinations or visual hallucinations.  The patient does not endorse any significant symptoms consistent with oskar or psychosis at today's encounter.      Prior Psychiatric Medications:  -Lexapro - decreased her libido so she stopped taking it (can't remember if it helped her mood or not)  -Prozac  -Wellbutrin  -Hydroxyzine  -Lamictal  -Prazosin  -Trintellix      Last Menstrual Period:  10/27/22.  The patient was educated that her prescribed medications can have potential risk to a developing fetus. The patient is advised to contact this APRN/this office if she becomes pregnant or plans to become pregnant.  Pt verbalizes understanding and acknowledged agreement with this plan in her own words.        The following portions of the patient's history were reviewed and updated as appropriate: allergies, current medications, past family history, past medical history, past social history, past surgical history and problem  list.            Past Medical History:  Past Medical History:   Diagnosis Date   • Anemia    • Anxiety    • COVID 2022   • Depression    • Vitamin B12 deficiency        Social History:  Social History     Socioeconomic History   • Marital status: Single   • Number of children: 0   Tobacco Use   • Smoking status: Former     Packs/day: 0.20     Years: 0.25     Pack years: 0.05     Types: Cigarettes     Start date: 2020     Quit date: 8/15/2020     Years since quittin.2   • Smokeless tobacco: Never   • Tobacco comments:     smoked for 5 years  (black and milds for 5 years then restarted 2020 and quit 2020   Vaping Use   • Vaping Use: Never used   Substance and Sexual Activity   • Alcohol use: Yes     Comment: Only occasionally, states not frequently   • Drug use: Not Currently     Comment: The patient states in her past she has used marijuanna, but not currently   • Sexual activity: Yes     Partners: Male     Birth control/protection: OCP       Family History:  Family History   Adopted: Yes       Past Surgical History:  Past Surgical History:   Procedure Laterality Date   • NO PAST SURGERIES         Problem List:  Patient Active Problem List   Diagnosis   • Iron deficiency anemia secondary to inadequate dietary iron intake   • B12 deficiency   • Anxiety   • BV (bacterial vaginosis)   • Allergic rhinitis   • Vitiligo   • Generalized anxiety disorder   • Moderate episode of recurrent major depressive disorder (HCC)   • Restless leg   • Excessive sweating       Allergy:   No Known Allergies     Current Medications:   Current Outpatient Medications   Medication Sig Dispense Refill   • hydrOXYzine (ATARAX) 25 MG tablet Take 1 tablet by mouth At Night As Needed (anxiety and/or sleep). 30 tablet 0   • lamoTRIgine (LaMICtal) 25 MG tablet Take 2 tablets by mouth Daily. 60 tablet 0   • prazosin (MINIPRESS) 2 MG capsule Take 1 capsule by mouth Every Night. 30 capsule 0   • Vortioxetine HBr (Trintellix) 20 MG tablet  Take 1 tablet by mouth Daily With Breakfast. 30 tablet 0   • aluminum chloride (DRYSOL) 20 % external solution Apply  topically to the appropriate area as directed Every Night. 60 mL 1   • drospirenone-ethinyl estradiol (TICO,OCELLA) 3-0.03 MG per tablet Take 1 tablet by mouth Daily. 30 tablet 5   • fluticasone (FLONASE) 50 MCG/ACT nasal spray 2 sprays by Each Nare route Daily. 16 g 3     No current facility-administered medications for this visit.       Review of Symptoms:    Review of Systems   Psychiatric/Behavioral: Positive for decreased concentration, depressed mood and stress. Negative for agitation, behavioral problems, dysphoric mood, hallucinations, self-injury, suicidal ideas and negative for hyperactivity. Sleep disturbance: improved with medication. The patient is nervous/anxious.          Physical Exam:   not currently breastfeeding. There is no height or weight on file to calculate BMI.   Due to the remote nature of this encounter (virtual encounter), vitals were unable to be obtained.  Height stated at 66.5 inches.  Weight stated around 170-176 pounds.      Physical Exam  Constitutional:       Appearance: Normal appearance.   Neurological:      Mental Status: She is alert and oriented to person, place, and time.   Psychiatric:         Attention and Perception: Attention normal. She is attentive.         Mood and Affect: Affect normal. Mood is anxious and depressed.         Speech: Speech normal.         Behavior: Behavior normal. Behavior is cooperative.         Thought Content: Thought content normal. Thought content is not paranoid or delusional. Thought content does not include homicidal or suicidal ideation. Thought content does not include homicidal or suicidal plan.         Cognition and Memory: Cognition and memory normal.         Judgment: Judgment normal. Judgment is not impulsive or inappropriate.         Mental Status Exam:   Hygiene:   good  Cooperation:  Cooperative  Eye Contact:   Good  Psychomotor Behavior:  Appropriate  Affect:  Appropriate  Mood: depressed and anxious  Hopelessness: Denies  Speech:  Normal  Thought Process:  Linear  Thought Content:  Mood congruent  Suicidal:  None  Homicidal:  None  Hallucinations:  None  Delusion:  None  Memory:  Intact  Orientation:  Person, Place, Time and Situation  Reliability:  good  Insight:  Good  Judgement:  Good  Impulse Control:  Good  Physical/Medical Issues:  No          Lab Results:   No visits with results within 1 Month(s) from this visit.   Latest known visit with results is:   Lab on 08/01/2022   Component Date Value Ref Range Status   • WBC 08/01/2022 7.23  3.40 - 10.80 10*3/mm3 Final   • RBC 08/01/2022 5.05  3.77 - 5.28 10*6/mm3 Final   • Hemoglobin 08/01/2022 12.2  12.0 - 15.9 g/dL Final   • Hematocrit 08/01/2022 37.6  34.0 - 46.6 % Final   • MCV 08/01/2022 74.5 (L)  79.0 - 97.0 fL Final   • MCH 08/01/2022 24.2 (L)  26.6 - 33.0 pg Final   • MCHC 08/01/2022 32.4  31.5 - 35.7 g/dL Final   • RDW 08/01/2022 14.4  12.3 - 15.4 % Final   • RDW-SD 08/01/2022 38.4  37.0 - 54.0 fl Final   • MPV 08/01/2022 10.6  6.0 - 12.0 fL Final   • Platelets 08/01/2022 256  140 - 450 10*3/mm3 Final   • Ferritin 08/01/2022 28.70  13.00 - 150.00 ng/mL Final   • Vitamin B-12 08/01/2022 686  211 - 946 pg/mL Final         Assessment & Plan   Problems Addressed this Visit    None  Visit Diagnoses     Major depressive disorder, recurrent episode, moderate (HCC)  (Chronic)   -  Primary    R/O BPD R/O Mood d/o    Relevant Medications    hydrOXYzine (ATARAX) 25 MG tablet    lamoTRIgine (LaMICtal) 25 MG tablet    Vortioxetine HBr (Trintellix) 20 MG tablet    Anxiety disorder, unspecified type  (Chronic)       Relevant Medications    hydrOXYzine (ATARAX) 25 MG tablet    Vortioxetine HBr (Trintellix) 20 MG tablet    Sleeping difficulties        Relevant Medications    hydrOXYzine (ATARAX) 25 MG tablet    Nightmares        Relevant Medications    hydrOXYzine (ATARAX) 25  MG tablet    prazosin (MINIPRESS) 2 MG capsule    Vortioxetine HBr (Trintellix) 20 MG tablet      Diagnoses       Codes Comments    Major depressive disorder, recurrent episode, moderate (HCC)    -  Primary ICD-10-CM: F33.1  ICD-9-CM: 296.32 R/O BPD R/O Mood d/o    Anxiety disorder, unspecified type     ICD-10-CM: F41.9  ICD-9-CM: 300.00     Sleeping difficulties     ICD-10-CM: G47.9  ICD-9-CM: 780.50     Nightmares     ICD-10-CM: F51.5  ICD-9-CM: 307.47           Visit Diagnoses:    ICD-10-CM ICD-9-CM   1. Major depressive disorder, recurrent episode, moderate (HCC)  F33.1 296.32   2. Anxiety disorder, unspecified type  F41.9 300.00   3. Sleeping difficulties  G47.9 780.50   4. Nightmares  F51.5 307.47          GOALS:  Short Term Goals: Patient will be compliant with medication, and patient will have no significant medication related side effects.  Patient will be engaged in psychotherapy as indicated.  Patient will report subjective improvement of symptoms.  Long term goals: To stabilize mood and treat/improve subjective symptoms, the patient will stay out of the hospital, the patient will be at an optimal level of functioning, and the patient will take all medications as prescribed.  The patient verbalized understanding and agreement with goals that were mutually set.      TREATMENT PLAN: Continue supportive psychotherapy efforts and take medications as indicated.  Medication and treatment options, both pharmacological and non-pharmacological treatment options, discussed during today's visit, including any off label use of medication. Patient acknowledged and verbally consented with current treatment plan and was educated on the importance of compliance with treatment and follow-up appointments.      -Continue hydroxyzine 25 mg by mouth once daily at bedtime as needed for sleep and/or anxiety.  -Continue lamotrigine 50 mg by mouth once daily for mood.  -Continue Prazosin 2 mg by mouth once nightly at bedtime for  nightmares.  -Increase Trintellix to 20 mg by mouth once daily in the morning with breakfast for moods.      MEDICATION ISSUES:  Discussed medication options and treatment plan of prescribed medication, any off label use of medication, as well as the risks, benefits, any black box warnings including increased suicidality, and side effects including but not limited to potential falls, dizziness, possible impaired driving, GI side effects (change in appetite, abdominal discomfort, nausea, vomiting, diarrhea, and/or constipation), dry mouth, somnolence, sedation, insomnia, activation, agitation, irritation, tremors, abnormal muscle movements or disorders, headache, sweating, possible bruising or rare bleeding, electrolyte and/or fluid abnormalities, change in blood pressure/heart rate/and or heart rhythm, sexual dysfunction, and metabolic adversities among others. Patient and/or guardian agreeable to call the office with any worsening of symptoms or onset of side effects, or if any concerns or questions arise.  The contact information for the office is made available to the patient and/or guardian.  Patient and/or guardian agreeable to call 911 or go to the nearest ER should they begin having any SI/HI, or if any urgent concerns arise. No medication side effects or related complaints today.    This APRN has discussed the benefits and risks of taking/continuing Lamictal (Lamotrigine).  The side effects of Lamictal can include a benign rash, blurred or double vision, dizziness, ataxia, sedation, headache, tremor, insomnia, poor coordination, fatigue,  nausea, vomiting, dyspepsia, rhinitis, infection, pharyngitis, asthenia, a rare but serious rash, rare multi-organ failure associated with Santana-Jean Syndrome, toxic epidermal necrolysis, drug hypersensitivity syndrome, rare blood dyscrasias, rare aseptic meningitis, rare sudden unexplained deaths in people with epilepsy, withdrawal seizures upon abrupt withdrawal, and  rare activation of suicidal ideation and behavior (suicidality).  This APRN has discussed that a very slow dose titration when starting, or changing doses, of Lamictal may reduce the incidence of skin rash and other side effects.  The dosage should not be titrated upwards or increased faster than recommended due to the possibility of the discussed side effects and risk of development of a skin rash (which can become life threatening).    This APRN has also discussed that if the patient stops taking the Lamictal for 3-5 days or longer, it will be necessary to restart the drug with an initial dose titration, as rashes have been reported on reexposure.  If the patient and Provider decide to stop the Lamictal, the patient will follow the directions of this APRN/this office as a guided taper over about two weeks is appropriate due to the risk of relapse in bipolar disorder with those with a mood or bipolar disorder, the risk of seizures in those with epilepsy, and discontinuation symptoms upon rapid discontinuation of Lamictal.    The patient verbalizes understanding of benefits and risks as discussed, the patient/guardian feels the benefits outweigh the risks and is agreeable to continue/take Lamictal as discussed.  The patient is advised should any side effects or rash develops they are to stop the Lamictal immediately and contact this APRN/this office or go to the emergency department immediately.  The patient verbalizes understanding and agreement with treatment plan in their own words.      SUICIDE RISK ASSESSMENT AND SAFETY PLAN: Unalterable demographics and a history of mental health intervention indicate this patient is in a high risk category compared to the general population. At present, the patient denies active SI/HI, intentions, or plans at this time and agrees to seek immediate care should such thoughts develop. The patient verbalizes understanding of how to access emergency care if needed and agrees to do  so. Consideration of suicide risk and protective factors such as history, current presentation, individual strengths and weaknesses, psychosocial and environmental stressors and variables, psychiatric illness and symptoms, medical conditions and pain, took place in this interview. Based on those considerations, the patient is determined: within individual baseline and presenting no imminent risk for suicide or homicide. Other recommendations: The patient does not meet the criteria for inpatient admission and is not a safety risk to self or others at today's visit. Inpatient treatment offers no significant advantages over outpatient treatment for this patient at today's visit.  The patient was given ample time for questions and fully participated in treatment planning.  The patient was encouraged to call the clinic with any questions or concerns.  The patient was informed of access to emergency care. If patient were to develop any significant symptomatology, suicidal ideation, homicidal ideation, any concerns, or feel unsafe at any time they are to call the clinic and if unable to get immediate assistance should immediately call 911 or go to the nearest emergency room.  Patient contracted verbally for the following: If you are experiencing an emotional crisis or have thoughts of harming yourself or others, please go to your nearest local emergency room or call 911. Will continue to re-assess medication response and side effects frequently to establish efficacy and ensure safety. Risks, any black box warnings, side effects, off label usage, and benefits of medication and treatment discussed with patient, along with potential adverse side effects of current and/or newly prescribed medication, alternative treatment options, and OTC medications.  Patient verbalized understanding of potential risks, any off label use of medication, any black box warnings, and any side effects in their own words. The patient verbalized  understanding and agreed to comply with the safety plan discussed in their own words.  Patient given the number to the office. Number also discussed of the 24- hour suicide hotline.           MEDS ORDERED DURING VISIT:  New Medications Ordered This Visit   Medications   • hydrOXYzine (ATARAX) 25 MG tablet     Sig: Take 1 tablet by mouth At Night As Needed (anxiety and/or sleep).     Dispense:  30 tablet     Refill:  0   • lamoTRIgine (LaMICtal) 25 MG tablet     Sig: Take 2 tablets by mouth Daily.     Dispense:  60 tablet     Refill:  0   • prazosin (MINIPRESS) 2 MG capsule     Sig: Take 1 capsule by mouth Every Night.     Dispense:  30 capsule     Refill:  0   • Vortioxetine HBr (Trintellix) 20 MG tablet     Sig: Take 1 tablet by mouth Daily With Breakfast.     Dispense:  30 tablet     Refill:  0       Return in about 4 weeks (around 11/24/2022), or if symptoms worsen or fail to improve, for Next scheduled follow up and Recheck.           Functional Status: Moderate impairment     Prognosis: Fair with Ongoing Treatment             This document has been electronically signed by ARVIN Tamez  October 27, 2022 15:48 EDT    Some of the data in this electronic note has been brought forward from a previous encounter, any necessary changes have been made, it has been reviewed by this APRN, and it is accurate.    Please note that portions of this note were completed with a voice recognition program.

## 2022-11-01 ENCOUNTER — TELEMEDICINE (OUTPATIENT)
Dept: PSYCHIATRY | Facility: CLINIC | Age: 27
End: 2022-11-01

## 2022-11-01 DIAGNOSIS — F33.1 MODERATE EPISODE OF RECURRENT MAJOR DEPRESSIVE DISORDER: ICD-10-CM

## 2022-11-01 DIAGNOSIS — F41.1 GENERALIZED ANXIETY DISORDER: Primary | ICD-10-CM

## 2022-11-01 PROCEDURE — 90832 PSYTX W PT 30 MINUTES: CPT | Performed by: COUNSELOR

## 2022-11-01 NOTE — PROGRESS NOTES
Date: November 20, 2022  Time In: 2:36 PM   Time Out: 3:12 PM  This provider is located at the [facility]through [facility]), [address], [place] [address] using a secure [employer] Video Visit through [employer]. Patient is being seen remotely via telehealth at home address in [place] and stated they are in a secure environment for this session. The patient's condition being diagnosed/treated is appropriate for telemedicine. The provider identified herself as well as her credentials. The patient, and/or patients guardian, consent to be seen remotely, and when consent is given they understand that the consent allows for patient identifiable information to be sent to a third party as needed. They may refuse to be seen remotely at any time. The electronic data is encrypted and password protected, and the patient and/or guardian has been advised of the potential risks to privacy not withstanding such measures.     You have chosen to receive care through a telehealth visit.  Do you consent to use a video/audio connection for your medical care today? Yes    PROGRESS NOTE  Data:  Maddy Chisholm is a 27 y.o. female who presents today for follow up. Patient states that her parents left on the 22nd and she has been a little sad but states that it is better than the last time they left. Patient states that her parent saw her boyfriend dressed as the corpse bride and everyone thought the costume was funny. Patient states that she was told by her boyfriend not to worry about his gender dysphoria and explained to her that things would be ok but he may want to be a little girly at times. Patient states that while in a store with her mother her mother pointed out someone who was bustamante and that made the patient feel a little anxious. Patient states that work has gotten a little better since the last session but she was talked to in front of the other girls by her boyfriend;'s  grandmother and that was a little embarrassing but the patient states that she is trying to do her best. Patient states that she is also concerned about her boyfriend's dad as he is obnoxious and talks a lot and the patient doesn't like to be left alone in the home with him. Patient states that she knows that her boyfriend's father has some health issues but she feels weird around him.    Chief Complaint: confusion about relationship, feelings of depression and anxiety     History of Present Illness: patient has struggled with feelings of depression and anxiety for several years      Clinical Maneuvering/Intervention:  CBT    (Scales based on 0 - 10 with 10 being the worst)  Depression: 7.5-8 Anxiety: 6.5-7       Assisted patient in processing above session content; acknowledged and normalized patient’s thoughts, feelings, and concerns.  Rationalized patient thought process regarding issues with work and home.  Discussed triggers associated with patient's feelings of anxiety and depression such as issues with her living environment, not feelign motivated, and not feeling that she is as responsible as she should be and concerns at work.  Also discussed coping skills for patient to implement such as asking for help with the things that she feels that she doesn't know that are age appropriate, talking with her boyfriend about her discomfort with his father, and increasing communication with her boyfriend in regard to how her feelings about his sexuality are impacting her.     Allowed patient to freely discuss issues without interruption or judgment. Provided safe, confidential environment to facilitate the development of positive therapeutic relationship and encourage open, honest communication. Assisted patient in identifying risk factors which would indicate the need for higher level of care including thoughts to harm self or others and/or self-harming behavior and encouraged patient to contact this office, call 911,  or present to the nearest emergency room should any of these events occur. Discussed crisis intervention services and means to access. Patient adamantly and convincingly denies current suicidal or homicidal ideation or perceptual disturbance.    Assessment:   Assessment   Patient appears to maintain relative stability as compared to their baseline.  However, patient continues to struggle with anxiety and depression which continues to cause impairment in important areas of functioning.  A result, they can be reasonably expected to continue to benefit from treatment and would likely be at increased risk for decompensation otherwise.    Mental Status Exam:   Hygiene:   good  Cooperation:  Cooperative  Eye Contact:  Good  Psychomotor Behavior:  Appropriate  Affect:  Appropriate  Mood: normal  Speech:  Normal  Thought Process:  Circum  Thought Content:  Normal  Suicidal:  None  Homicidal:  None  Hallucinations:  None  Delusion:  None  Memory:  Intact  Orientation:  Person, Place, Time and Situation  Reliability:  good  Insight:  Fair  Judgement:  Good  Impulse Control:  Good  Physical/Medical Issues:  No        Patient's Support Network Includes:  significant other, parents, extended family and friends    Functional Status: Moderate impairment     Progress toward goal: Not at goal    Prognosis: Good with Ongoing Treatment          Plan:    Patient will continue in individual outpatient therapy with focus on improved functioning and coping skills, maintaining stability, and avoiding decompensation and the need for higher level of care.    Patient will adhere to medication regimen as prescribed and report any side effects. Patient will contact this office, call 911 or present to the nearest emergency room should suicidal or homicidal ideations occur. Provide Cognitive Behavioral Therapy and Solution Focused Therapy to improve functioning, maintain stability, and avoid decompensation and the need for higher level of care.      Return in about 3 weeks, or earlier if symptoms worsen or fail to improve.           VISIT DIAGNOSIS:     ICD-10-CM ICD-9-CM   1. Generalized anxiety disorder  F41.1 300.02   2. Moderate episode of recurrent major depressive disorder (HCC)  F33.1 296.32             This document has been electronically signed by PJ Toussaint  November 20, 2022 21:46 EST      Part of this note may be an electronic transcription/translation of spoken language to printed text using the Dragon Dictation System.

## 2022-11-17 DIAGNOSIS — Z30.41 ENCOUNTER FOR SURVEILLANCE OF CONTRACEPTIVE PILLS: ICD-10-CM

## 2022-11-17 RX ORDER — DROSPIRENONE AND ETHINYL ESTRADIOL 0.03MG-3MG
1 KIT ORAL DAILY
Qty: 30 TABLET | Refills: 5 | Status: CANCELLED | OUTPATIENT
Start: 2022-11-17

## 2022-11-21 ENCOUNTER — TELEMEDICINE (OUTPATIENT)
Dept: PSYCHIATRY | Facility: CLINIC | Age: 27
End: 2022-11-21

## 2022-11-21 DIAGNOSIS — F41.1 GENERALIZED ANXIETY DISORDER: Primary | ICD-10-CM

## 2022-11-21 DIAGNOSIS — F33.1 MODERATE EPISODE OF RECURRENT MAJOR DEPRESSIVE DISORDER: ICD-10-CM

## 2022-11-21 PROCEDURE — 90834 PSYTX W PT 45 MINUTES: CPT | Performed by: COUNSELOR

## 2022-11-21 NOTE — PROGRESS NOTES
Date: November 26, 2022  Time In: 1:08 PM   Time Out: 2:00 PM  This provider is located at the [facility] ([facility]), [address], [place] [address] using a secure [employer] Video Visit through [employer]. Patient is being seen remotely via telehealth at home address in [place] and stated they are in a secure environment for this session. The patient's condition being diagnosed/treated is appropriate for telemedicine. The provider identified herself as well as her credentials. The patient, and/or patients guardian, consent to be seen remotely, and when consent is given they understand that the consent allows for patient identifiable information to be sent to a third party as needed. They may refuse to be seen remotely at any time. The electronic data is encrypted and password protected, and the patient and/or guardian has been advised of the potential risks to privacy not withstanding such measures.     You have chosen to receive care through a telehealth visit.  Do you consent to use a video/audio connection for your medical care today? Yes    PROGRESS NOTE  Data:  Maddy Chisholm is a 27 y.o. female who presents today for follow up. Patient states that she dropped her boyfriend off at work and presented from her car for the session. Patient states that her boyfriend is still not driving but he has now decided that he wants to move out from his parents house and have a child. Patient states that her boyfriend would like to have a house but the patient states that she would rather have an apartment first but she does feel committed to her boyfriend who has now told her that he is doing better with his gender issues and wants to have a family. Patient understands how her age could affect her ability to have children so she would like to get started on a family sooner rather than later but is also aware that she and her boyfriend need to have an established  "foundation of their own. Patient acknowledges that she needs to start a savings account as she doesn't have one and that is something that she feels that other people her age would want to have.  Patient states that she has been working but she is concerned about how she forgets things such as clocking in & out, forgetting to give a medication or forgetting to give an important message to someone. Patient states that she is asked by co-workers if she has completed certain tasks and she feels that is annoying but is understanding of why it must be asked. Patient states that she was diagnosed with ADD by a PCP in [place] several years ago but she has also started wondering is she has borderline personality disorder but could not provide a reason for the thought. Patient wants to find out what is \"wrong\" with her and is exploring all ideas. 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    Chief Complaint: feelings of anxiety and depression, not being where she wants to be in life, work frustrations    History of Present Illness: patient has struggled with feelings of anxiety and depression for several years.      Clinical Maneuvering/Intervention: solution focused    (Scales based on 0 - 10 with 10 being the worst)  Depression: 6.5 Anxiety: 6       Assisted patient in processing above session content; acknowledged and normalized patient’s thoughts, feelings, and concerns.  Rationalized patient thought process regarding her wants in life and work issues.  Discussed triggers associated with patient's feelings of anxiety and depression such as wanting to move forward in life, work issues and looking " "for answers as to what is \"wrong\" with her that she is not where others her age are.  Also discussed coping skills for patient to implement such as trying to increase her work hours to see if that will help her get into a routine, talking with supervisor to see if anyone has any tips to help her be more efficient at her job, and taking a close look at what the patient and her boyfriend want for their future and developing a time line and budget to match.    Allowed patient to freely discuss issues without interruption or judgment. Provided safe, confidential environment to facilitate the development of positive therapeutic relationship and encourage open, honest communication. Assisted patient in identifying risk factors which would indicate the need for higher level of care including thoughts to harm self or others and/or self-harming behavior and encouraged patient to contact this office, call 911, or present to the nearest emergency room should any of these events occur. Discussed crisis intervention services and means to access. Patient adamantly and convincingly denies current suicidal or homicidal ideation or perceptual disturbance.    Assessment:   Assessment   Patient appears to maintain relative stability as compared to their baseline.  However, patient continues to struggle with anxiety and depression which continues to cause impairment in important areas of functioning.  A result, they can be reasonably expected to continue to benefit from treatment and would likely be at increased risk for decompensation otherwise.    Mental Status Exam:   Hygiene:   good  Cooperation:  Cooperative  Eye Contact:  Good  Psychomotor Behavior:  Appropriate  Affect:  Appropriate  Mood: neutral   Speech:  Normal  Thought Process:  Circum  Thought Content:  Normal  Suicidal:  None  Homicidal:  None  Hallucinations:  None  Delusion:  None  Memory:  Intact  Orientation:  Person, Place, Time and Situation  Reliability:  " fair  Insight:  Fair  Judgement:  Good  Impulse Control:  Good  Physical/Medical Issues:  No        Patient's Support Network Includes:  significant other and extended family    Functional Status: Moderate impairment     Progress toward goal: Not at goal    Prognosis: Good with Ongoing Treatment          Plan:    Patient will continue in individual outpatient therapy with focus on improved functioning and coping skills, maintaining stability, and avoiding decompensation and the need for higher level of care.    Patient will adhere to medication regimen as prescribed and report any side effects. Patient will contact this office, call 911 or present to the nearest emergency room should suicidal or homicidal ideations occur. Provide Cognitive Behavioral Therapy and Solution Focused Therapy to improve functioning, maintain stability, and avoid decompensation and the need for higher level of care.     Return in about 2 weeks, or earlier if symptoms worsen or fail to improve.           VISIT DIAGNOSIS:     ICD-10-CM ICD-9-CM   1. Generalized anxiety disorder  F41.1 300.02   2. Moderate episode of recurrent major depressive disorder (HCC)  F33.1 296.32             This document has been electronically signed by PJ Toussaint  November 26, 2022 06:49 EST      Part of this note may be an electronic transcription/translation of spoken language to printed text using the Dragon Dictation System.

## 2022-11-29 ENCOUNTER — TELEMEDICINE (OUTPATIENT)
Dept: PSYCHIATRY | Facility: CLINIC | Age: 27
End: 2022-11-29

## 2022-11-29 DIAGNOSIS — F33.1 MAJOR DEPRESSIVE DISORDER, RECURRENT EPISODE, MODERATE: Primary | Chronic | ICD-10-CM

## 2022-11-29 DIAGNOSIS — F51.5 NIGHTMARES: ICD-10-CM

## 2022-11-29 DIAGNOSIS — G47.9 SLEEPING DIFFICULTIES: ICD-10-CM

## 2022-11-29 DIAGNOSIS — F41.9 ANXIETY DISORDER, UNSPECIFIED TYPE: Chronic | ICD-10-CM

## 2022-11-29 PROCEDURE — 99214 OFFICE O/P EST MOD 30 MIN: CPT | Performed by: NURSE PRACTITIONER

## 2022-11-29 RX ORDER — VORTIOXETINE 20 MG/1
20 TABLET, FILM COATED ORAL
Qty: 30 TABLET | Refills: 0 | Status: SHIPPED | OUTPATIENT
Start: 2022-11-29 | End: 2022-12-06

## 2022-11-29 RX ORDER — PRAZOSIN HYDROCHLORIDE 2 MG/1
2 CAPSULE ORAL NIGHTLY
Qty: 30 CAPSULE | Refills: 0 | Status: SHIPPED | OUTPATIENT
Start: 2022-11-29 | End: 2023-01-04 | Stop reason: SDUPTHER

## 2022-11-29 RX ORDER — LAMOTRIGINE 25 MG/1
50 TABLET ORAL DAILY
Qty: 60 TABLET | Refills: 0 | Status: SHIPPED | OUTPATIENT
Start: 2022-11-29 | End: 2023-01-04 | Stop reason: SDUPTHER

## 2022-11-29 RX ORDER — HYDROXYZINE HYDROCHLORIDE 25 MG/1
25 TABLET, FILM COATED ORAL NIGHTLY PRN
Qty: 30 TABLET | Refills: 0 | Status: SHIPPED | OUTPATIENT
Start: 2022-11-29 | End: 2023-01-04 | Stop reason: SDUPTHER

## 2022-11-29 NOTE — PROGRESS NOTES
This provider is located at the [facility] (through [facility]), [address], [facility], [address] using a secure [employer] Video Visit through [employer]. Patient is being seen remotely via telehealth at their home address in [place], and stated they are in a secure environment for this session. The patient's condition being diagnosed/treated is appropriate for telemedicine. The provider identified herself as well as her credentials.   The patient, and/or patients guardian, consent to be seen remotely, and when consent is given they understand that the consent allows for patient identifiable information to be sent to a third party as needed.   They may refuse to be seen remotely at any time. The electronic data is encrypted and password protected, and the patient and/or guardian has been advised of the potential risks to privacy not withstanding such measures.    You have chosen to receive care through a telehealth visit.  Do you consent to use a video/audio connection for your medical care today? Yes    Subjective   Maddy Chisholm is a 27 y.o. female who presents today for follow up    Chief Complaint: Depression, anxiety, sleeping difficulties, and nightmares follow-up    Accompanied by: The patient is alone at today's encounter    History of Present Illness:   The patient describes her mood as improve a little bit since her last encounter with this APRN.  The patient states she was diagnosed with ADHD when she was around 14 years of age, and is interested in pursuing further testing for this.  The patient reports she struggles with talking about her past a lot because it is not things that she wants to bring up, and she is not pleased with some of the things that she has done and some of the decisions that she has made, but she reports she is working on this in therapy.  The patient rates her symptoms of depression at a 7/10 on a 0-10 scale, with 10 being the  worst.  The patient rates her symptoms of anxiety at a 7/10 on a 0-10 scale, with 10 being the worst.  The patient reports her appetite as good.  The patient reports her sleep as fair.  The patient reports she still does experience nightmares.  The patient reports her anxiety has been a little higher recently due to stress at work, and reports she feels her heightened anxiety symptoms are why she has had some nightmares recently.  The patient denies any new medical problems or changes in medications since last appointment with this facility.  The patient reports compliance with her current medication regimen.  The patient denies any side effects, rashes, or concerns from her current medication regimen.   The patient would like to not adjust or change her medications at this visit.  The patient reports she would like to pursue further in-depth psychological testing.  This APRN and the patient have discussed how to pursue further psychological testing.  The patient reports she is going to call her insurance plan to see what psychologists are covered by her insurance plan in her area, and she will call to schedule an appointment for psychological testing based on who was covered by her insurance plan in her area.  The patient reports if she has any difficulty with this she will reach out to this office for further assistance.  The patient denies any suicidal or homicidal ideations, plans, or intent at today's encounter and is convincing.  The patient denies any auditory hallucinations or visual hallucinations.  The patient does not endorse any significant symptoms consistent with oskar or psychosis at today's encounter.        Prior Psychiatric Medications:  -Lexapro - decreased her libido so she stopped taking it (can't remember if it helped her mood or not)  -Prozac  -Wellbutrin  -Hydroxyzine  -Lamictal  -Prazosin  -Trintellix      Last Menstrual Period:  1 week ago.  The patient was educated that her prescribed  medications can have potential risk to a developing fetus. The patient is advised to contact this APRN/this office if she becomes pregnant or plans to become pregnant.  Pt verbalizes understanding and acknowledged agreement with this plan in her own words.        The following portions of the patient's history were reviewed and updated as appropriate: allergies, current medications, past family history, past medical history, past social history, past surgical history and problem list.            Past Medical History:  Past Medical History:   Diagnosis Date   • Anemia    • Anxiety    • COVID 2022   • Depression    • Vitamin B12 deficiency        Social History:  Social History     Socioeconomic History   • Marital status: Single   • Number of children: 0   Tobacco Use   • Smoking status: Former     Packs/day: 0.20     Years: 0.25     Pack years: 0.05     Types: Cigarettes     Start date: 2020     Quit date: 8/15/2020     Years since quittin.2   • Smokeless tobacco: Never   • Tobacco comments:     smoked for 5 years  (black and milds for 5 years then restarted 2020 and quit 2020   Vaping Use   • Vaping Use: Never used   Substance and Sexual Activity   • Alcohol use: Yes     Comment: Only occasionally, states not frequently   • Drug use: Not Currently     Comment: The patient states in her past she has used marijuanna, but not currently   • Sexual activity: Yes     Partners: Male     Birth control/protection: OCP       Family History:  Family History   Adopted: Yes       Past Surgical History:  Past Surgical History:   Procedure Laterality Date   • NO PAST SURGERIES         Problem List:  Patient Active Problem List   Diagnosis   • Iron deficiency anemia secondary to inadequate dietary iron intake   • B12 deficiency   • Anxiety   • BV (bacterial vaginosis)   • Allergic rhinitis   • Vitiligo   • Generalized anxiety disorder   • Moderate episode of recurrent major depressive disorder (HCC)   • Restless leg    • Excessive sweating       Allergy:   No Known Allergies     Current Medications:   Current Outpatient Medications   Medication Sig Dispense Refill   • hydrOXYzine (ATARAX) 25 MG tablet Take 1 tablet by mouth At Night As Needed (anxiety and/or sleep). 30 tablet 0   • lamoTRIgine (LaMICtal) 25 MG tablet Take 2 tablets by mouth Daily. 60 tablet 0   • prazosin (MINIPRESS) 2 MG capsule Take 1 capsule by mouth Every Night. 30 capsule 0   • Vortioxetine HBr (Trintellix) 20 MG tablet Take 1 tablet by mouth Daily With Breakfast. 30 tablet 0   • aluminum chloride (DRYSOL) 20 % external solution Apply  topically to the appropriate area as directed Every Night. 60 mL 1   • drospirenone-ethinyl estradiol (TICO,OCELLA) 3-0.03 MG per tablet Take 1 tablet by mouth Daily. 30 tablet 5   • fluticasone (FLONASE) 50 MCG/ACT nasal spray 2 sprays by Each Nare route Daily. 16 g 3     No current facility-administered medications for this visit.       Review of Symptoms:    Review of Systems   Psychiatric/Behavioral: Positive for decreased concentration, depressed mood and stress. Negative for agitation, behavioral problems, dysphoric mood, hallucinations, self-injury, suicidal ideas and negative for hyperactivity. Sleep disturbance: improved with medication. The patient is nervous/anxious.          Physical Exam:   not currently breastfeeding. There is no height or weight on file to calculate BMI.   Due to the remote nature of this encounter (virtual encounter), vitals were unable to be obtained.  Height stated at 66.5 inches.  Weight stated around 170-176 pounds.      Physical Exam  Neurological:      Mental Status: She is alert and oriented to person, place, and time.   Psychiatric:         Attention and Perception: Attention normal. She is attentive.         Mood and Affect: Affect normal. Mood is anxious and depressed.         Speech: Speech normal.         Behavior: Behavior normal. Behavior is cooperative.         Thought  Content: Thought content normal. Thought content is not paranoid or delusional. Thought content does not include homicidal or suicidal ideation. Thought content does not include homicidal or suicidal plan.         Cognition and Memory: Cognition and memory normal.         Judgment: Judgment normal. Judgment is not impulsive or inappropriate.         Mental Status Exam:   Hygiene:   good  Cooperation:  Cooperative  Eye Contact:  Good  Psychomotor Behavior:  Appropriate  Affect:  Appropriate  Mood: depressed and anxious  Hopelessness: Denies  Speech:  Normal  Thought Process:  Linear  Thought Content:  Mood congruent  Suicidal:  None  Homicidal:  None  Hallucinations:  None  Delusion:  None  Memory:  Intact  Orientation:  Person, Place, Time and Situation  Reliability:  good  Insight:  Good  Judgement:  Good  Impulse Control:  Good  Physical/Medical Issues:  No          Lab Results:   No visits with results within 1 Month(s) from this visit.   Latest known visit with results is:   Lab on 08/01/2022   Component Date Value Ref Range Status   • WBC 08/01/2022 7.23  3.40 - 10.80 10*3/mm3 Final   • RBC 08/01/2022 5.05  3.77 - 5.28 10*6/mm3 Final   • Hemoglobin 08/01/2022 12.2  12.0 - 15.9 g/dL Final   • Hematocrit 08/01/2022 37.6  34.0 - 46.6 % Final   • MCV 08/01/2022 74.5 (L)  79.0 - 97.0 fL Final   • MCH 08/01/2022 24.2 (L)  26.6 - 33.0 pg Final   • MCHC 08/01/2022 32.4  31.5 - 35.7 g/dL Final   • RDW 08/01/2022 14.4  12.3 - 15.4 % Final   • RDW-SD 08/01/2022 38.4  37.0 - 54.0 fl Final   • MPV 08/01/2022 10.6  6.0 - 12.0 fL Final   • Platelets 08/01/2022 256  140 - 450 10*3/mm3 Final   • Ferritin 08/01/2022 28.70  13.00 - 150.00 ng/mL Final   • Vitamin B-12 08/01/2022 686  211 - 946 pg/mL Final         Assessment & Plan   Problems Addressed this Visit    None  Visit Diagnoses     Major depressive disorder, recurrent episode, moderate (HCC)  (Chronic)   -  Primary    R/O BPD R/O Mood d/o    Relevant Medications     Vortioxetine HBr (Trintellix) 20 MG tablet    lamoTRIgine (LaMICtal) 25 MG tablet    hydrOXYzine (ATARAX) 25 MG tablet    Anxiety disorder, unspecified type  (Chronic)       Relevant Medications    Vortioxetine HBr (Trintellix) 20 MG tablet    hydrOXYzine (ATARAX) 25 MG tablet    Nightmares        Relevant Medications    Vortioxetine HBr (Trintellix) 20 MG tablet    prazosin (MINIPRESS) 2 MG capsule    hydrOXYzine (ATARAX) 25 MG tablet    Sleeping difficulties        Relevant Medications    hydrOXYzine (ATARAX) 25 MG tablet      Diagnoses       Codes Comments    Major depressive disorder, recurrent episode, moderate (HCC)    -  Primary ICD-10-CM: F33.1  ICD-9-CM: 296.32 R/O BPD R/O Mood d/o    Anxiety disorder, unspecified type     ICD-10-CM: F41.9  ICD-9-CM: 300.00     Nightmares     ICD-10-CM: F51.5  ICD-9-CM: 307.47     Sleeping difficulties     ICD-10-CM: G47.9  ICD-9-CM: 780.50           Visit Diagnoses:    ICD-10-CM ICD-9-CM   1. Major depressive disorder, recurrent episode, moderate (HCC)  F33.1 296.32   2. Anxiety disorder, unspecified type  F41.9 300.00   3. Nightmares  F51.5 307.47   4. Sleeping difficulties  G47.9 780.50          GOALS:  Short Term Goals: Patient will be compliant with medication, and patient will have no significant medication related side effects.  Patient will be engaged in psychotherapy as indicated.  Patient will report subjective improvement of symptoms.  Long term goals: To stabilize mood and treat/improve subjective symptoms, the patient will stay out of the hospital, the patient will be at an optimal level of functioning, and the patient will take all medications as prescribed.  The patient verbalized understanding and agreement with goals that were mutually set.      TREATMENT PLAN: Continue supportive psychotherapy efforts and take medications as indicated.  Medication and treatment options, both pharmacological and non-pharmacological treatment options, discussed during  today's visit, including any off label use of medication. Patient acknowledged and verbally consented with current treatment plan and was educated on the importance of compliance with treatment and follow-up appointments.      -Continue hydroxyzine 25 mg by mouth once daily at bedtime as needed for sleep and/or anxiety.  -Continue lamotrigine 50 mg by mouth once daily for mood.  -Continue Prazosin 2 mg by mouth once nightly at bedtime for nightmares.  -Continue Trintellix 20 mg by mouth once daily in the morning with breakfast for moods.  -The patient reports she would like to pursue further in-depth psychological testing.  This APRN and the patient have discussed how to pursue further psychological testing.  The patient reports she is going to call her insurance plan to see what psychologists are covered by her insurance plan in her area, and she will call to schedule an appointment for psychological testing based on who was covered by her insurance plan in her area.  The patient reports if she has any difficulty with this she will reach out to this office for further assistance.       MEDICATION ISSUES:  Discussed medication options and treatment plan of prescribed medication, any off label use of medication, as well as the risks, benefits, any black box warnings including increased suicidality, and side effects including but not limited to potential falls, dizziness, possible impaired driving, GI side effects (change in appetite, abdominal discomfort, nausea, vomiting, diarrhea, and/or constipation), dry mouth, somnolence, sedation, insomnia, activation, agitation, irritation, tremors, abnormal muscle movements or disorders, headache, sweating, possible bruising or rare bleeding, electrolyte and/or fluid abnormalities, change in blood pressure/heart rate/and or heart rhythm, sexual dysfunction, and metabolic adversities among others. Patient and/or guardian agreeable to call the office with any worsening of  symptoms or onset of side effects, or if any concerns or questions arise.  The contact information for the office is made available to the patient and/or guardian.  Patient and/or guardian agreeable to call 911 or go to the nearest ER should they begin having any SI/HI, or if any urgent concerns arise. No medication side effects or related complaints today.    This APRN has discussed the benefits and risks of taking/continuing Lamictal (Lamotrigine).  The side effects of Lamictal can include a benign rash, blurred or double vision, dizziness, ataxia, sedation, headache, tremor, insomnia, poor coordination, fatigue,  nausea, vomiting, dyspepsia, rhinitis, infection, pharyngitis, asthenia, a rare but serious rash, rare multi-organ failure associated with Santana-Jean Syndrome, toxic epidermal necrolysis, drug hypersensitivity syndrome, rare blood dyscrasias, rare aseptic meningitis, rare sudden unexplained deaths in people with epilepsy, withdrawal seizures upon abrupt withdrawal, and rare activation of suicidal ideation and behavior (suicidality).  This APRN has discussed that a very slow dose titration when starting, or changing doses, of Lamictal may reduce the incidence of skin rash and other side effects.  The dosage should not be titrated upwards or increased faster than recommended due to the possibility of the discussed side effects and risk of development of a skin rash (which can become life threatening).    This APRN has also discussed that if the patient stops taking the Lamictal for 3-5 days or longer, it will be necessary to restart the drug with an initial dose titration, as rashes have been reported on reexposure.  If the patient and Provider decide to stop the Lamictal, the patient will follow the directions of this APRN/this office as a guided taper over about two weeks is appropriate due to the risk of relapse in bipolar disorder with those with a mood or bipolar disorder, the risk of seizures in  those with epilepsy, and discontinuation symptoms upon rapid discontinuation of Lamictal.    The patient verbalizes understanding of benefits and risks as discussed, the patient/guardian feels the benefits outweigh the risks and is agreeable to continue/take Lamictal as discussed.  The patient is advised should any side effects or rash develops they are to stop the Lamictal immediately and contact this APRN/this office or go to the emergency department immediately.  The patient verbalizes understanding and agreement with treatment plan in their own words.      SUICIDE RISK ASSESSMENT AND SAFETY PLAN: Unalterable demographics and a history of mental health intervention indicate this patient is in a high risk category compared to the general population. At present, the patient denies active SI/HI, intentions, or plans at this time and agrees to seek immediate care should such thoughts develop. The patient verbalizes understanding of how to access emergency care if needed and agrees to do so. Consideration of suicide risk and protective factors such as history, current presentation, individual strengths and weaknesses, psychosocial and environmental stressors and variables, psychiatric illness and symptoms, medical conditions and pain, took place in this interview. Based on those considerations, the patient is determined: within individual baseline and presenting no imminent risk for suicide or homicide. Other recommendations: The patient does not meet the criteria for inpatient admission and is not a safety risk to self or others at today's visit. Inpatient treatment offers no significant advantages over outpatient treatment for this patient at today's visit.  The patient was given ample time for questions and fully participated in treatment planning.  The patient was encouraged to call the clinic with any questions or concerns.  The patient was informed of access to emergency care. If patient were to develop any  significant symptomatology, suicidal ideation, homicidal ideation, any concerns, or feel unsafe at any time they are to call the clinic and if unable to get immediate assistance should immediately call 911 or go to the nearest emergency room.  Patient contracted verbally for the following: If you are experiencing an emotional crisis or have thoughts of harming yourself or others, please go to your nearest local emergency room or call 911. Will continue to re-assess medication response and side effects frequently to establish efficacy and ensure safety. Risks, any black box warnings, side effects, off label usage, and benefits of medication and treatment discussed with patient, along with potential adverse side effects of current and/or newly prescribed medication, alternative treatment options, and OTC medications.  Patient verbalized understanding of potential risks, any off label use of medication, any black box warnings, and any side effects in their own words. The patient verbalized understanding and agreed to comply with the safety plan discussed in their own words.  Patient given the number to the office. Number also discussed of the 24- hour suicide hotline.           MEDS ORDERED DURING VISIT:  New Medications Ordered This Visit   Medications   • Vortioxetine HBr (Trintellix) 20 MG tablet     Sig: Take 1 tablet by mouth Daily With Breakfast.     Dispense:  30 tablet     Refill:  0   • prazosin (MINIPRESS) 2 MG capsule     Sig: Take 1 capsule by mouth Every Night.     Dispense:  30 capsule     Refill:  0   • lamoTRIgine (LaMICtal) 25 MG tablet     Sig: Take 2 tablets by mouth Daily.     Dispense:  60 tablet     Refill:  0   • hydrOXYzine (ATARAX) 25 MG tablet     Sig: Take 1 tablet by mouth At Night As Needed (anxiety and/or sleep).     Dispense:  30 tablet     Refill:  0       Return in about 4 weeks (around 12/27/2022), or if symptoms worsen or fail to improve, for Next scheduled follow up and Recheck.        Progress: Not at goal    Functional Status: Moderate impairment     Prognosis: Fair with Ongoing Treatment      Psychotherapy treatment plan: Completed by patient's therapist            This document has been electronically signed by ARVIN Tamez  November 29, 2022 16:07 EST    Some of the data in this electronic note has been brought forward from a previous encounter, any necessary changes have been made, it has been reviewed by this APRN, and it is accurate.    Please note that portions of this note were completed with a voice recognition program.

## 2022-12-06 ENCOUNTER — TELEPHONE (OUTPATIENT)
Dept: PSYCHIATRY | Facility: CLINIC | Age: 27
End: 2022-12-06

## 2022-12-06 DIAGNOSIS — F33.1 MAJOR DEPRESSIVE DISORDER, RECURRENT EPISODE, MODERATE: Chronic | ICD-10-CM

## 2022-12-06 DIAGNOSIS — F41.9 ANXIETY DISORDER, UNSPECIFIED TYPE: Chronic | ICD-10-CM

## 2022-12-06 NOTE — TELEPHONE ENCOUNTER
Called patient with info from [facility], patient agreed to start 10mg. And she will call the office if any issues.

## 2022-12-06 NOTE — TELEPHONE ENCOUNTER
Please let the patient know if she has been off of Trintellix 20 mg for over a week I do not want her to restart the Trintellix at 20 mg due to risk of it causing side effects.  I have sent in a prescription for the patient to restart her Trintellix at 10 mg by mouth once daily for moods, if she has any trouble picking this up at the pharmacy please let me know.

## 2022-12-06 NOTE — TELEPHONE ENCOUNTER
Patient stopped trintellix 20mg for a little over a week, then started it back today. After taking it she thought maybe she shouldn't have just started back at 20mg. She wants to know if this is ok?      Please Advise?    Thank You

## 2022-12-20 ENCOUNTER — TELEMEDICINE (OUTPATIENT)
Dept: PSYCHIATRY | Facility: CLINIC | Age: 27
End: 2022-12-20
Payer: COMMERCIAL

## 2022-12-20 DIAGNOSIS — F41.1 GENERALIZED ANXIETY DISORDER: Primary | ICD-10-CM

## 2022-12-20 DIAGNOSIS — F33.1 MODERATE EPISODE OF RECURRENT MAJOR DEPRESSIVE DISORDER: ICD-10-CM

## 2022-12-20 PROCEDURE — 90832 PSYTX W PT 30 MINUTES: CPT | Performed by: COUNSELOR

## 2022-12-20 NOTE — PROGRESS NOTES
"Date: January 22, 2023  Time In: 2:32 PM  Time Out: 3:00 PM  This provider is located at the [facility]), [address], [place] [address] using a secure [employer] Video Visit through [employer]. Patient is being seen remotely via telehealth at home address in [place] and stated they are in a secure environment for this session. The patient's condition being diagnosed/treated is appropriate for telemedicine. The provider identified herself as well as her credentials. The patient, and/or patients guardian, consent to be seen remotely, and when consent is given they understand that the consent allows for patient identifiable information to be sent to a third party as needed. They may refuse to be seen remotely at any time. The electronic data is encrypted and password protected, and the patient and/or guardian has been advised of the potential risks to privacy not withstanding such measures.     You have chosen to receive care through a telehealth visit.  Do you consent to use a video/audio connection for your medical care today? Yes    PROGRESS NOTE  Data:  Maddy Chisholm is a 27 y.o. female who presents today for follow up. Patient states that she will be spending the [place] holiday with her boyfriend's family but will also see her siblings to exchange gifts. Patient states that she been feeling more confident in her job and has been talking to her coworkers more. Patient states that her boyfriend' family is tied up in drama related to one of his cousins who was just sentenced to 20 years in prison on child pornography charges. Patient states that it \"sucks\" that the cousin was given so much time. Patient states that she doesn't know what to do at this point; discussed that the patient is not able to change the sentencing but she will be able to be supportive of her boyfriend for the loss that he is dealing with. Patient states that she feels " that her boyfriend wants to pull away from his cousin but others in the family seem to want to keep them close. Discussed that in regard to the charges against the patient's boyfriend's cousin that there can be varying degrees of feelings and family loyalty and that her boyfriend will have to make his own decision on further involvement with his cousin. Patient states that she is wanting to focus on getting out of her boyfriend's parent's home as soon as she can so that she can finally have her own life but continues to feel that she is behind on things that she should have already accomplished in life due to her age. Patient is very concerned about getting started in her own home and is also concerned with managing her finances.     Chief Complaint: physical illness, feelings of anxiety and depression    History of Present Illness: Patient has struggled with anxiety and depression for several years      Clinical Maneuvering/Intervention:  CBT    (Scales based on 0 - 10 with 10 being the worst)  Depression: 6.5 Anxiety: 6.5       Assisted patient in processing above session content; acknowledged and normalized patient’s thoughts, feelings, and concerns.  Rationalized patient thought process regarding her boyfriend's family drama and the upcoming Christmas holiday.  Discussed triggers associated with patient's feelings of anxiety and depression such as continuing to live with her boyfriend's parents and family drama within her boyfriend's family.  Also discussed coping skills for patient to implement such as not responding to calls or messages that she is uncomfortable with, checking into other housing options and planning out a budget with her boyfriend.    Allowed patient to freely discuss issues without interruption or judgment. Provided safe, confidential environment to facilitate the development of positive therapeutic relationship and encourage open, honest communication. Assisted patient in identifying risk  factors which would indicate the need for higher level of care including thoughts to harm self or others and/or self-harming behavior and encouraged patient to contact this office, call 911, or present to the nearest emergency room should any of these events occur. Discussed crisis intervention services and means to access. Patient adamantly and convincingly denies current suicidal or homicidal ideation or perceptual disturbance.    Assessment:   Assessment   Patient appears to maintain relative stability as compared to their baseline.  However, patient continues to struggle with anxiety and depression which continues to cause impairment in important areas of functioning.  A result, they can be reasonably expected to continue to benefit from treatment and would likely be at increased risk for decompensation otherwise.    Mental Status Exam:   Hygiene:   good  Cooperation:  Cooperative  Eye Contact:  Good  Psychomotor Behavior:  Appropriate  Affect:  Appropriate  Mood: sad and anxious  Speech:  Normal  Thought Process:  Goal directed  Thought Content:  Normal  Suicidal:  None  Homicidal:  None  Hallucinations:  None  Delusion:  None  Memory:  Deficits- forgetful at times  Orientation:  Person, Place, Time and Situation  Reliability:  fair  Insight:  Fair  Judgement:  Good  Impulse Control:  Good  Physical/Medical Issues:  No        Patient's Support Network Includes:  significant other and extended family    Functional Status: Moderate impairment     Progress toward goal: Not at goal    Prognosis: Fair with Ongoing Treatment          Plan:    Patient will continue in individual outpatient therapy with focus on improved functioning and coping skills, maintaining stability, and avoiding decompensation and the need for higher level of care.    Patient will adhere to medication regimen as prescribed and report any side effects. Patient will contact this office, call 911 or present to the nearest emergency room should  suicidal or homicidal ideations occur. Provide Cognitive Behavioral Therapy and Solution Focused Therapy to improve functioning, maintain stability, and avoid decompensation and the need for higher level of care.     Return in about 4 weeks, or earlier if symptoms worsen or fail to improve.           VISIT DIAGNOSIS:     ICD-10-CM ICD-9-CM   1. Generalized anxiety disorder  F41.1 300.02   2. Moderate episode of recurrent major depressive disorder (HCC)  F33.1 296.32             This document has been electronically signed by PJ Toussaint  January 22, 2023 19:43 EST      Part of this note may be an electronic transcription/translation of spoken language to printed text using the Dragon Dictation System.

## 2023-01-04 ENCOUNTER — TELEMEDICINE (OUTPATIENT)
Dept: PSYCHIATRY | Facility: CLINIC | Age: 28
End: 2023-01-04
Payer: COMMERCIAL

## 2023-01-04 DIAGNOSIS — G47.9 SLEEPING DIFFICULTIES: ICD-10-CM

## 2023-01-04 DIAGNOSIS — F33.1 MAJOR DEPRESSIVE DISORDER, RECURRENT EPISODE, MODERATE: Primary | Chronic | ICD-10-CM

## 2023-01-04 DIAGNOSIS — F41.9 ANXIETY DISORDER, UNSPECIFIED TYPE: Chronic | ICD-10-CM

## 2023-01-04 DIAGNOSIS — F51.5 NIGHTMARES: ICD-10-CM

## 2023-01-04 PROCEDURE — 1160F RVW MEDS BY RX/DR IN RCRD: CPT | Performed by: NURSE PRACTITIONER

## 2023-01-04 PROCEDURE — 99214 OFFICE O/P EST MOD 30 MIN: CPT | Performed by: NURSE PRACTITIONER

## 2023-01-04 PROCEDURE — 1159F MED LIST DOCD IN RCRD: CPT | Performed by: NURSE PRACTITIONER

## 2023-01-04 RX ORDER — LAMOTRIGINE 25 MG/1
50 TABLET ORAL DAILY
Qty: 60 TABLET | Refills: 0 | Status: SHIPPED | OUTPATIENT
Start: 2023-01-04 | End: 2023-01-31 | Stop reason: SDUPTHER

## 2023-01-04 RX ORDER — PRAZOSIN HYDROCHLORIDE 2 MG/1
2 CAPSULE ORAL NIGHTLY
Qty: 30 CAPSULE | Refills: 0 | Status: SHIPPED | OUTPATIENT
Start: 2023-01-04 | End: 2023-01-31 | Stop reason: SDUPTHER

## 2023-01-04 RX ORDER — HYDROXYZINE HYDROCHLORIDE 25 MG/1
25 TABLET, FILM COATED ORAL NIGHTLY PRN
Qty: 30 TABLET | Refills: 0 | Status: SHIPPED | OUTPATIENT
Start: 2023-01-04 | End: 2023-01-31 | Stop reason: SDUPTHER

## 2023-01-04 NOTE — PROGRESS NOTES
This provider is located at the [facility] (through [facility]), [address], [facility], [address] using a secure [employer] Video Visit through [employer]. Patient is being seen remotely via telehealth at their home address in [place], and stated they are in a secure environment for this session. The patient's condition being diagnosed/treated is appropriate for telemedicine. The provider identified herself as well as her credentials.   The patient, and/or patients guardian, consent to be seen remotely, and when consent is given they understand that the consent allows for patient identifiable information to be sent to a third party as needed.   They may refuse to be seen remotely at any time. The electronic data is encrypted and password protected, and the patient and/or guardian has been advised of the potential risks to privacy not withstanding such measures.    You have chosen to receive care through a telehealth visit.  Do you consent to use a video/audio connection for your medical care today? Yes    Patient identifiers utilized: Name and date of birth.    Patient verbally confirmed consent for today's encounter 1/4/2023.    Subjective   Maddy Chisholm is a 27 y.o. female who presents today for follow up    Chief Complaint: Medication management follow-up/refills - depression, anxiety, sleeping difficulties, and nightmares follow-up    Accompanied by: The patient is alone at today's encounter    History of Present Illness:   The patient describes her mood as \"pretty irritable\" over the last few weeks.  The patient states she does not know why she is more irritable overall other than she did have to lower her Trintellix dosage because of missing doses.  The patient reports she did have a good holiday and new year, and is looking forward to 2023 being an even better year than 2022 was.  The patient reports her and her significant other are hoping to get their own  place this year.  The patient rates her symptoms of depression at a 6.5/10 on a 0-10 scale, with 10 being the worst.  The patient rates her symptoms of anxiety at a 7/10 on a 0-10 scale, with 10 being the worst.  The patient reports her appetite as good.  The patient reports her sleep as good with the use of her current medications.  She reports her nightmares have returned more consistently it seems.  The patient reports she does not wake up from her nightmares, but remembers them when she wakes up in the mornings.  The patient denies any new medical problems or changes in medications since last appointment with this facility.  The patient reports compliance with her current medication regimen.  The patient denies any side effects, rashes, or concerns from her current medication regimen.   The patient reports that she would like to adjust her medications at this visit, and would like to try increasing her Trintellix dosage back up to 20 mg if possible.  The patient reports she did reach out to her Medicaid insurance  and will have to reach back out to them because she has not heard back from them, but they are helping her find a psychologist in her area to have psychological testing completed.  The patient denies any suicidal or homicidal ideations, plans, or intent at today's encounter and is convincing.  The patient denies any auditory hallucinations or visual hallucinations.  The patient does not endorse any significant symptoms consistent with oskar or psychosis at today's encounter.      Prior Psychiatric Medications:  -Lexapro - decreased her libido so she stopped taking it (can't remember if it helped her mood or not)  -Prozac  -Wellbutrin  -Hydroxyzine  -Lamictal  -Prazosin  -Trintellix      Last Menstrual Period:  12/22/2022.  The patient was educated that her prescribed medications can have potential risk to a developing fetus. The patient is advised to contact this APRN/this office if she  becomes pregnant or plans to become pregnant.  Pt verbalizes understanding and acknowledged agreement with this plan in her own words.        The following portions of the patient's history were reviewed and updated as appropriate: allergies, current medications, past family history, past medical history, past social history, past surgical history and problem list.            Past Medical History:  Past Medical History:   Diagnosis Date   • Anemia    • Anxiety    • COVID 2022   • Depression    • Vitamin B12 deficiency        Social History:  Social History     Socioeconomic History   • Marital status: Single   • Number of children: 0   Tobacco Use   • Smoking status: Former     Packs/day: 0.20     Years: 0.25     Pack years: 0.05     Types: Cigarettes     Start date: 2020     Quit date: 8/15/2020     Years since quittin.3   • Smokeless tobacco: Never   • Tobacco comments:     smoked for 5 years  (black and milds for 5 years then restarted 2020 and quit 2020   Vaping Use   • Vaping Use: Never used   Substance and Sexual Activity   • Alcohol use: Yes     Comment: Only occasionally, states not frequently   • Drug use: Not Currently     Comment: The patient states in her past she has used marijuanna, but not currently   • Sexual activity: Yes     Partners: Male     Birth control/protection: OCP       Family History:  Family History   Adopted: Yes       Past Surgical History:  Past Surgical History:   Procedure Laterality Date   • NO PAST SURGERIES         Problem List:  Patient Active Problem List   Diagnosis   • Iron deficiency anemia secondary to inadequate dietary iron intake   • B12 deficiency   • Anxiety   • BV (bacterial vaginosis)   • Allergic rhinitis   • Vitiligo   • Generalized anxiety disorder   • Moderate episode of recurrent major depressive disorder (HCC)   • Restless leg   • Excessive sweating       Allergy:   No Known Allergies     Current Medications:   Current Outpatient Medications    Medication Sig Dispense Refill   • hydrOXYzine (ATARAX) 25 MG tablet Take 1 tablet by mouth At Night As Needed (anxiety and/or sleep). 30 tablet 0   • lamoTRIgine (LaMICtal) 25 MG tablet Take 2 tablets by mouth Daily. 60 tablet 0   • prazosin (MINIPRESS) 2 MG capsule Take 1 capsule by mouth Every Night. 30 capsule 0   • Vortioxetine HBr (Trintellix) 20 MG tablet Take 1 tablet by mouth Daily With Breakfast. 30 tablet 0   • aluminum chloride (DRYSOL) 20 % external solution Apply  topically to the appropriate area as directed Every Night. 60 mL 1   • drospirenone-ethinyl estradiol (TICO,OCELLA) 3-0.03 MG per tablet Take 1 tablet by mouth Daily. 30 tablet 5   • fluticasone (FLONASE) 50 MCG/ACT nasal spray 2 sprays by Each Nare route Daily. 16 g 3     No current facility-administered medications for this visit.       Review of Symptoms:    Review of Systems   Psychiatric/Behavioral: Positive for agitation, decreased concentration, sleep disturbance (improved with medication), depressed mood and stress. Negative for behavioral problems, dysphoric mood, hallucinations, self-injury, suicidal ideas and negative for hyperactivity. The patient is nervous/anxious.          Physical Exam:   not currently breastfeeding. There is no height or weight on file to calculate BMI.   Due to the remote nature of this encounter (virtual encounter), vitals were unable to be obtained.  Height stated at 66.5 inches.  Weight stated around 170-176 pounds.      Physical Exam  Neurological:      Mental Status: She is alert and oriented to person, place, and time.   Psychiatric:         Attention and Perception: Attention normal.         Mood and Affect: Affect normal. Mood is anxious and depressed.         Speech: Speech normal.         Behavior: Behavior is cooperative.         Thought Content: Thought content normal. Thought content is not paranoid or delusional. Thought content does not include homicidal or suicidal ideation. Thought  content does not include homicidal or suicidal plan.         Cognition and Memory: Cognition and memory normal.         Judgment: Judgment normal. Judgment is not impulsive or inappropriate.         Mental Status Exam:   Hygiene:   good  Cooperation:  Cooperative  Eye Contact:  Good  Psychomotor Behavior:  Restless  Affect:  Appropriate  Mood: depressed and anxious  Hopelessness: Denies  Speech:  Normal  Thought Process:  Linear  Thought Content:  Mood congruent  Suicidal:  None  Homicidal:  None  Hallucinations:  None  Delusion:  None  Memory:  Intact  Orientation:  Person, Place, Time and Situation  Reliability:  good  Insight:  Good  Judgement:  Good  Impulse Control:  Good  Physical/Medical Issues:  No          Lab Results:   No visits with results within 1 Month(s) from this visit.   Latest known visit with results is:   Lab on 08/01/2022   Component Date Value Ref Range Status   • WBC 08/01/2022 7.23  3.40 - 10.80 10*3/mm3 Final   • RBC 08/01/2022 5.05  3.77 - 5.28 10*6/mm3 Final   • Hemoglobin 08/01/2022 12.2  12.0 - 15.9 g/dL Final   • Hematocrit 08/01/2022 37.6  34.0 - 46.6 % Final   • MCV 08/01/2022 74.5 (L)  79.0 - 97.0 fL Final   • MCH 08/01/2022 24.2 (L)  26.6 - 33.0 pg Final   • MCHC 08/01/2022 32.4  31.5 - 35.7 g/dL Final   • RDW 08/01/2022 14.4  12.3 - 15.4 % Final   • RDW-SD 08/01/2022 38.4  37.0 - 54.0 fl Final   • MPV 08/01/2022 10.6  6.0 - 12.0 fL Final   • Platelets 08/01/2022 256  140 - 450 10*3/mm3 Final   • Ferritin 08/01/2022 28.70  13.00 - 150.00 ng/mL Final   • Vitamin B-12 08/01/2022 686  211 - 946 pg/mL Final         Assessment & Plan   Problems Addressed this Visit    None  Visit Diagnoses     Major depressive disorder, recurrent episode, moderate (HCC)  (Chronic)   -  Primary    R/O BPD R/O Mood d/o    Relevant Medications    Vortioxetine HBr (Trintellix) 20 MG tablet    lamoTRIgine (LaMICtal) 25 MG tablet    hydrOXYzine (ATARAX) 25 MG tablet    Anxiety disorder, unspecified type   (Chronic)       Relevant Medications    Vortioxetine HBr (Trintellix) 20 MG tablet    hydrOXYzine (ATARAX) 25 MG tablet    Sleeping difficulties        Relevant Medications    hydrOXYzine (ATARAX) 25 MG tablet    Nightmares        Relevant Medications    Vortioxetine HBr (Trintellix) 20 MG tablet    prazosin (MINIPRESS) 2 MG capsule    hydrOXYzine (ATARAX) 25 MG tablet      Diagnoses       Codes Comments    Major depressive disorder, recurrent episode, moderate (HCC)    -  Primary ICD-10-CM: F33.1  ICD-9-CM: 296.32 R/O BPD R/O Mood d/o    Anxiety disorder, unspecified type     ICD-10-CM: F41.9  ICD-9-CM: 300.00     Sleeping difficulties     ICD-10-CM: G47.9  ICD-9-CM: 780.50     Nightmares     ICD-10-CM: F51.5  ICD-9-CM: 307.47           Visit Diagnoses:    ICD-10-CM ICD-9-CM   1. Major depressive disorder, recurrent episode, moderate (HCC)  F33.1 296.32   2. Anxiety disorder, unspecified type  F41.9 300.00   3. Sleeping difficulties  G47.9 780.50   4. Nightmares  F51.5 307.47          GOALS:  Short Term Goals: Patient will be compliant with medication, and patient will have no significant medication related side effects.  Patient will be engaged in psychotherapy as indicated.  Patient will report subjective improvement of symptoms.  Long term goals: To stabilize mood and treat/improve subjective symptoms, the patient will stay out of the hospital, the patient will be at an optimal level of functioning, and the patient will take all medications as prescribed.  The patient verbalized understanding and agreement with goals that were mutually set.      TREATMENT PLAN: Continue supportive psychotherapy efforts and take medications as indicated.  Medication and treatment options, both pharmacological and non-pharmacological treatment options, discussed during today's visit, including any off label use of medication. Patient acknowledged and verbally consented with current treatment plan and was educated on the importance  of compliance with treatment and follow-up appointments.      -Continue hydroxyzine 25 mg by mouth once daily at bedtime as needed for sleep and/or anxiety.  -Continue lamotrigine 50 mg by mouth once daily for mood.  -Continue Prazosin 2 mg by mouth once nightly at bedtime for nightmares.  -Increase Trintellix to 20 mg by mouth once daily in the morning with breakfast for moods.      MEDICATION ISSUES:  Discussed medication options and treatment plan of prescribed medication, any off label use of medication, as well as the risks, benefits, any black box warnings including increased suicidality, and side effects including but not limited to potential falls, dizziness, possible impaired driving, GI side effects (change in appetite, abdominal discomfort, nausea, vomiting, diarrhea, and/or constipation), dry mouth, somnolence, sedation, insomnia, activation, agitation, irritation, tremors, abnormal muscle movements or disorders, headache, sweating, possible bruising or rare bleeding, electrolyte and/or fluid abnormalities, change in blood pressure/heart rate/and or heart rhythm, sexual dysfunction, and metabolic adversities among others. Patient and/or guardian agreeable to call the office with any worsening of symptoms or onset of side effects, or if any concerns or questions arise.  The contact information for the office is made available to the patient and/or guardian.  Patient and/or guardian agreeable to call 911 or go to the nearest ER should they begin having any SI/HI, or if any urgent concerns arise. No medication side effects or related complaints today.    This APRN has discussed the benefits and risks of taking/continuing Lamictal (Lamotrigine).  The side effects of Lamictal can include a benign rash, blurred or double vision, dizziness, ataxia, sedation, headache, tremor, insomnia, poor coordination, fatigue,  nausea, vomiting, dyspepsia, rhinitis, infection, pharyngitis, asthenia, a rare but serious rash,  rare multi-organ failure associated with Santana-Jean Syndrome, toxic epidermal necrolysis, drug hypersensitivity syndrome, rare blood dyscrasias, rare aseptic meningitis, rare sudden unexplained deaths in people with epilepsy, withdrawal seizures upon abrupt withdrawal, and rare activation of suicidal ideation and behavior (suicidality).  This APRN has discussed that a very slow dose titration when starting, or changing doses, of Lamictal may reduce the incidence of skin rash and other side effects.  The dosage should not be titrated upwards or increased faster than recommended due to the possibility of the discussed side effects and risk of development of a skin rash (which can become life threatening).    This APRN has also discussed that if the patient stops taking the Lamictal for 3-5 days or longer, it will be necessary to restart the drug with an initial dose titration, as rashes have been reported on reexposure.  If the patient and Provider decide to stop the Lamictal, the patient will follow the directions of this APRN/this office as a guided taper over about two weeks is appropriate due to the risk of relapse in bipolar disorder with those with a mood or bipolar disorder, the risk of seizures in those with epilepsy, and discontinuation symptoms upon rapid discontinuation of Lamictal.    The patient verbalizes understanding of benefits and risks as discussed, the patient/guardian feels the benefits outweigh the risks and is agreeable to continue/take Lamictal as discussed.  The patient is advised should any side effects or rash develops they are to stop the Lamictal immediately and contact this APRN/this office or go to the emergency department immediately.  The patient verbalizes understanding and agreement with treatment plan in their own words.      VERBAL INFORMED CONSENT FOR MEDICATION:  The patient was educated that their proposed/prescribed psychotropic medication(s) has potential risks, side  effects, adverse effects, and black box warnings; and these have been discussed with the patient.  The patient has been informed that their treatment and medication dosage is to be individualized, and may even be above or below the recommended range/dosage due to patient individualization and response, but medication is prescribed using a shared decision making approach, and no medication or dosage will be prescribed without the patient's verbal consent.  The reason for the use of the medication including any off label use and alternative modes of treatment other than or in addition to medication has been considered and discussed, the probable consequences of not receiving the proposed treatment have been discussed, and any treatment side effects, black box warnings, and cautions associated with treatment have been discussed with the patient.  The patient is allowed ample time to openly discuss and ask questions regarding the proposed medication(s) and treatment plan and the patient verbalizes understanding the reasons for the use of the medication, its potential risks and benefits, other alternative treatment(s), and the probable consequences that may occur if the proposed medication is not given.  The patient has been given ample time to ask questions and study the information and find the information to be specific, accurate, and complete.  The patient gives verbal consent for the medication(s) proposed/prescribed, they verbalized understanding that they can refuse and withdraw consent at any time with the assistance of this APRN, and the patient has verbally confirmed that they are aware, and are willing, to take the prescribed medication and follow the treatment plan with the known possible risks, side effect, black box warnings, and any potential medication interactions, and the patient reports they will be worse off without this medication and treatment plan.  The patient is advised to contact this APRN/this  office if any questions or concerns arise at any time (at [phone]), or call 911/go to the closest emergency department if needed or outside of office hours.      SUICIDE RISK ASSESSMENT AND SAFETY PLAN: Unalterable demographics and a history of mental health intervention indicate this patient is in a high risk category compared to the general population. At present, the patient denies active SI/HI, intentions, or plans at this time and agrees to seek immediate care should such thoughts develop. The patient verbalizes understanding of how to access emergency care if needed and agrees to do so. Consideration of suicide risk and protective factors such as history, current presentation, individual strengths and weaknesses, psychosocial and environmental stressors and variables, psychiatric illness and symptoms, medical conditions and pain, took place in this interview. Based on those considerations, the patient is determined: within individual baseline and presenting no imminent risk for suicide or homicide. Other recommendations: The patient does not meet the criteria for inpatient admission and is not a safety risk to self or others at today's visit. Inpatient treatment offers no significant advantages over outpatient treatment for this patient at today's visit.  The patient was given ample time for questions and fully participated in treatment planning.  The patient was encouraged to call the clinic with any questions or concerns.  The patient was informed of access to emergency care. If patient were to develop any significant symptomatology, suicidal ideation, homicidal ideation, any concerns, or feel unsafe at any time they are to call the clinic and if unable to get immediate assistance should immediately call 911 or go to the nearest emergency room.  Patient contracted verbally for the following: If you are experiencing an emotional crisis or have thoughts of harming yourself or others, please go to your  nearest local emergency room or call 911. Will continue to re-assess medication response and side effects frequently to establish efficacy and ensure safety. Risks, any black box warnings, side effects, off label usage, and benefits of medication and treatment discussed with patient, along with potential adverse side effects of current and/or newly prescribed medication, alternative treatment options, and OTC medications.  Patient verbalized understanding of potential risks, any off label use of medication, any black box warnings, and any side effects in their own words. The patient verbalized understanding and agreed to comply with the safety plan discussed in their own words.  Patient given the number to the office. Number also discussed of the 24- hour suicide hotline.           MEDS ORDERED DURING VISIT:  New Medications Ordered This Visit   Medications   • Vortioxetine HBr (Trintellix) 20 MG tablet     Sig: Take 1 tablet by mouth Daily With Breakfast.     Dispense:  30 tablet     Refill:  0   • prazosin (MINIPRESS) 2 MG capsule     Sig: Take 1 capsule by mouth Every Night.     Dispense:  30 capsule     Refill:  0   • lamoTRIgine (LaMICtal) 25 MG tablet     Sig: Take 2 tablets by mouth Daily.     Dispense:  60 tablet     Refill:  0   • hydrOXYzine (ATARAX) 25 MG tablet     Sig: Take 1 tablet by mouth At Night As Needed (anxiety and/or sleep).     Dispense:  30 tablet     Refill:  0       Return in about 4 weeks (around 2/1/2023), or if symptoms worsen or fail to improve, for Next scheduled follow up and Recheck.       Progress: Not at goal    Functional Status: Moderate impairment     Prognosis: Good with Ongoing Treatment      Psychotherapy treatment plan: Completed by patient's therapist            This document has been electronically signed by ARVIN Tamez  January 4, 2023 11:32 EST    Some of the data in this electronic note has been brought forward from a previous encounter, any necessary  changes have been made, it has been reviewed by this APRN, and it is accurate.    Please note that portions of this note were completed with a voice recognition program.

## 2023-01-18 ENCOUNTER — TELEMEDICINE (OUTPATIENT)
Dept: PSYCHIATRY | Facility: CLINIC | Age: 28
End: 2023-01-18
Payer: COMMERCIAL

## 2023-01-18 DIAGNOSIS — F33.1 MODERATE EPISODE OF RECURRENT MAJOR DEPRESSIVE DISORDER: Primary | ICD-10-CM

## 2023-01-18 DIAGNOSIS — F41.1 GENERALIZED ANXIETY DISORDER: ICD-10-CM

## 2023-01-18 PROCEDURE — 90834 PSYTX W PT 45 MINUTES: CPT | Performed by: COUNSELOR

## 2023-01-31 ENCOUNTER — TELEMEDICINE (OUTPATIENT)
Dept: PSYCHIATRY | Facility: CLINIC | Age: 28
End: 2023-01-31
Payer: COMMERCIAL

## 2023-01-31 DIAGNOSIS — F41.9 ANXIETY DISORDER, UNSPECIFIED TYPE: Chronic | ICD-10-CM

## 2023-01-31 DIAGNOSIS — G47.9 SLEEPING DIFFICULTIES: ICD-10-CM

## 2023-01-31 DIAGNOSIS — F51.5 NIGHTMARES: ICD-10-CM

## 2023-01-31 DIAGNOSIS — F33.1 MAJOR DEPRESSIVE DISORDER, RECURRENT EPISODE, MODERATE: Primary | Chronic | ICD-10-CM

## 2023-01-31 PROCEDURE — 99214 OFFICE O/P EST MOD 30 MIN: CPT | Performed by: NURSE PRACTITIONER

## 2023-01-31 RX ORDER — LAMOTRIGINE 25 MG/1
75 TABLET ORAL DAILY
Qty: 90 TABLET | Refills: 0 | Status: SHIPPED | OUTPATIENT
Start: 2023-01-31 | End: 2023-02-22 | Stop reason: SDUPTHER

## 2023-01-31 RX ORDER — VORTIOXETINE 20 MG/1
20 TABLET, FILM COATED ORAL
Qty: 30 TABLET | Refills: 0 | Status: SHIPPED | OUTPATIENT
Start: 2023-01-31 | End: 2023-02-22 | Stop reason: SDUPTHER

## 2023-01-31 RX ORDER — HYDROXYZINE HYDROCHLORIDE 25 MG/1
25 TABLET, FILM COATED ORAL NIGHTLY PRN
Qty: 30 TABLET | Refills: 0 | Status: SHIPPED | OUTPATIENT
Start: 2023-01-31 | End: 2023-02-22 | Stop reason: SDUPTHER

## 2023-01-31 RX ORDER — PRAZOSIN HYDROCHLORIDE 2 MG/1
2 CAPSULE ORAL NIGHTLY
Qty: 30 CAPSULE | Refills: 0 | Status: SHIPPED | OUTPATIENT
Start: 2023-01-31 | End: 2023-02-20 | Stop reason: SDUPTHER

## 2023-01-31 NOTE — PROGRESS NOTES
"This provider is located at the [facility] (through [facility] [place] [facility]), [address], [facility], [address] using a secure [employer] Video Visit through [employer]. Patient is being seen remotely via telehealth at their home address in [place], and stated they are in a secure environment for this session. The patient's condition being diagnosed/treated is appropriate for telemedicine. The provider identified herself as well as her credentials.   The patient, and/or patients guardian, consent to be seen remotely, and when consent is given they understand that the consent allows for patient identifiable information to be sent to a third party as needed.   They may refuse to be seen remotely at any time. The electronic data is encrypted and password protected, and the patient and/or guardian has been advised of the potential risks to privacy not withstanding such measures.    You have chosen to receive care through a telehealth visit.  Do you consent to use a video/audio connection for your medical care today? Yes    Patient identifiers utilized: Name and date of birth.    Patient verbally confirmed consent for today's encounter 1/31/23.    Subjective   Maddy Chisholm is a 28 y.o. female who presents today for follow up    Chief Complaint: Medication management follow-up - depression, anxiety, sleeping difficulties, and nightmares follow-up    Accompanied by: The patient is alone at today's encounter    History of Present Illness:   The patient describes her mood as \"pretty good pretty neutral\" over the last few weeks.  The patient reports she feels her moods are improved since increasing the Trintellix dosage, but reports she is still struggling with irritability.  The patient reports she has found when at work if a client or someone is rude to her she acts out irritably immediately such as being delgado in her voice or sometimes even slamming things.  She denies acting out " physically towards anyone.  She reports at home this is not a typical stressor or problem for her.  The patient denies any other irritable behaviors, and denies any impulsive or reckless or harmful behaviors.  The patient rates her symptoms of depression at a 6.5/10 on a 0-10 scale, with 10 being the worst.  The patient rates her symptoms of anxiety at a 6.5/10 on a 0-10 scale, with 10 being the worst.  The patient reports her appetite as good.  The patient reports her sleep as good, but reports she has bad dreams almost every single night.  The patient does not report any new medical problems or changes in medications since last appointment with this facility.  The patient reports compliance with her current medication regimen, but reports shy only takes the as needed hydroxyzine occasionally.  The patient denies any side effects, rashes, or concerns from her current medication regimen.   The patient denies any suicidal or homicidal ideations, plans, or intent at today's encounter and is convincing.  The patient denies any auditory hallucinations or visual hallucinations.  The patient does not endorse any significant symptoms consistent with oskar or psychosis at today's encounter.  The patient reports she would like to adjust her medication at today's encounter to help with irritability and irritable moods which cause her to respond negatively if possible.      Prior Psychiatric Medications:  -Lexapro - decreased her libido so she stopped taking it (can't remember if it helped her mood or not)  -Prozac  -Wellbutrin  -Hydroxyzine  -Lamictal  -Prazosin  -Trintellix      Last Menstrual Period:  1/16/23.  The patient was educated that her prescribed medications can have potential risk to a developing fetus. The patient is advised to contact this APRN/this office if she becomes pregnant or plans to become pregnant.  Pt verbalizes understanding and acknowledged agreement with this plan in her own words.        The  following portions of the patient's history were reviewed and updated as appropriate: allergies, current medications, past family history, past medical history, past social history, past surgical history and problem list.            Past Medical History:  Past Medical History:   Diagnosis Date   • Anemia    • Anxiety    • COVID 2022   • Depression    • Vitamin B12 deficiency        Social History:  Social History     Socioeconomic History   • Marital status: Single   • Number of children: 0   Tobacco Use   • Smoking status: Former     Packs/day: 0.20     Years: 0.25     Pack years: 0.05     Types: Cigarettes     Start date: 2020     Quit date: 8/15/2020     Years since quittin.4   • Smokeless tobacco: Never   • Tobacco comments:     smoked for 5 years  (black and milds for 5 years then restarted 2020 and quit 2020   Vaping Use   • Vaping Use: Never used   Substance and Sexual Activity   • Alcohol use: Yes     Comment: Only occasionally, states not frequently   • Drug use: Not Currently     Comment: The patient states in her past she has used marijuanna, but not currently   • Sexual activity: Yes     Partners: Male     Birth control/protection: OCP       Family History:  Family History   Adopted: Yes       Past Surgical History:  Past Surgical History:   Procedure Laterality Date   • NO PAST SURGERIES         Problem List:  Patient Active Problem List   Diagnosis   • Iron deficiency anemia secondary to inadequate dietary iron intake   • B12 deficiency   • Anxiety   • BV (bacterial vaginosis)   • Allergic rhinitis   • Vitiligo   • Generalized anxiety disorder   • Moderate episode of recurrent major depressive disorder (HCC)   • Restless leg   • Excessive sweating       Allergy:   No Known Allergies     Current Medications:   Current Outpatient Medications   Medication Sig Dispense Refill   • hydrOXYzine (ATARAX) 25 MG tablet Take 1 tablet by mouth At Night As Needed (anxiety and/or sleep). 30 tablet 0    • lamoTRIgine (LaMICtal) 25 MG tablet Take 3 tablets by mouth Daily. 90 tablet 0   • prazosin (MINIPRESS) 2 MG capsule Take 1 capsule by mouth Every Night. 30 capsule 0   • Vortioxetine HBr (Trintellix) 20 MG tablet Take 1 tablet by mouth Daily With Breakfast. 30 tablet 0   • aluminum chloride (DRYSOL) 20 % external solution Apply  topically to the appropriate area as directed Every Night. 60 mL 1   • drospirenone-ethinyl estradiol (TICO,OCELLA) 3-0.03 MG per tablet Take 1 tablet by mouth Daily. 30 tablet 5   • fluticasone (FLONASE) 50 MCG/ACT nasal spray 2 sprays by Each Nare route Daily. 16 g 3     No current facility-administered medications for this visit.       Review of Symptoms:    Review of Systems   Psychiatric/Behavioral: Positive for agitation, decreased concentration, sleep disturbance (improved with medication), depressed mood and stress. Negative for behavioral problems, dysphoric mood, hallucinations, self-injury, suicidal ideas and negative for hyperactivity. The patient is nervous/anxious.          Physical Exam:   not currently breastfeeding. There is no height or weight on file to calculate BMI.   Due to the remote nature of this encounter (virtual encounter), vitals were unable to be obtained.  Height stated at 66.5 inches.  Weight stated around 170-176 pounds.      Physical Exam  Neurological:      Mental Status: She is alert and oriented to person, place, and time.   Psychiatric:         Attention and Perception: Attention normal.         Mood and Affect: Affect normal. Mood is anxious.         Speech: Speech normal.         Behavior: Behavior is cooperative.         Thought Content: Thought content normal. Thought content is not paranoid or delusional. Thought content does not include homicidal or suicidal ideation. Thought content does not include homicidal or suicidal plan.         Cognition and Memory: Cognition and memory normal.         Judgment: Judgment normal.         Mental Status  Exam:   Hygiene:   good  Cooperation:  Cooperative  Eye Contact:  Good  Psychomotor Behavior:  Appropriate  Affect:  Appropriate  Mood: anxious  Hopelessness: Denies  Speech:  Normal  Thought Process:  Linear  Thought Content:  Mood congruent  Suicidal:  None  Homicidal:  None  Hallucinations:  None  Delusion:  None  Memory:  Intact  Orientation:  Person, Place, Time and Situation  Reliability:  good  Insight:  Good  Judgement:  Good  Impulse Control:  Good  Physical/Medical Issues:  No          Lab Results:   No visits with results within 1 Month(s) from this visit.   Latest known visit with results is:   Lab on 08/01/2022   Component Date Value Ref Range Status   • WBC 08/01/2022 7.23  3.40 - 10.80 10*3/mm3 Final   • RBC 08/01/2022 5.05  3.77 - 5.28 10*6/mm3 Final   • Hemoglobin 08/01/2022 12.2  12.0 - 15.9 g/dL Final   • Hematocrit 08/01/2022 37.6  34.0 - 46.6 % Final   • MCV 08/01/2022 74.5 (L)  79.0 - 97.0 fL Final   • MCH 08/01/2022 24.2 (L)  26.6 - 33.0 pg Final   • MCHC 08/01/2022 32.4  31.5 - 35.7 g/dL Final   • RDW 08/01/2022 14.4  12.3 - 15.4 % Final   • RDW-SD 08/01/2022 38.4  37.0 - 54.0 fl Final   • MPV 08/01/2022 10.6  6.0 - 12.0 fL Final   • Platelets 08/01/2022 256  140 - 450 10*3/mm3 Final   • Ferritin 08/01/2022 28.70  13.00 - 150.00 ng/mL Final   • Vitamin B-12 08/01/2022 686  211 - 946 pg/mL Final         Assessment & Plan   Problems Addressed this Visit    None  Visit Diagnoses     Major depressive disorder, recurrent episode, moderate (HCC)  (Chronic)   -  Primary    R/O BPD R/O Mood d/o    Relevant Medications    Vortioxetine HBr (Trintellix) 20 MG tablet    lamoTRIgine (LaMICtal) 25 MG tablet    hydrOXYzine (ATARAX) 25 MG tablet    Anxiety disorder, unspecified type  (Chronic)       Relevant Medications    Vortioxetine HBr (Trintellix) 20 MG tablet    hydrOXYzine (ATARAX) 25 MG tablet    Nightmares        Relevant Medications    Vortioxetine HBr (Trintellix) 20 MG tablet    prazosin  (MINIPRESS) 2 MG capsule    hydrOXYzine (ATARAX) 25 MG tablet    Sleeping difficulties        Relevant Medications    hydrOXYzine (ATARAX) 25 MG tablet      Diagnoses       Codes Comments    Major depressive disorder, recurrent episode, moderate (HCC)    -  Primary ICD-10-CM: F33.1  ICD-9-CM: 296.32 R/O BPD R/O Mood d/o    Anxiety disorder, unspecified type     ICD-10-CM: F41.9  ICD-9-CM: 300.00     Nightmares     ICD-10-CM: F51.5  ICD-9-CM: 307.47     Sleeping difficulties     ICD-10-CM: G47.9  ICD-9-CM: 780.50           Visit Diagnoses:    ICD-10-CM ICD-9-CM   1. Major depressive disorder, recurrent episode, moderate (HCC)  F33.1 296.32   2. Anxiety disorder, unspecified type  F41.9 300.00   3. Nightmares  F51.5 307.47   4. Sleeping difficulties  G47.9 780.50          GOALS:  Short Term Goals: Patient will be compliant with medication, and patient will have no significant medication related side effects.  Patient will be engaged in psychotherapy as indicated.  Patient will report subjective improvement of symptoms.  Long term goals: To stabilize mood and treat/improve subjective symptoms, the patient will stay out of the hospital, the patient will be at an optimal level of functioning, and the patient will take all medications as prescribed.  The patient verbalized understanding and agreement with goals that were mutually set.      TREATMENT PLAN: Continue supportive psychotherapy efforts and take medications as indicated.  Medication and treatment options, both pharmacological and non-pharmacological treatment options, discussed during today's visit, including any off label use of medication. Patient acknowledged and verbally consented with current treatment plan and was educated on the importance of compliance with treatment and follow-up appointments.      -Continue hydroxyzine 25 mg by mouth once daily at bedtime as needed for sleep and/or anxiety.  -Increase lamotrigine to 75 mg by mouth once daily for  mood.  -Continue Prazosin 2 mg by mouth once nightly at bedtime for nightmares.  -Continue Trintellix 20 mg by mouth once daily in the morning with breakfast for moods.      MEDICATION ISSUES:  Discussed medication options and treatment plan of prescribed medication, any off label use of medication, as well as the risks, benefits, any black box warnings including increased suicidality, and side effects including but not limited to potential falls, dizziness, possible impaired driving, GI side effects (change in appetite, abdominal discomfort, nausea, vomiting, diarrhea, and/or constipation), dry mouth, somnolence, sedation, insomnia, activation, agitation, irritation, tremors, abnormal muscle movements or disorders, headache, sweating, possible bruising or rare bleeding, electrolyte and/or fluid abnormalities, change in blood pressure/heart rate/and or heart rhythm, sexual dysfunction, and metabolic adversities among others. Patient and/or guardian agreeable to call the office with any worsening of symptoms or onset of side effects, or if any concerns or questions arise.  The contact information for the office is made available to the patient and/or guardian.  Patient and/or guardian agreeable to call 911 or go to the nearest ER should they begin having any SI/HI, or if any urgent concerns arise. No medication side effects or related complaints today.    This APRN has discussed the benefits and risks of taking/continuing Lamictal (Lamotrigine).  The side effects of Lamictal can include a benign rash, blurred or double vision, dizziness, ataxia, sedation, headache, tremor, insomnia, poor coordination, fatigue,  nausea, vomiting, dyspepsia, rhinitis, infection, pharyngitis, asthenia, a rare but serious rash, rare multi-organ failure associated with Santana-Jean Syndrome, toxic epidermal necrolysis, drug hypersensitivity syndrome, rare blood dyscrasias, rare aseptic meningitis, rare sudden unexplained deaths in  people with epilepsy, withdrawal seizures upon abrupt withdrawal, and rare activation of suicidal ideation and behavior (suicidality).  This APRN has discussed that a very slow dose titration when starting, or changing doses, of Lamictal may reduce the incidence of skin rash and other side effects.  The dosage should not be titrated upwards or increased faster than recommended due to the possibility of the discussed side effects and risk of development of a skin rash (which can become life threatening).    This APRN has also discussed that if the patient stops taking the Lamictal for 3-5 days or longer, it will be necessary to restart the drug with an initial dose titration, as rashes have been reported on reexposure.  If the patient and Provider decide to stop the Lamictal, the patient will follow the directions of this APRN/this office as a guided taper over about two weeks is appropriate due to the risk of relapse in bipolar disorder with those with a mood or bipolar disorder, the risk of seizures in those with epilepsy, and discontinuation symptoms upon rapid discontinuation of Lamictal.    The patient verbalizes understanding of benefits and risks as discussed, the patient/guardian feels the benefits outweigh the risks and is agreeable to continue/take Lamictal as discussed.  The patient is advised should any side effects or rash develops they are to stop the Lamictal immediately and contact this APRN/this office or go to the emergency department immediately.  The patient verbalizes understanding and agreement with treatment plan in their own words.      VERBAL INFORMED CONSENT FOR MEDICATION:  The patient was educated that their proposed/prescribed psychotropic medication(s) has potential risks, side effects, adverse effects, and black box warnings; and these have been discussed with the patient.  The patient has been informed that their treatment and medication dosage is to be individualized, and may even be  above or below the recommended range/dosage due to patient individualization and response, but medication is prescribed using a shared decision making approach, and no medication or dosage will be prescribed without the patient's verbal consent.  The reason for the use of the medication including any off label use and alternative modes of treatment other than or in addition to medication has been considered and discussed, the probable consequences of not receiving the proposed treatment have been discussed, and any treatment side effects, black box warnings, and cautions associated with treatment have been discussed with the patient.  The patient is allowed ample time to openly discuss and ask questions regarding the proposed medication(s) and treatment plan and the patient verbalizes understanding the reasons for the use of the medication, its potential risks and benefits, other alternative treatment(s), and the probable consequences that may occur if the proposed medication is not given.  The patient has been given ample time to ask questions and study the information and find the information to be specific, accurate, and complete.  The patient gives verbal consent for the medication(s) proposed/prescribed, they verbalized understanding that they can refuse and withdraw consent at any time with the assistance of this APRN, and the patient has verbally confirmed that they are aware, and are willing, to take the prescribed medication and follow the treatment plan with the known possible risks, side effect, black box warnings, and any potential medication interactions, and the patient reports they will be worse off without this medication and treatment plan.  The patient is advised to contact this APRN/this office if any questions or concerns arise at any time (at [phone]), or call 911/go to the closest emergency department if needed or outside of office hours.      SUICIDE RISK ASSESSMENT AND SAFETY PLAN:  Unalterable demographics and a history of mental health intervention indicate this patient is in a high risk category compared to the general population. At present, the patient denies active SI/HI, intentions, or plans at this time and agrees to seek immediate care should such thoughts develop. The patient verbalizes understanding of how to access emergency care if needed and agrees to do so. Consideration of suicide risk and protective factors such as history, current presentation, individual strengths and weaknesses, psychosocial and environmental stressors and variables, psychiatric illness and symptoms, medical conditions and pain, took place in this interview. Based on those considerations, the patient is determined: within individual baseline and presenting no imminent risk for suicide or homicide. Other recommendations: The patient does not meet the criteria for inpatient admission and is not a safety risk to self or others at today's visit. Inpatient treatment offers no significant advantages over outpatient treatment for this patient at today's visit.  The patient was given ample time for questions and fully participated in treatment planning.  The patient was encouraged to call the clinic with any questions or concerns.  The patient was informed of access to emergency care. If patient were to develop any significant symptomatology, suicidal ideation, homicidal ideation, any concerns, or feel unsafe at any time they are to call the clinic and if unable to get immediate assistance should immediately call 911 or go to the nearest emergency room.  Patient contracted verbally for the following: If you are experiencing an emotional crisis or have thoughts of harming yourself or others, please go to your nearest local emergency room or call 911. Will continue to re-assess medication response and side effects frequently to establish efficacy and ensure safety. Risks, any black box warnings, side effects, off  label usage, and benefits of medication and treatment discussed with patient, along with potential adverse side effects of current and/or newly prescribed medication, alternative treatment options, and OTC medications.  Patient verbalized understanding of potential risks, any off label use of medication, any black box warnings, and any side effects in their own words. The patient verbalized understanding and agreed to comply with the safety plan discussed in their own words.  Patient given the number to the office. Number also discussed of the 24- hour suicide hotline.           MEDS ORDERED DURING VISIT:  New Medications Ordered This Visit   Medications   • Vortioxetine HBr (Trintellix) 20 MG tablet     Sig: Take 1 tablet by mouth Daily With Breakfast.     Dispense:  30 tablet     Refill:  0   • prazosin (MINIPRESS) 2 MG capsule     Sig: Take 1 capsule by mouth Every Night.     Dispense:  30 capsule     Refill:  0   • lamoTRIgine (LaMICtal) 25 MG tablet     Sig: Take 3 tablets by mouth Daily.     Dispense:  90 tablet     Refill:  0   • hydrOXYzine (ATARAX) 25 MG tablet     Sig: Take 1 tablet by mouth At Night As Needed (anxiety and/or sleep).     Dispense:  30 tablet     Refill:  0       Return in about 3 weeks (around 2/21/2023), or if symptoms worsen or fail to improve, for Next scheduled follow up and Recheck.       Progress: Not at goal    Functional Status: Moderate impairment     Prognosis: Good with Ongoing Treatment      Psychotherapy treatment plan: Completed by patient's therapist            This document has been electronically signed by ARVIN Tamez  January 31, 2023 11:37 EST    Some of the data in this electronic note has been brought forward from a previous encounter, any necessary changes have been made, it has been reviewed by this APRN, and it is accurate.    Please note that portions of this note were completed with a voice recognition program.

## 2023-02-09 ENCOUNTER — TELEPHONE (OUTPATIENT)
Dept: PSYCHIATRY | Facility: CLINIC | Age: 28
End: 2023-02-09
Payer: COMMERCIAL

## 2023-02-09 NOTE — TELEPHONE ENCOUNTER
Patient called , last week she went off of 2mg Prazosin - she is ready to start back and wants to what mg to start back on 1mg or 2mg??      Please Advise?        Thank you

## 2023-02-09 NOTE — TELEPHONE ENCOUNTER
Have her take Prazosin 1 mg by mouth once nightly at this time, and she can go back up to 2 mg of Prazosin by mouth once nightly in a week if she needs it.

## 2023-02-09 NOTE — TELEPHONE ENCOUNTER
Called patient with information from Cristina Mena - patient agreed with recommendation.      Thank You

## 2023-02-20 ENCOUNTER — TELEPHONE (OUTPATIENT)
Dept: PSYCHIATRY | Facility: CLINIC | Age: 28
End: 2023-02-20
Payer: COMMERCIAL

## 2023-02-20 DIAGNOSIS — F51.5 NIGHTMARES: ICD-10-CM

## 2023-02-20 RX ORDER — PRAZOSIN HYDROCHLORIDE 1 MG/1
1 CAPSULE ORAL NIGHTLY
Qty: 30 CAPSULE | Refills: 0 | Status: SHIPPED | OUTPATIENT
Start: 2023-02-20 | End: 2023-03-22 | Stop reason: SDUPTHER

## 2023-02-22 ENCOUNTER — TELEMEDICINE (OUTPATIENT)
Dept: PSYCHIATRY | Facility: CLINIC | Age: 28
End: 2023-02-22
Payer: COMMERCIAL

## 2023-02-22 DIAGNOSIS — F41.9 ANXIETY DISORDER, UNSPECIFIED TYPE: Chronic | ICD-10-CM

## 2023-02-22 DIAGNOSIS — F51.5 NIGHTMARES: ICD-10-CM

## 2023-02-22 DIAGNOSIS — F33.1 MAJOR DEPRESSIVE DISORDER, RECURRENT EPISODE, MODERATE: Primary | Chronic | ICD-10-CM

## 2023-02-22 DIAGNOSIS — G47.9 SLEEPING DIFFICULTIES: ICD-10-CM

## 2023-02-22 PROCEDURE — 99214 OFFICE O/P EST MOD 30 MIN: CPT | Performed by: NURSE PRACTITIONER

## 2023-02-22 RX ORDER — VORTIOXETINE 20 MG/1
20 TABLET, FILM COATED ORAL
Qty: 30 TABLET | Refills: 0 | Status: SHIPPED | OUTPATIENT
Start: 2023-02-22 | End: 2023-03-22 | Stop reason: SDUPTHER

## 2023-02-22 RX ORDER — HYDROXYZINE HYDROCHLORIDE 25 MG/1
25 TABLET, FILM COATED ORAL NIGHTLY PRN
Qty: 30 TABLET | Refills: 0 | Status: SHIPPED | OUTPATIENT
Start: 2023-02-22 | End: 2023-03-22 | Stop reason: SDUPTHER

## 2023-02-22 RX ORDER — LAMOTRIGINE 25 MG/1
75 TABLET ORAL DAILY
Qty: 90 TABLET | Refills: 0 | Status: SHIPPED | OUTPATIENT
Start: 2023-02-22 | End: 2023-03-22 | Stop reason: SDUPTHER

## 2023-02-22 NOTE — PROGRESS NOTES
"This provider is located at the [facility] (through [facility]), [address], [facility], [address] using a secure [employer] Video Visit through [employer]. Patient is being seen remotely via telehealth at their home address in [place], and stated they are in a secure environment for this session. The patient's condition being diagnosed/treated is appropriate for telemedicine. The provider identified herself as well as her credentials.   The patient, and/or patients guardian, consent to be seen remotely, and when consent is given they understand that the consent allows for patient identifiable information to be sent to a third party as needed.   They may refuse to be seen remotely at any time. The electronic data is encrypted and password protected, and the patient and/or guardian has been advised of the potential risks to privacy not withstanding such measures.    You have chosen to receive care through a telehealth visit.  Do you consent to use a video/audio connection for your medical care today? Yes    Patient identifiers utilized: Name and date of birth.    Patient verbally confirmed consent for today's encounter 2/22/23.    Subjective   Maddy Chisholm is a 28 y.o. female who presents today for follow up    Chief Complaint: Medication management follow-up - Mood/depression, anxiety, sleeping difficulties, and nightmares follow-up    Accompanied by: The patient is alone at today's encounter    History of Present Illness:   The patient describes her mood as \"pretty neutral\" since her last encounter with this APRN.  The patient reports she got off track with her prazosin and had not been taking it.  The patient reports she called a few days ago for a refill of prazosin, and reports she did not want to restart prazosin at 2 mg and wanted to back down to the 1 mg prazosin dosage until she got that dosage back in her system.  The patient reports she is interested in " trying to increase her prazosin dosage at future encounters as needed to help with her reported nightmares.  The patient rates her symptoms of depression at a 5/10 on a 0-10 scale, with 10 being the worst.  The patient rates her symptoms of anxiety at a 5/10 on a 0-10 scale, with 10 being the worst.  The patient reports she is having more good days and bad days.  The patient reports things at home and with her significant other are going good, but reports she can still become irritable easily at work sometimes.  The patient reports the recent increase in lamotrigine has helped with this.  The patient reports her appetite as good and at her typical baseline.  The patient reports her sleep as worsened since being off of prazosin.  The patient reports continued nightmares and vivid dreams.  The patient reports when she was taking prazosin her sleep had improved some.  The patient reports the 1 mg prazosin dosage that was sent in two days ago has not been picked up, but she plans to pick it up later today.  The patient denies any new medical problems or changes in medications since last appointment with this facility.  The patient reports compliance with her current medication regimen other than the prazosin getting off track recently.  The patient denies any side effects, rashes, or concerns from her current medication regimen.  The patient denies any auditory hallucinations or visual hallucinations.  The patient does not endorse any significant symptoms consistent with oskar or psychosis at today's encounter.  The patient denies any suicidal or homicidal ideations, plans, or intent at today's encounter and is convincing.  The patient reports she would like to not adjust or change her current psychotropic medication regimen at today's encounter as she will be restarting the prazosin at 1 mg by mouth once nightly starting tonight, and then she would like to wait and see how that does before making further medication  changes if needed.  The patient reports she has not seen her therapist in a while because there is nothing new going on in her life.  The patient reports her therapist is wanting her to work on some things that she has not been working on, and she reports feeling like she does not need to see the therapist until she starts working on these things that they have been discussing.  The patient does verbally contract for safety today's encounter and reports she will reach out to this APRN/her therapist/this office prior to any next scheduled appointments if there were any new psychiatric symptoms, worsening of mood, SI/HI, or any concerns.        All Known Prior Psychiatric Medications:  -Lexapro - decreased her libido so she stopped taking it (can't remember if it helped her mood or not)  -Prozac  -Wellbutrin  -Hydroxyzine  -Lamictal  -Prazosin  -Trintellix      Last Menstrual Period:  23.  The patient was educated that her prescribed medications can have potential risk to a developing fetus. The patient is advised to contact this APRN/this office if she becomes pregnant or plans to become pregnant.  Pt verbalizes understanding and acknowledged agreement with this plan in her own words.        The following portions of the patient's history were reviewed and updated as appropriate: allergies, current medications, past family history, past medical history, past social history, past surgical history and problem list.            Past Medical History:  Past Medical History:   Diagnosis Date   • Anemia    • Anxiety    • COVID 2022   • Depression    • Vitamin B12 deficiency        Social History:  Social History     Socioeconomic History   • Marital status: Single   • Number of children: 0   Tobacco Use   • Smoking status: Former     Packs/day: 0.20     Years: 0.25     Pack years: 0.05     Types: Cigarettes     Start date: 2020     Quit date: 8/15/2020     Years since quittin.5   • Smokeless tobacco: Never    • Tobacco comments:     smoked for 5 years  (black and milds for 5 years then restarted 5/2020 and quit 8/2020   Vaping Use   • Vaping Use: Never used   Substance and Sexual Activity   • Alcohol use: Yes     Comment: rarely   • Drug use: Not Currently     Comment: The patient states in her past she has used marijuanna, but not currently   • Sexual activity: Yes     Partners: Male     Birth control/protection: OCP       Family History:  Family History   Adopted: Yes       Past Surgical History:  Past Surgical History:   Procedure Laterality Date   • NO PAST SURGERIES         Problem List:  Patient Active Problem List   Diagnosis   • Iron deficiency anemia secondary to inadequate dietary iron intake   • B12 deficiency   • Anxiety   • BV (bacterial vaginosis)   • Allergic rhinitis   • Vitiligo   • Generalized anxiety disorder   • Moderate episode of recurrent major depressive disorder (HCC)   • Restless leg   • Excessive sweating       Allergy:   No Known Allergies     Current Medications:   Current Outpatient Medications   Medication Sig Dispense Refill   • hydrOXYzine (ATARAX) 25 MG tablet Take 1 tablet by mouth At Night As Needed (anxiety and/or sleep). 30 tablet 0   • lamoTRIgine (LaMICtal) 25 MG tablet Take 3 tablets by mouth Daily. 90 tablet 0   • Vortioxetine HBr (Trintellix) 20 MG tablet Take 1 tablet by mouth Daily With Breakfast. 30 tablet 0   • aluminum chloride (DRYSOL) 20 % external solution Apply  topically to the appropriate area as directed Every Night. 60 mL 1   • drospirenone-ethinyl estradiol (TICO,OCELLA) 3-0.03 MG per tablet Take 1 tablet by mouth Daily. 30 tablet 5   • fluticasone (FLONASE) 50 MCG/ACT nasal spray 2 sprays by Each Nare route Daily. 16 g 3   • prazosin (MINIPRESS) 1 MG capsule Take 1 capsule by mouth Every Night. 30 capsule 0     No current facility-administered medications for this visit.       Review of Symptoms:    Review of Systems   Psychiatric/Behavioral: Positive for  decreased concentration, sleep disturbance (improved with medication), depressed mood and stress. Negative for agitation, behavioral problems, dysphoric mood, hallucinations, self-injury, suicidal ideas and negative for hyperactivity. The patient is nervous/anxious.          Physical Exam:   not currently breastfeeding. There is no height or weight on file to calculate BMI.   Due to the remote nature of this encounter (virtual encounter), vitals were unable to be obtained.  Height stated at 66.5 inches.  Weight stated around 170-176 pounds.      Physical Exam  Neurological:      Mental Status: She is alert and oriented to person, place, and time.   Psychiatric:         Attention and Perception: Attention normal.         Mood and Affect: Affect normal. Mood is anxious.         Speech: Speech normal.         Behavior: Behavior is cooperative.         Thought Content: Thought content normal. Thought content is not paranoid or delusional. Thought content does not include homicidal or suicidal ideation. Thought content does not include homicidal or suicidal plan.         Cognition and Memory: Cognition and memory normal.         Judgment: Judgment normal.         Mental Status Exam:   Hygiene:   good  Cooperation:  Cooperative  Eye Contact:  Good  Psychomotor Behavior:  Restless  Affect:  Appropriate  Mood: anxious  Hopelessness: Denies  Speech:  Normal  Thought Process:  Linear  Thought Content:  Mood congruent  Suicidal:  None  Homicidal:  None  Hallucinations:  None  Delusion:  None  Memory:  Intact  Orientation:  Person, Place, Time and Situation  Reliability:  good  Insight:  Good  Judgement:  Good  Impulse Control:  Good  Physical/Medical Issues:  No          Lab Results:   No visits with results within 1 Month(s) from this visit.   Latest known visit with results is:   Lab on 08/01/2022   Component Date Value Ref Range Status   • WBC 08/01/2022 7.23  3.40 - 10.80 10*3/mm3 Final   • RBC 08/01/2022 5.05  3.77 - 5.28  10*6/mm3 Final   • Hemoglobin 08/01/2022 12.2  12.0 - 15.9 g/dL Final   • Hematocrit 08/01/2022 37.6  34.0 - 46.6 % Final   • MCV 08/01/2022 74.5 (L)  79.0 - 97.0 fL Final   • MCH 08/01/2022 24.2 (L)  26.6 - 33.0 pg Final   • MCHC 08/01/2022 32.4  31.5 - 35.7 g/dL Final   • RDW 08/01/2022 14.4  12.3 - 15.4 % Final   • RDW-SD 08/01/2022 38.4  37.0 - 54.0 fl Final   • MPV 08/01/2022 10.6  6.0 - 12.0 fL Final   • Platelets 08/01/2022 256  140 - 450 10*3/mm3 Final   • Ferritin 08/01/2022 28.70  13.00 - 150.00 ng/mL Final   • Vitamin B-12 08/01/2022 686  211 - 946 pg/mL Final         Assessment & Plan   Problems Addressed this Visit    None  Visit Diagnoses     Major depressive disorder, recurrent episode, moderate (HCC)  (Chronic)   -  Primary    R/O BPD R/O Mood d/o    Relevant Medications    Vortioxetine HBr (Trintellix) 20 MG tablet    lamoTRIgine (LaMICtal) 25 MG tablet    hydrOXYzine (ATARAX) 25 MG tablet    Anxiety disorder, unspecified type  (Chronic)       Relevant Medications    Vortioxetine HBr (Trintellix) 20 MG tablet    hydrOXYzine (ATARAX) 25 MG tablet    Sleeping difficulties        Relevant Medications    hydrOXYzine (ATARAX) 25 MG tablet    Nightmares        Relevant Medications    Vortioxetine HBr (Trintellix) 20 MG tablet    hydrOXYzine (ATARAX) 25 MG tablet      Diagnoses       Codes Comments    Major depressive disorder, recurrent episode, moderate (HCC)    -  Primary ICD-10-CM: F33.1  ICD-9-CM: 296.32 R/O BPD R/O Mood d/o    Anxiety disorder, unspecified type     ICD-10-CM: F41.9  ICD-9-CM: 300.00     Sleeping difficulties     ICD-10-CM: G47.9  ICD-9-CM: 780.50     Nightmares     ICD-10-CM: F51.5  ICD-9-CM: 307.47           Visit Diagnoses:    ICD-10-CM ICD-9-CM   1. Major depressive disorder, recurrent episode, moderate (HCC)  F33.1 296.32   2. Anxiety disorder, unspecified type  F41.9 300.00   3. Sleeping difficulties  G47.9 780.50   4. Nightmares  F51.5 307.47          GOALS:  Short Term Goals:  Patient will be compliant with medication, and patient will have no significant medication related side effects.  Patient will be engaged in psychotherapy as indicated.  Patient will report subjective improvement of symptoms.  Long term goals: To stabilize mood and treat/improve subjective symptoms, the patient will stay out of the hospital, the patient will be at an optimal level of functioning, and the patient will take all medications as prescribed.  The patient verbalized understanding and agreement with goals that were mutually set.      TREATMENT PLAN: Continue supportive psychotherapy efforts and take medications as indicated.  Medication and treatment options, both pharmacological and non-pharmacological treatment options, discussed during today's visit, including any off label use of medication. Patient acknowledged and verbally consented with current treatment plan and was educated on the importance of compliance with treatment and follow-up appointments.      -Continue hydroxyzine 25 mg by mouth once daily at bedtime as needed for sleep and/or anxiety.  -Continue lamotrigine 75 mg by mouth once daily for mood.  -Continue Prazosin at recently decreased dosage of 1 mg by mouth once nightly at bedtime for nightmares.  -Continue Trintellix 20 mg by mouth once daily in the morning with breakfast for moods.      MEDICATION ISSUES:  Discussed medication options and treatment plan of prescribed medication, any off label use of medication, as well as the risks, benefits, any black box warnings including increased suicidality, and side effects including but not limited to potential falls, dizziness, possible impaired driving, GI side effects (change in appetite, abdominal discomfort, nausea, vomiting, diarrhea, and/or constipation), dry mouth, somnolence, sedation, insomnia, activation, agitation, irritation, tremors, abnormal muscle movements or disorders, headache, sweating, possible bruising or rare bleeding,  electrolyte and/or fluid abnormalities, change in blood pressure/heart rate/and or heart rhythm, sexual dysfunction, and metabolic adversities among others. Patient and/or guardian agreeable to call the office with any worsening of symptoms or onset of side effects, or if any concerns or questions arise.  The contact information for the office is made available to the patient and/or guardian.  Patient and/or guardian agreeable to call 911 or go to the nearest ER should they begin having any SI/HI, or if any urgent concerns arise. No medication side effects or related complaints today.    This APRN has discussed the benefits and risks of taking/continuing Lamictal (Lamotrigine).  The side effects of Lamictal can include a benign rash, blurred or double vision, dizziness, ataxia, sedation, headache, tremor, insomnia, poor coordination, fatigue,  nausea, vomiting, dyspepsia, rhinitis, infection, pharyngitis, asthenia, a rare but serious rash, rare multi-organ failure associated with Santana-Jean Syndrome, toxic epidermal necrolysis, drug hypersensitivity syndrome, rare blood dyscrasias, rare aseptic meningitis, rare sudden unexplained deaths in people with epilepsy, withdrawal seizures upon abrupt withdrawal, and rare activation of suicidal ideation and behavior (suicidality).  This APRN has discussed that a very slow dose titration when starting, or changing doses, of Lamictal may reduce the incidence of skin rash and other side effects.  The dosage should not be titrated upwards or increased faster than recommended due to the possibility of the discussed side effects and risk of development of a skin rash (which can become life threatening).    This APRN has also discussed that if the patient stops taking the Lamictal for 3-5 days or longer, it will be necessary to restart the drug with an initial dose titration, as rashes have been reported on reexposure.  If the patient and Provider decide to stop the Lamictal,  the patient will follow the directions of this APRN/this office as a guided taper over about two weeks is appropriate due to the risk of relapse in bipolar disorder with those with a mood or bipolar disorder, the risk of seizures in those with epilepsy, and discontinuation symptoms upon rapid discontinuation of Lamictal.    The patient verbalizes understanding of benefits and risks as discussed, the patient/guardian feels the benefits outweigh the risks and is agreeable to continue/take Lamictal as discussed.  The patient is advised should any side effects or rash develops they are to stop the Lamictal immediately and contact this APRN/this office or go to the emergency department immediately.  The patient verbalizes understanding and agreement with treatment plan in their own words.      VERBAL INFORMED CONSENT FOR MEDICATION:  The patient was educated that their proposed/prescribed psychotropic medication(s) has potential risks, side effects, adverse effects, and black box warnings; and these have been discussed with the patient.  The patient has been informed that their treatment and medication dosage is to be individualized, and may even be above or below the recommended range/dosage due to patient individualization and response, but medication is prescribed using a shared decision making approach, and no medication or dosage will be prescribed without the patient's verbal consent.  The reason for the use of the medication including any off label use and alternative modes of treatment other than or in addition to medication has been considered and discussed, the probable consequences of not receiving the proposed treatment have been discussed, and any treatment side effects, black box warnings, and cautions associated with treatment have been discussed with the patient.  The patient is allowed ample time to openly discuss and ask questions regarding the proposed medication(s) and treatment plan and the patient  verbalizes understanding the reasons for the use of the medication, its potential risks and benefits, other alternative treatment(s), and the probable consequences that may occur if the proposed medication is not given.  The patient has been given ample time to ask questions and study the information and find the information to be specific, accurate, and complete.  The patient gives verbal consent for the medication(s) proposed/prescribed, they verbalized understanding that they can refuse and withdraw consent at any time with the assistance of this APRN, and the patient has verbally confirmed that they are aware, and are willing, to take the prescribed medication and follow the treatment plan with the known possible risks, side effect, black box warnings, and any potential medication interactions, and the patient reports they will be worse off without this medication and treatment plan.  The patient is advised to contact this APRN/this office if any questions or concerns arise at any time (at [phone]), or call 911/go to the closest emergency department if needed or outside of office hours.      SUICIDE RISK ASSESSMENT AND SAFETY PLAN: Unalterable demographics and a history of mental health intervention indicate this patient is in a high risk category compared to the general population. At present, the patient denies active SI/HI, intentions, or plans at this time and agrees to seek immediate care should such thoughts develop. The patient verbalizes understanding of how to access emergency care if needed and agrees to do so. Consideration of suicide risk and protective factors such as history, current presentation, individual strengths and weaknesses, psychosocial and environmental stressors and variables, psychiatric illness and symptoms, medical conditions and pain, took place in this interview. Based on those considerations, the patient is determined: within individual baseline and presenting no imminent  risk for suicide or homicide. Other recommendations: The patient does not meet the criteria for inpatient admission and is not a safety risk to self or others at today's visit. Inpatient treatment offers no significant advantages over outpatient treatment for this patient at today's visit.  The patient was given ample time for questions and fully participated in treatment planning.  The patient was encouraged to call the clinic with any questions or concerns.  The patient was informed of access to emergency care. If patient were to develop any significant symptomatology, suicidal ideation, homicidal ideation, any concerns, or feel unsafe at any time they are to call the clinic and if unable to get immediate assistance should immediately call 911 or go to the nearest emergency room.  Patient contracted verbally for the following: If you are experiencing an emotional crisis or have thoughts of harming yourself or others, please go to your nearest local emergency room or call 911. Will continue to re-assess medication response and side effects frequently to establish efficacy and ensure safety. Risks, any black box warnings, side effects, off label usage, and benefits of medication and treatment discussed with patient, along with potential adverse side effects of current and/or newly prescribed medication, alternative treatment options, and OTC medications.  Patient verbalized understanding of potential risks, any off label use of medication, any black box warnings, and any side effects in their own words. The patient verbalized understanding and agreed to comply with the safety plan discussed in their own words.  Patient given the number to the office. Number also discussed of the 24- hour suicide hotline.           MEDS ORDERED DURING VISIT:  New Medications Ordered This Visit   Medications   • Vortioxetine HBr (Trintellix) 20 MG tablet     Sig: Take 1 tablet by mouth Daily With Breakfast.     Dispense:  30 tablet      Refill:  0   • lamoTRIgine (LaMICtal) 25 MG tablet     Sig: Take 3 tablets by mouth Daily.     Dispense:  90 tablet     Refill:  0   • hydrOXYzine (ATARAX) 25 MG tablet     Sig: Take 1 tablet by mouth At Night As Needed (anxiety and/or sleep).     Dispense:  30 tablet     Refill:  0       Return in about 4 weeks (around 3/22/2023), or if symptoms worsen or fail to improve, for Next scheduled follow up and Recheck.       Progress: Not at goal    Functional Status: Moderate impairment     Prognosis: Good with Ongoing Treatment      Psychotherapy treatment plan: Completed by patient's therapist            This document has been electronically signed by ARVIN Tamez  February 22, 2023 13:55 EST    Some of the data in this electronic note has been brought forward from a previous encounter, any necessary changes have been made, it has been reviewed by this APRN, and it is accurate.    Please note that portions of this note were completed with a voice recognition program.

## 2023-03-22 ENCOUNTER — TELEMEDICINE (OUTPATIENT)
Dept: PSYCHIATRY | Facility: CLINIC | Age: 28
End: 2023-03-22
Payer: COMMERCIAL

## 2023-03-22 DIAGNOSIS — F33.1 MAJOR DEPRESSIVE DISORDER, RECURRENT EPISODE, MODERATE: Primary | Chronic | ICD-10-CM

## 2023-03-22 DIAGNOSIS — F51.5 NIGHTMARES: ICD-10-CM

## 2023-03-22 DIAGNOSIS — F41.9 ANXIETY DISORDER, UNSPECIFIED TYPE: Chronic | ICD-10-CM

## 2023-03-22 DIAGNOSIS — G47.9 SLEEPING DIFFICULTIES: ICD-10-CM

## 2023-03-22 PROCEDURE — 99214 OFFICE O/P EST MOD 30 MIN: CPT | Performed by: NURSE PRACTITIONER

## 2023-03-22 PROCEDURE — 1159F MED LIST DOCD IN RCRD: CPT | Performed by: NURSE PRACTITIONER

## 2023-03-22 PROCEDURE — 1160F RVW MEDS BY RX/DR IN RCRD: CPT | Performed by: NURSE PRACTITIONER

## 2023-03-22 RX ORDER — VORTIOXETINE 20 MG/1
20 TABLET, FILM COATED ORAL
Qty: 30 TABLET | Refills: 0 | Status: SHIPPED | OUTPATIENT
Start: 2023-03-22

## 2023-03-22 RX ORDER — HYDROXYZINE HYDROCHLORIDE 25 MG/1
25 TABLET, FILM COATED ORAL NIGHTLY PRN
Qty: 30 TABLET | Refills: 0 | Status: SHIPPED | OUTPATIENT
Start: 2023-03-22

## 2023-03-22 RX ORDER — LAMOTRIGINE 25 MG/1
75 TABLET ORAL DAILY
Qty: 90 TABLET | Refills: 0 | Status: SHIPPED | OUTPATIENT
Start: 2023-03-22

## 2023-03-22 RX ORDER — PRAZOSIN HYDROCHLORIDE 1 MG/1
1 CAPSULE ORAL NIGHTLY
Qty: 30 CAPSULE | Refills: 0 | Status: SHIPPED | OUTPATIENT
Start: 2023-03-22

## 2023-03-22 NOTE — PROGRESS NOTES
This provider is located at the [facility] (through [facility]), [address], [facility], [address] using a secure [employer] Video Visit through [employer]. Patient is being seen remotely via telehealth at their home address in [place], and stated they are in a secure environment for this session. The patient's condition being diagnosed/treated is appropriate for telemedicine. The provider identified herself as well as her credentials.   The patient, and/or patients guardian, consent to be seen remotely, and when consent is given they understand that the consent allows for patient identifiable information to be sent to a third party as needed.   They may refuse to be seen remotely at any time. The electronic data is encrypted and password protected, and the patient and/or guardian has been advised of the potential risks to privacy not withstanding such measures.    You have chosen to receive care through a telehealth visit.  Do you consent to use a video/audio connection for your medical care today? Yes    Patient identifiers utilized: Name and date of birth.    Patient verbally confirmed consent for today's encounter 3/22/23.    Subjective   Maddy Chisholm is a 28 y.o. female who presents today for follow up    Chief Complaint: Medication management follow-up - Mood/depression, anxiety, sleeping difficulties, and nightmares follow-up    Accompanied by: The patient is alone at today's encounter    History of Present Illness:   The patient describes her mood as doing okay since her last encounter with this APRN.  The patient reports she has been forced into a different position at work, and she has had some increased depressive and anxious symptoms due to this change at work.  The patient reports she basically was given an ultimatum, and it was change her shift or not work there anymore.  The patient reports she did work Saturday and Sunday 12-hour shifts at her  facility and got paid for 36 hours, and will now be working Monday through Friday 3 PM - 11:30 PM.  The patient reports if she does not like this new shift, or she is not able to go back to her weekend shifts, she may look for something else, but has not made her mind up at this time.  The patient reports she has not reestablished with psychotherapy as of yet, but plans to.  The patient rates her symptoms of depression at a 6.5-7/10 on a 0-10 scale, with 10 being the worst.  The patient rates her symptoms of anxiety at a 6.5-7/10 on a 0-10 scale, with 10 being the worst.  The patient reports her appetite as good.  The patient reports her sleep as good.  The patient reports occasional nightmares, but not as frequently with the use of prazosin.  The patient denies any new medical problems or changes in medications since last appointment with this facility.  The patient reports compliance with her current medication regimen.  The patient reports if she misses a day or 2 of Trintellix, when she restarts the Trintellix it does cause nausea, but denies any at this time and reports current compliance with her medication regimen.  The patient denies any side effects or concerns from her current medication regimen.  The patient denies any auditory hallucinations or visual hallucinations.  The patient does not endorse any significant symptoms consistent with oskar or psychosis at today's encounter.  The patient denies any suicidal or homicidal ideations, plans, or intent at today's encounter and is convincing.  The patient reports she would like to not adjust or change her current psychotropic medication regimen at today's encounter.      All Known Prior Psychiatric Medications:  -Lexapro - decreased her libido so she stopped taking it (can't remember if it helped her mood or not)  -Prozac  -Wellbutrin  -Hydroxyzine  -Lamictal  -Prazosin  -Trintellix      Last Menstrual Period:  One week ago.  The patient was educated that  her prescribed medications can have potential risk to a developing fetus. The patient is advised to contact this APRN/this office if she becomes pregnant or plans to become pregnant.  Pt verbalizes understanding and acknowledged agreement with this plan in her own words.        The following portions of the patient's history were reviewed and updated as appropriate: allergies, current medications, past family history, past medical history, past social history, past surgical history and problem list.            Past Medical History:  Past Medical History:   Diagnosis Date   • Anemia    • Anxiety    • COVID 2022   • Depression    • Vitamin B12 deficiency        Social History:  Social History     Socioeconomic History   • Marital status: Single   • Number of children: 0   Tobacco Use   • Smoking status: Former     Packs/day: 0.20     Years: 0.25     Pack years: 0.05     Types: Cigarettes     Start date: 2020     Quit date: 8/15/2020     Years since quittin.6   • Smokeless tobacco: Never   • Tobacco comments:     smoked for 5 years  (black and milds for 5 years then restarted 2020 and quit 2020   Vaping Use   • Vaping Use: Never used   Substance and Sexual Activity   • Alcohol use: Yes     Comment: rarely   • Drug use: Not Currently     Comment: The patient states in her past she has used marijuanna, but not currently   • Sexual activity: Yes     Partners: Male     Birth control/protection: OCP       Family History:  Family History   Adopted: Yes       Past Surgical History:  Past Surgical History:   Procedure Laterality Date   • NO PAST SURGERIES         Problem List:  Patient Active Problem List   Diagnosis   • Iron deficiency anemia secondary to inadequate dietary iron intake   • B12 deficiency   • Anxiety   • BV (bacterial vaginosis)   • Allergic rhinitis   • Vitiligo   • Generalized anxiety disorder   • Moderate episode of recurrent major depressive disorder (HCC)   • Restless leg   • Excessive  sweating       Allergy:   No Known Allergies     Current Medications:   Current Outpatient Medications   Medication Sig Dispense Refill   • hydrOXYzine (ATARAX) 25 MG tablet Take 1 tablet by mouth At Night As Needed (anxiety and/or sleep). 30 tablet 0   • lamoTRIgine (LaMICtal) 25 MG tablet Take 3 tablets by mouth Daily. 90 tablet 0   • prazosin (MINIPRESS) 1 MG capsule Take 1 capsule by mouth Every Night. 30 capsule 0   • Vortioxetine HBr (Trintellix) 20 MG tablet Take 1 tablet by mouth Daily With Breakfast. 30 tablet 0   • aluminum chloride (DRYSOL) 20 % external solution Apply  topically to the appropriate area as directed Every Night. 60 mL 1   • drospirenone-ethinyl estradiol (TICO,OCELLA) 3-0.03 MG per tablet Take 1 tablet by mouth Daily. 30 tablet 5   • fluticasone (FLONASE) 50 MCG/ACT nasal spray 2 sprays by Each Nare route Daily. 16 g 3     No current facility-administered medications for this visit.       Review of Symptoms:    Review of Systems   Psychiatric/Behavioral: Positive for sleep disturbance (improved with medication), depressed mood and stress. Negative for agitation, behavioral problems, decreased concentration, dysphoric mood, hallucinations, self-injury, suicidal ideas and negative for hyperactivity. The patient is nervous/anxious.          Physical Exam:   not currently breastfeeding. There is no height or weight on file to calculate BMI.   Due to the remote nature of this encounter (virtual encounter), vitals were unable to be obtained.  Height stated at 66.5 inches.  Weight stated around 170-176 pounds.      Physical Exam  Neurological:      Mental Status: She is alert and oriented to person, place, and time.   Psychiatric:         Attention and Perception: Attention normal.         Mood and Affect: Affect normal. Mood is anxious and depressed.         Speech: Speech normal.         Behavior: Behavior normal. Behavior is cooperative.         Thought Content: Thought content normal.  Thought content is not paranoid or delusional. Thought content does not include homicidal or suicidal ideation. Thought content does not include homicidal or suicidal plan.         Cognition and Memory: Cognition and memory normal.         Judgment: Judgment normal.         Mental Status Exam:   Hygiene:   good  Cooperation:  Cooperative  Eye Contact:  Good  Psychomotor Behavior:  Appropriate  Affect:  Appropriate  Mood: depressed and anxious  Hopelessness: Denies  Speech:  Normal  Thought Process:  Linear  Thought Content:  Mood congruent  Suicidal:  None  Homicidal:  None  Hallucinations:  None  Delusion:  None  Memory:  Intact  Orientation:  Person, Place, Time and Situation  Reliability:  good  Insight:  Good  Judgement:  Good  Impulse Control:  Good  Physical/Medical Issues:  No          Lab Results:   No visits with results within 1 Month(s) from this visit.   Latest known visit with results is:   Lab on 08/01/2022   Component Date Value Ref Range Status   • WBC 08/01/2022 7.23  3.40 - 10.80 10*3/mm3 Final   • RBC 08/01/2022 5.05  3.77 - 5.28 10*6/mm3 Final   • Hemoglobin 08/01/2022 12.2  12.0 - 15.9 g/dL Final   • Hematocrit 08/01/2022 37.6  34.0 - 46.6 % Final   • MCV 08/01/2022 74.5 (L)  79.0 - 97.0 fL Final   • MCH 08/01/2022 24.2 (L)  26.6 - 33.0 pg Final   • MCHC 08/01/2022 32.4  31.5 - 35.7 g/dL Final   • RDW 08/01/2022 14.4  12.3 - 15.4 % Final   • RDW-SD 08/01/2022 38.4  37.0 - 54.0 fl Final   • MPV 08/01/2022 10.6  6.0 - 12.0 fL Final   • Platelets 08/01/2022 256  140 - 450 10*3/mm3 Final   • Ferritin 08/01/2022 28.70  13.00 - 150.00 ng/mL Final   • Vitamin B-12 08/01/2022 686  211 - 946 pg/mL Final         Assessment & Plan   Problems Addressed this Visit    None  Visit Diagnoses     Major depressive disorder, recurrent episode, moderate (HCC)  (Chronic)   -  Primary    R/O BPD R/O Mood d/o    Relevant Medications    Vortioxetine HBr (Trintellix) 20 MG tablet    lamoTRIgine (LaMICtal) 25 MG tablet     hydrOXYzine (ATARAX) 25 MG tablet    Anxiety disorder, unspecified type  (Chronic)       Relevant Medications    Vortioxetine HBr (Trintellix) 20 MG tablet    hydrOXYzine (ATARAX) 25 MG tablet    Sleeping difficulties        Relevant Medications    hydrOXYzine (ATARAX) 25 MG tablet    Nightmares        Relevant Medications    Vortioxetine HBr (Trintellix) 20 MG tablet    hydrOXYzine (ATARAX) 25 MG tablet    prazosin (MINIPRESS) 1 MG capsule      Diagnoses       Codes Comments    Major depressive disorder, recurrent episode, moderate (HCC)    -  Primary ICD-10-CM: F33.1  ICD-9-CM: 296.32 R/O BPD R/O Mood d/o    Anxiety disorder, unspecified type     ICD-10-CM: F41.9  ICD-9-CM: 300.00     Sleeping difficulties     ICD-10-CM: G47.9  ICD-9-CM: 780.50     Nightmares     ICD-10-CM: F51.5  ICD-9-CM: 307.47           Visit Diagnoses:    ICD-10-CM ICD-9-CM   1. Major depressive disorder, recurrent episode, moderate (HCC)  F33.1 296.32   2. Anxiety disorder, unspecified type  F41.9 300.00   3. Sleeping difficulties  G47.9 780.50   4. Nightmares  F51.5 307.47          GOALS:  Short Term Goals: Patient will be compliant with medication, and patient will have no significant medication related side effects.  Patient will be engaged in psychotherapy as indicated.  Patient will report subjective improvement of symptoms.  Long term goals: To stabilize mood and treat/improve subjective symptoms, the patient will stay out of the hospital, the patient will be at an optimal level of functioning, and the patient will take all medications as prescribed.  The patient verbalized understanding and agreement with goals that were mutually set.      TREATMENT PLAN: Continue supportive psychotherapy efforts and take medications as indicated.  Medication and treatment options, both pharmacological and non-pharmacological treatment options, discussed during today's visit, including any off label use of medication. Patient acknowledged and  verbally consented with current treatment plan and was educated on the importance of compliance with treatment and follow-up appointments.      -Continue hydroxyzine 25 mg by mouth once daily at bedtime as needed for sleep and/or anxiety.  -Continue lamotrigine 75 mg by mouth once daily for mood.  -Continue Prazosin 1 mg by mouth once nightly at bedtime for nightmares.  -Continue Trintellix 20 mg by mouth once daily in the morning with breakfast for moods.      MEDICATION ISSUES:  Discussed medication options and treatment plan of prescribed medication, any off label use of medication, as well as the risks, benefits, any black box warnings including increased suicidality, and side effects including but not limited to potential falls, dizziness, possible impaired driving, GI side effects (change in appetite, abdominal discomfort, nausea, vomiting, diarrhea, and/or constipation), dry mouth, somnolence, sedation, insomnia, activation, agitation, irritation, tremors, abnormal muscle movements or disorders, headache, sweating, possible bruising or rare bleeding, electrolyte and/or fluid abnormalities, change in blood pressure/heart rate/and or heart rhythm, sexual dysfunction, and metabolic adversities among others. Patient and/or guardian agreeable to call the office with any worsening of symptoms or onset of side effects, or if any concerns or questions arise.  The contact information for the office is made available to the patient and/or guardian.  Patient and/or guardian agreeable to call 911 or go to the nearest ER should they begin having any SI/HI, or if any urgent concerns arise. No medication side effects or related complaints today.    This APRN has discussed the benefits and risks of taking/continuing Lamictal (Lamotrigine).  The side effects of Lamictal can include a benign rash, blurred or double vision, dizziness, ataxia, sedation, headache, tremor, insomnia, poor coordination, fatigue,  nausea, vomiting,  dyspepsia, rhinitis, infection, pharyngitis, asthenia, a rare but serious rash, rare multi-organ failure associated with Santana-Jean Syndrome, toxic epidermal necrolysis, drug hypersensitivity syndrome, rare blood dyscrasias, rare aseptic meningitis, rare sudden unexplained deaths in people with epilepsy, withdrawal seizures upon abrupt withdrawal, and rare activation of suicidal ideation and behavior (suicidality).  This APRN has discussed that a very slow dose titration when starting, or changing doses, of Lamictal may reduce the incidence of skin rash and other side effects.  The dosage should not be titrated upwards or increased faster than recommended due to the possibility of the discussed side effects and risk of development of a skin rash (which can become life threatening).    This APRN has also discussed that if the patient stops taking the Lamictal for 3-5 days or longer, it will be necessary to restart the drug with an initial dose titration, as rashes have been reported on reexposure.  If the patient and Provider decide to stop the Lamictal, the patient will follow the directions of this APRN/this office as a guided taper over about two weeks is appropriate due to the risk of relapse in bipolar disorder with those with a mood or bipolar disorder, the risk of seizures in those with epilepsy, and discontinuation symptoms upon rapid discontinuation of Lamictal.    The patient verbalizes understanding of benefits and risks as discussed, the patient/guardian feels the benefits outweigh the risks and is agreeable to continue/take Lamictal as discussed.  The patient is advised should any side effects or rash develops they are to stop the Lamictal immediately and contact this APRN/this office or go to the emergency department immediately.  The patient verbalizes understanding and agreement with treatment plan in their own words.      VERBAL INFORMED CONSENT FOR MEDICATION:  The patient was educated that  their proposed/prescribed psychotropic medication(s) has potential risks, side effects, adverse effects, and black box warnings; and these have been discussed with the patient.  The patient has been informed that their treatment and medication dosage is to be individualized, and may even be above or below the recommended range/dosage due to patient individualization and response, but medication is prescribed using a shared decision making approach, and no medication or dosage will be prescribed without the patient's verbal consent.  The reason for the use of the medication including any off label use and alternative modes of treatment other than or in addition to medication has been considered and discussed, the probable consequences of not receiving the proposed treatment have been discussed, and any treatment side effects, black box warnings, and cautions associated with treatment have been discussed with the patient.  The patient is allowed ample time to openly discuss and ask questions regarding the proposed medication(s) and treatment plan and the patient verbalizes understanding the reasons for the use of the medication, its potential risks and benefits, other alternative treatment(s), and the probable consequences that may occur if the proposed medication is not given.  The patient has been given ample time to ask questions and study the information and find the information to be specific, accurate, and complete.  The patient gives verbal consent for the medication(s) proposed/prescribed, they verbalized understanding that they can refuse and withdraw consent at any time with the assistance of this APRN, and the patient has verbally confirmed that they are aware, and are willing, to take the prescribed medication and follow the treatment plan with the known possible risks, side effect, black box warnings, and any potential medication interactions, and the patient reports they will be worse off without this  medication and treatment plan.  The patient is advised to contact this APRN/this office if any questions or concerns arise at any time (at [phone]), or call 911/go to the closest emergency department if needed or outside of office hours.      SUICIDE RISK ASSESSMENT AND SAFETY PLAN: Unalterable demographics and a history of mental health intervention indicate this patient is in a high risk category compared to the general population. At present, the patient denies active SI/HI, intentions, or plans at this time and agrees to seek immediate care should such thoughts develop. The patient verbalizes understanding of how to access emergency care if needed and agrees to do so. Consideration of suicide risk and protective factors such as history, current presentation, individual strengths and weaknesses, psychosocial and environmental stressors and variables, psychiatric illness and symptoms, medical conditions and pain, took place in this interview. Based on those considerations, the patient is determined: within individual baseline and presenting no imminent risk for suicide or homicide. Other recommendations: The patient does not meet the criteria for inpatient admission and is not a safety risk to self or others at today's visit. Inpatient treatment offers no significant advantages over outpatient treatment for this patient at today's visit.  The patient was given ample time for questions and fully participated in treatment planning.  The patient was encouraged to call the clinic with any questions or concerns.  The patient was informed of access to emergency care. If patient were to develop any significant symptomatology, suicidal ideation, homicidal ideation, any concerns, or feel unsafe at any time they are to call the clinic and if unable to get immediate assistance should immediately call 911 or go to the nearest emergency room.  Patient contracted verbally for the following: If you are experiencing an  emotional crisis or have thoughts of harming yourself or others, please go to your nearest local emergency room or call 911. Will continue to re-assess medication response and side effects frequently to establish efficacy and ensure safety. Risks, any black box warnings, side effects, off label usage, and benefits of medication and treatment discussed with patient, along with potential adverse side effects of current and/or newly prescribed medication, alternative treatment options, and OTC medications.  Patient verbalized understanding of potential risks, any off label use of medication, any black box warnings, and any side effects in their own words. The patient verbalized understanding and agreed to comply with the safety plan discussed in their own words.  Patient given the number to the office. Number also discussed of the 24- hour suicide hotline.           MEDS ORDERED DURING VISIT:  New Medications Ordered This Visit   Medications   • Vortioxetine HBr (Trintellix) 20 MG tablet     Sig: Take 1 tablet by mouth Daily With Breakfast.     Dispense:  30 tablet     Refill:  0   • lamoTRIgine (LaMICtal) 25 MG tablet     Sig: Take 3 tablets by mouth Daily.     Dispense:  90 tablet     Refill:  0   • hydrOXYzine (ATARAX) 25 MG tablet     Sig: Take 1 tablet by mouth At Night As Needed (anxiety and/or sleep).     Dispense:  30 tablet     Refill:  0   • prazosin (MINIPRESS) 1 MG capsule     Sig: Take 1 capsule by mouth Every Night.     Dispense:  30 capsule     Refill:  0       Return in about 4 weeks (around 4/19/2023), or if symptoms worsen or fail to improve, for Next scheduled follow up and Recheck.       Progress: Not at goal    Functional Status: Moderate impairment     Prognosis: Good with Ongoing Treatment      Psychotherapy treatment plan: Completed by patient's therapist            This document has been electronically signed by ARVIN Tamez  March 22, 2023 11:12 EDT    Some of the data in this  electronic note has been brought forward from a previous encounter, any necessary changes have been made, it has been reviewed by this APRN, and it is accurate.    Please note that portions of this note were completed with a voice recognition program.

## 2023-04-14 DIAGNOSIS — Z30.41 ENCOUNTER FOR SURVEILLANCE OF CONTRACEPTIVE PILLS: ICD-10-CM

## 2023-04-14 RX ORDER — DROSPIRENONE AND ETHINYL ESTRADIOL 0.03MG-3MG
KIT ORAL
Qty: 30 TABLET | Refills: 0 | Status: SHIPPED | OUTPATIENT
Start: 2023-04-14

## 2023-04-14 RX ORDER — DROSPIRENONE AND ETHINYL ESTRADIOL 0.03MG-3MG
1 KIT ORAL DAILY
Qty: 30 TABLET | Refills: 5 | OUTPATIENT
Start: 2023-04-14

## 2023-04-20 ENCOUNTER — TELEMEDICINE (OUTPATIENT)
Dept: PSYCHIATRY | Facility: CLINIC | Age: 28
End: 2023-04-20
Payer: COMMERCIAL

## 2023-04-20 DIAGNOSIS — F51.5 NIGHTMARES: ICD-10-CM

## 2023-04-20 DIAGNOSIS — F33.1 MAJOR DEPRESSIVE DISORDER, RECURRENT EPISODE, MODERATE: Primary | Chronic | ICD-10-CM

## 2023-04-20 DIAGNOSIS — F41.9 ANXIETY DISORDER, UNSPECIFIED TYPE: Chronic | ICD-10-CM

## 2023-04-20 RX ORDER — VORTIOXETINE 20 MG/1
20 TABLET, FILM COATED ORAL
Qty: 30 TABLET | Refills: 0 | Status: SHIPPED | OUTPATIENT
Start: 2023-04-20

## 2023-04-20 RX ORDER — PRAZOSIN HYDROCHLORIDE 1 MG/1
1 CAPSULE ORAL NIGHTLY
Qty: 30 CAPSULE | Refills: 0 | Status: SHIPPED | OUTPATIENT
Start: 2023-04-20

## 2023-04-20 RX ORDER — LAMOTRIGINE 25 MG/1
75 TABLET ORAL DAILY
Qty: 90 TABLET | Refills: 0 | Status: SHIPPED | OUTPATIENT
Start: 2023-04-20

## 2023-04-20 NOTE — PROGRESS NOTES
"This provider is located at the [facility] (through [facility]), [address], [place], [address] using a secure Mobile [employer] Video Visit through [employer]. Patient is being seen remotely via telehealth at their home address in [place], and stated they are in a secure environment for this session. The patient's condition being diagnosed/treated is appropriate for telemedicine. The provider identified herself as well as her credentials.   The patient, and/or patients guardian, consent to be seen remotely, and when consent is given they understand that the consent allows for patient identifiable information to be sent to a third party as needed.   They may refuse to be seen remotely at any time. The electronic data is encrypted and password protected, and the patient and/or guardian has been advised of the potential risks to privacy not withstanding such measures.    You have chosen to receive care through a telehealth visit.  Do you consent to use a video/audio connection for your medical care today? Yes    Patient identifiers utilized: Name and date of birth.    Patient verbally confirmed consent for today's encounter 4/20/23.    Subjective   Maddy Chisholm is a 28 y.o. female who presents today for follow up    Chief Complaint: Medication management follow-up - Mood/depression, anxiety, sleeping difficulties, and nightmares follow-up    Accompanied by: The patient is alone at today's encounter    History of Present Illness:   The patient describes her mood as \"neutral I guess\" since her last encounter with this APRN.  The patient reports she feels her days have been \"pretty okay\" overall, and more good than bad overall.  The patient rates her symptoms of depression at a 5.5/10 on a 0-10 scale, with 10 being the worst.  The patient rates her symptoms of anxiety at a 5.5/10 on a 0-10 scale, with 10 being the worst.  The patient reports her appetite as good.  The " patient reports her sleep as good.  The patient reports she has not had as many bad dreams recently.  The patient denies any new medical problems or changes in medications since last appointment with this facility.  The patient reports compliance with her current medication regimen.  The patient reports she did miss lamotrigine for 3 days when she forgot to  her prescription, but overall is compliant with her medication regimen.  The patient denies any side effects, rashes, or concerns from her current medication regimen.  The patient denies any auditory hallucinations or visual hallucinations.  The patient does not endorse any significant symptoms consistent with oskar or psychosis at today's encounter.  The patient denies any suicidal or homicidal ideations, plans, or intent at today's encounter and is convincing.  The patient reports she would like to not adjust or change her current psychotropic medication regimen at today's encounter.  The patient reports she has not met with her therapist recently due to her new work schedule and conflicts with time, but does plan to reach out to see if her therapist can adjust their typical timing of their morning therapy appointments.  The patient requests hydroxyzine be discontinued, no longer needed.        All Known Prior Psychiatric Medications:  -Lexapro - decreased her libido so she stopped taking it (can't remember if it helped her mood or not)  -Prozac  -Wellbutrin  -Hydroxyzine  -Lamictal  -Prazosin  -Trintellix      Last Menstrual Period:  4/16/23.  The patient was educated that her prescribed medications can have potential risk to a developing fetus. The patient is advised to contact this APRN/this office if she becomes pregnant or plans to become pregnant.  Pt verbalizes understanding and acknowledged agreement with this plan in her own words.        The following portions of the patient's history were reviewed and updated as appropriate: allergies,  current medications, past family history, past medical history, past social history, past surgical history and problem list.            Past Medical History:  Past Medical History:   Diagnosis Date   • Anemia    • Anxiety    • COVID 2022   • Depression    • Vitamin B12 deficiency        Social History:  Social History     Socioeconomic History   • Marital status: Single   • Number of children: 0   Tobacco Use   • Smoking status: Former     Packs/day: 0.20     Years: 0.25     Pack years: 0.05     Types: Cigarettes     Start date: 2020     Quit date: 8/15/2020     Years since quittin.6   • Smokeless tobacco: Never   • Tobacco comments:     smoked for 5 years  (black and milds for 5 years then restarted 2020 and quit 2020   Vaping Use   • Vaping Use: Never used   Substance and Sexual Activity   • Alcohol use: Yes     Comment: rarely   • Drug use: Not Currently     Comment: The patient states in her past she has used marijuanna, but not currently   • Sexual activity: Yes     Partners: Male     Birth control/protection: OCP       Family History:  Family History   Adopted: Yes       Past Surgical History:  Past Surgical History:   Procedure Laterality Date   • NO PAST SURGERIES         Problem List:  Patient Active Problem List   Diagnosis   • Iron deficiency anemia secondary to inadequate dietary iron intake   • B12 deficiency   • Anxiety   • BV (bacterial vaginosis)   • Allergic rhinitis   • Vitiligo   • Generalized anxiety disorder   • Moderate episode of recurrent major depressive disorder   • Restless leg   • Excessive sweating       Allergy:   No Known Allergies     Current Medications:   Current Outpatient Medications   Medication Sig Dispense Refill   • lamoTRIgine (LaMICtal) 25 MG tablet Take 3 tablets by mouth Daily. 90 tablet 0   • prazosin (MINIPRESS) 1 MG capsule Take 1 capsule by mouth Every Night. 30 capsule 0   • Vortioxetine HBr (Trintellix) 20 MG tablet Take 1 tablet by mouth Daily With  Breakfast. 30 tablet 0   • aluminum chloride (DRYSOL) 20 % external solution Apply  topically to the appropriate area as directed Every Night. 60 mL 1   • fluticasone (FLONASE) 50 MCG/ACT nasal spray 2 sprays by Each Nare route Daily. 16 g 3   • Machelle 3-0.03 MG per tablet TAKE ONE TABLET BY MOUTH DAILY 30 tablet 0     No current facility-administered medications for this visit.       Review of Symptoms:    Review of Systems   Psychiatric/Behavioral: Positive for sleep disturbance (improved with medication), depressed mood and stress. Negative for agitation, behavioral problems, decreased concentration, dysphoric mood, hallucinations, self-injury, suicidal ideas and negative for hyperactivity. The patient is nervous/anxious.          Physical Exam:   not currently breastfeeding. There is no height or weight on file to calculate BMI.   Due to the remote nature of this encounter (virtual encounter), vitals were unable to be obtained.  Height stated at 66.5 inches.  Weight stated around 170-176 pounds.      Physical Exam  Neurological:      Mental Status: She is alert and oriented to person, place, and time.   Psychiatric:         Attention and Perception: Attention normal.         Mood and Affect: Affect normal. Mood is anxious and depressed.         Speech: Speech normal.         Behavior: Behavior normal. Behavior is cooperative.         Thought Content: Thought content normal. Thought content is not paranoid or delusional. Thought content does not include homicidal or suicidal ideation. Thought content does not include homicidal or suicidal plan.         Cognition and Memory: Cognition and memory normal.         Judgment: Judgment normal.         Mental Status Exam:   Hygiene:   good  Cooperation:  Cooperative  Eye Contact:  Good  Psychomotor Behavior:  Appropriate  Affect:  Appropriate  Mood: depressed and anxious  Hopelessness: Denies  Speech:  Normal  Thought Process:  Linear  Thought Content:  Mood  congruent  Suicidal:  None  Homicidal:  None  Hallucinations:  None  Delusion:  None  Memory:  Intact  Orientation:  Person, Place, Time and Situation  Reliability:  good  Insight:  Good  Judgement:  Good  Impulse Control:  Good  Physical/Medical Issues:  No            PDMP reviewed by this APRN at today's encounter.          Lab Results:   No visits with results within 1 Month(s) from this visit.   Latest known visit with results is:   Lab on 08/01/2022   Component Date Value Ref Range Status   • WBC 08/01/2022 7.23  3.40 - 10.80 10*3/mm3 Final   • RBC 08/01/2022 5.05  3.77 - 5.28 10*6/mm3 Final   • Hemoglobin 08/01/2022 12.2  12.0 - 15.9 g/dL Final   • Hematocrit 08/01/2022 37.6  34.0 - 46.6 % Final   • MCV 08/01/2022 74.5 (L)  79.0 - 97.0 fL Final   • MCH 08/01/2022 24.2 (L)  26.6 - 33.0 pg Final   • MCHC 08/01/2022 32.4  31.5 - 35.7 g/dL Final   • RDW 08/01/2022 14.4  12.3 - 15.4 % Final   • RDW-SD 08/01/2022 38.4  37.0 - 54.0 fl Final   • MPV 08/01/2022 10.6  6.0 - 12.0 fL Final   • Platelets 08/01/2022 256  140 - 450 10*3/mm3 Final   • Ferritin 08/01/2022 28.70  13.00 - 150.00 ng/mL Final   • Vitamin B-12 08/01/2022 686  211 - 946 pg/mL Final         Assessment & Plan   Problems Addressed this Visit    None  Visit Diagnoses     Major depressive disorder, recurrent episode, moderate  (Chronic)   -  Primary    R/O BPD R/O Mood d/o    Relevant Medications    Vortioxetine HBr (Trintellix) 20 MG tablet    lamoTRIgine (LaMICtal) 25 MG tablet    Anxiety disorder, unspecified type  (Chronic)       Relevant Medications    Vortioxetine HBr (Trintellix) 20 MG tablet    Nightmares        Relevant Medications    Vortioxetine HBr (Trintellix) 20 MG tablet    prazosin (MINIPRESS) 1 MG capsule      Diagnoses       Codes Comments    Major depressive disorder, recurrent episode, moderate    -  Primary ICD-10-CM: F33.1  ICD-9-CM: 296.32 R/O BPD R/O Mood d/o    Anxiety disorder, unspecified type     ICD-10-CM: F41.9  ICD-9-CM:  300.00     Nightmares     ICD-10-CM: F51.5  ICD-9-CM: 307.47           Visit Diagnoses:    ICD-10-CM ICD-9-CM   1. Major depressive disorder, recurrent episode, moderate  F33.1 296.32   2. Anxiety disorder, unspecified type  F41.9 300.00   3. Nightmares  F51.5 307.47          GOALS:  Short Term Goals: Patient will be compliant with medication, and patient will have no significant medication related side effects.  Patient will be engaged in psychotherapy as indicated.  Patient will report subjective improvement of symptoms.  Long term goals: To stabilize mood and treat/improve subjective symptoms, the patient will stay out of the hospital, the patient will be at an optimal level of functioning, and the patient will take all medications as prescribed.  The patient verbalized understanding and agreement with goals that were mutually set.      TREATMENT PLAN: Continue supportive psychotherapy efforts and take medications as indicated.  Medication and treatment options, both pharmacological and non-pharmacological treatment options, discussed during today's visit, including any off label use of medication. Patient acknowledged and verbally consented with current treatment plan and was educated on the importance of compliance with treatment and follow-up appointments.      -Discontinue hydroxyzine 25 mg by mouth once daily at bedtime as needed for sleep and/or anxiety per patient request.  -Continue lamotrigine 75 mg by mouth once daily for mood.  -Continue Prazosin 1 mg by mouth once nightly at bedtime for nightmares.  -Continue Trintellix 20 mg by mouth once daily in the morning with breakfast for moods.      MEDICATION ISSUES:  Discussed medication options and treatment plan of prescribed medication, any off label use of medication, as well as the risks, benefits, any black box warnings including increased suicidality, and side effects including but not limited to potential falls, dizziness, possible impaired driving,  GI side effects (change in appetite, abdominal discomfort, nausea, vomiting, diarrhea, and/or constipation), dry mouth, somnolence, sedation, insomnia, activation, agitation, irritation, tremors, abnormal muscle movements or disorders, headache, sweating, possible bruising or rare bleeding, electrolyte and/or fluid abnormalities, change in blood pressure/heart rate/and or heart rhythm, sexual dysfunction, and metabolic adversities among others. Patient and/or guardian agreeable to call the office with any worsening of symptoms or onset of side effects, or if any concerns or questions arise.  The contact information for the office is made available to the patient and/or guardian.  Patient and/or guardian agreeable to call 911 or go to the nearest ER should they begin having any SI/HI, or if any urgent concerns arise. No medication side effects or related complaints today.    This APRN has discussed the benefits and risks of taking/continuing Lamictal (Lamotrigine).  The side effects of Lamictal can include a benign rash, blurred or double vision, dizziness, ataxia, sedation, headache, tremor, insomnia, poor coordination, fatigue,  nausea, vomiting, dyspepsia, rhinitis, infection, pharyngitis, asthenia, a rare but serious rash, rare multi-organ failure associated with Santana-Jean Syndrome, toxic epidermal necrolysis, drug hypersensitivity syndrome, rare blood dyscrasias, rare aseptic meningitis, rare sudden unexplained deaths in people with epilepsy, withdrawal seizures upon abrupt withdrawal, and rare activation of suicidal ideation and behavior (suicidality).  This APRN has discussed that a very slow dose titration when starting, or changing doses, of Lamictal may reduce the incidence of skin rash and other side effects.  The dosage should not be titrated upwards or increased faster than recommended due to the possibility of the discussed side effects and risk of development of a skin rash (which can become life  threatening).    This APRN has also discussed that if the patient stops taking the Lamictal for 3-5 days or longer, it will be necessary to restart the drug with an initial dose titration, as rashes have been reported on reexposure.  If the patient and Provider decide to stop the Lamictal, the patient will follow the directions of this APRN/this office as a guided taper over about two weeks is appropriate due to the risk of relapse in bipolar disorder with those with a mood or bipolar disorder, the risk of seizures in those with epilepsy, and discontinuation symptoms upon rapid discontinuation of Lamictal.    The patient verbalizes understanding of benefits and risks as discussed, the patient/guardian feels the benefits outweigh the risks and is agreeable to continue/take Lamictal as discussed.  The patient is advised should any side effects or rash develops they are to stop the Lamictal immediately and contact this APRN/this office or go to the emergency department immediately.  The patient verbalizes understanding and agreement with treatment plan in their own words.      VERBAL INFORMED CONSENT FOR MEDICATION:  The patient was educated that their proposed/prescribed psychotropic medication(s) has potential risks, side effects, adverse effects, and black box warnings; and these have been discussed with the patient.  The patient has been informed that their treatment and medication dosage is to be individualized, and may even be above or below the recommended range/dosage due to patient individualization and response, but medication is prescribed using a shared decision making approach, and no medication or dosage will be prescribed without the patient's verbal consent.  The reason for the use of the medication including any off label use and alternative modes of treatment other than or in addition to medication has been considered and discussed, the probable consequences of not receiving the proposed treatment  have been discussed, and any treatment side effects, black box warnings, and cautions associated with treatment have been discussed with the patient.  The patient is allowed ample time to openly discuss and ask questions regarding the proposed medication(s) and treatment plan and the patient verbalizes understanding the reasons for the use of the medication, its potential risks and benefits, other alternative treatment(s), and the probable consequences that may occur if the proposed medication is not given.  The patient has been given ample time to ask questions and study the information and find the information to be specific, accurate, and complete.  The patient gives verbal consent for the medication(s) proposed/prescribed, they verbalized understanding that they can refuse and withdraw consent at any time with the assistance of this APRN, and the patient has verbally confirmed that they are aware, and are willing, to take the prescribed medication and follow the treatment plan with the known possible risks, side effect, black box warnings, and any potential medication interactions, and the patient reports they will be worse off without this medication and treatment plan.  The patient is advised to contact this APRN/this office if any questions or concerns arise at any time (at [phone]), or call 911/go to the closest emergency department if needed or outside of office hours.      SUICIDE RISK ASSESSMENT AND SAFETY PLAN: Unalterable demographics and a history of mental health intervention indicate this patient is in a high risk category compared to the general population. At present, the patient denies active SI/HI, intentions, or plans at this time and agrees to seek immediate care should such thoughts develop. The patient verbalizes understanding of how to access emergency care if needed and agrees to do so. Consideration of suicide risk and protective factors such as history, current presentation,  individual strengths and weaknesses, psychosocial and environmental stressors and variables, psychiatric illness and symptoms, medical conditions and pain, took place in this interview. Based on those considerations, the patient is determined: within individual baseline and presenting no imminent risk for suicide or homicide. Other recommendations: The patient does not meet the criteria for inpatient admission and is not a safety risk to self or others at today's visit. Inpatient treatment offers no significant advantages over outpatient treatment for this patient at today's visit.  The patient was given ample time for questions and fully participated in treatment planning.  The patient was encouraged to call the clinic with any questions or concerns.  The patient was informed of access to emergency care. If patient were to develop any significant symptomatology, suicidal ideation, homicidal ideation, any concerns, or feel unsafe at any time they are to call the clinic and if unable to get immediate assistance should immediately call 911 or go to the nearest emergency room.  Patient contracted verbally for the following: If you are experiencing an emotional crisis or have thoughts of harming yourself or others, please go to your nearest local emergency room or call 911. Will continue to re-assess medication response and side effects frequently to establish efficacy and ensure safety. Risks, any black box warnings, side effects, off label usage, and benefits of medication and treatment discussed with patient, along with potential adverse side effects of current and/or newly prescribed medication, alternative treatment options, and OTC medications.  Patient verbalized understanding of potential risks, any off label use of medication, any black box warnings, and any side effects in their own words. The patient verbalized understanding and agreed to comply with the safety plan discussed in their own words.  Patient  given the number to the office. Number also discussed of the 24- hour suicide hotline.           MEDS ORDERED DURING VISIT:  New Medications Ordered This Visit   Medications   • Vortioxetine HBr (Trintellix) 20 MG tablet     Sig: Take 1 tablet by mouth Daily With Breakfast.     Dispense:  30 tablet     Refill:  0   • prazosin (MINIPRESS) 1 MG capsule     Sig: Take 1 capsule by mouth Every Night.     Dispense:  30 capsule     Refill:  0   • lamoTRIgine (LaMICtal) 25 MG tablet     Sig: Take 3 tablets by mouth Daily.     Dispense:  90 tablet     Refill:  0       Return in about 4 weeks (around 5/18/2023), or if symptoms worsen or fail to improve, for Next scheduled follow up and Recheck.       Progress: Not at goal    Functional Status: Mild impairment     Prognosis: Good with Ongoing Treatment      Psychotherapy treatment plan: Completed by patient's therapist            This document has been electronically signed by ARVIN Tamez  April 20, 2023 11:05 EDT    Some of the data in this electronic note has been brought forward from a previous encounter, any necessary changes have been made, it has been reviewed by this APRN, and it is accurate.    Please note that portions of this note were completed with a voice recognition program.

## 2023-05-09 ENCOUNTER — LAB (OUTPATIENT)
Dept: INTERNAL MEDICINE | Facility: CLINIC | Age: 28
End: 2023-05-09
Payer: COMMERCIAL

## 2023-05-09 ENCOUNTER — OFFICE VISIT (OUTPATIENT)
Dept: INTERNAL MEDICINE | Facility: CLINIC | Age: 28
End: 2023-05-09
Payer: COMMERCIAL

## 2023-05-09 VITALS
DIASTOLIC BLOOD PRESSURE: 62 MMHG | HEART RATE: 78 BPM | HEIGHT: 66 IN | OXYGEN SATURATION: 99 % | SYSTOLIC BLOOD PRESSURE: 104 MMHG | BODY MASS INDEX: 29.35 KG/M2 | RESPIRATION RATE: 16 BRPM | TEMPERATURE: 96.8 F | WEIGHT: 182.6 LBS

## 2023-05-09 DIAGNOSIS — Z00.00 ANNUAL PHYSICAL EXAM: ICD-10-CM

## 2023-05-09 DIAGNOSIS — D50.8 IRON DEFICIENCY ANEMIA SECONDARY TO INADEQUATE DIETARY IRON INTAKE: ICD-10-CM

## 2023-05-09 DIAGNOSIS — Z00.00 ANNUAL PHYSICAL EXAM: Primary | ICD-10-CM

## 2023-05-09 DIAGNOSIS — Z30.41 ENCOUNTER FOR SURVEILLANCE OF CONTRACEPTIVE PILLS: ICD-10-CM

## 2023-05-09 DIAGNOSIS — E53.8 B12 DEFICIENCY: ICD-10-CM

## 2023-05-09 DIAGNOSIS — Z23 NEED FOR COVID-19 VACCINE: ICD-10-CM

## 2023-05-09 LAB
ALBUMIN SERPL-MCNC: 4.2 G/DL (ref 3.5–5.2)
ALBUMIN/GLOB SERPL: 1.4 G/DL
ALP SERPL-CCNC: 82 U/L (ref 39–117)
ALT SERPL W P-5'-P-CCNC: 11 U/L (ref 1–33)
ANION GAP SERPL CALCULATED.3IONS-SCNC: 13.8 MMOL/L (ref 5–15)
AST SERPL-CCNC: 17 U/L (ref 1–32)
BILIRUB SERPL-MCNC: <0.2 MG/DL (ref 0–1.2)
BUN SERPL-MCNC: 10 MG/DL (ref 6–20)
BUN/CREAT SERPL: 11.8 (ref 7–25)
CALCIUM SPEC-SCNC: 9.7 MG/DL (ref 8.6–10.5)
CHLORIDE SERPL-SCNC: 99 MMOL/L (ref 98–107)
CHOLEST SERPL-MCNC: 236 MG/DL (ref 0–200)
CO2 SERPL-SCNC: 24.2 MMOL/L (ref 22–29)
CREAT SERPL-MCNC: 0.85 MG/DL (ref 0.57–1)
DEPRECATED RDW RBC AUTO: 38.4 FL (ref 37–54)
EGFRCR SERPLBLD CKD-EPI 2021: 95.8 ML/MIN/1.73
ERYTHROCYTE [DISTWIDTH] IN BLOOD BY AUTOMATED COUNT: 14.6 % (ref 12.3–15.4)
FERRITIN SERPL-MCNC: 35.2 NG/ML (ref 13–150)
GLOBULIN UR ELPH-MCNC: 3 GM/DL
GLUCOSE SERPL-MCNC: 102 MG/DL (ref 65–99)
HCT VFR BLD AUTO: 36.7 % (ref 34–46.6)
HDLC SERPL-MCNC: 101 MG/DL (ref 40–60)
HGB BLD-MCNC: 11.7 G/DL (ref 12–15.9)
IRON 24H UR-MRATE: 41 MCG/DL (ref 37–145)
IRON SATN MFR SERPL: 7 % (ref 20–50)
LDLC SERPL CALC-MCNC: 103 MG/DL (ref 0–100)
LDLC/HDLC SERPL: 0.96 {RATIO}
MCH RBC QN AUTO: 23.4 PG (ref 26.6–33)
MCHC RBC AUTO-ENTMCNC: 31.9 G/DL (ref 31.5–35.7)
MCV RBC AUTO: 73.3 FL (ref 79–97)
PLATELET # BLD AUTO: 244 10*3/MM3 (ref 140–450)
PMV BLD AUTO: 10.2 FL (ref 6–12)
POTASSIUM SERPL-SCNC: 4.2 MMOL/L (ref 3.5–5.2)
PROT SERPL-MCNC: 7.2 G/DL (ref 6–8.5)
RBC # BLD AUTO: 5.01 10*6/MM3 (ref 3.77–5.28)
SODIUM SERPL-SCNC: 137 MMOL/L (ref 136–145)
TIBC SERPL-MCNC: 597 MCG/DL (ref 298–536)
TRANSFERRIN SERPL-MCNC: 401 MG/DL (ref 200–360)
TRIGL SERPL-MCNC: 191 MG/DL (ref 0–150)
VIT B12 BLD-MCNC: 608 PG/ML (ref 211–946)
VLDLC SERPL-MCNC: 32 MG/DL (ref 5–40)
WBC NRBC COR # BLD: 7.08 10*3/MM3 (ref 3.4–10.8)

## 2023-05-09 PROCEDURE — 80061 LIPID PANEL: CPT | Performed by: NURSE PRACTITIONER

## 2023-05-09 PROCEDURE — 36415 COLL VENOUS BLD VENIPUNCTURE: CPT | Performed by: NURSE PRACTITIONER

## 2023-05-09 PROCEDURE — 82607 VITAMIN B-12: CPT | Performed by: NURSE PRACTITIONER

## 2023-05-09 PROCEDURE — 99395 PREV VISIT EST AGE 18-39: CPT | Performed by: NURSE PRACTITIONER

## 2023-05-09 PROCEDURE — 85027 COMPLETE CBC AUTOMATED: CPT | Performed by: NURSE PRACTITIONER

## 2023-05-09 PROCEDURE — 91312 COVID-19 (PFIZER) BIVALENT 12+YRS: CPT | Performed by: NURSE PRACTITIONER

## 2023-05-09 PROCEDURE — 82728 ASSAY OF FERRITIN: CPT | Performed by: NURSE PRACTITIONER

## 2023-05-09 PROCEDURE — 1160F RVW MEDS BY RX/DR IN RCRD: CPT | Performed by: NURSE PRACTITIONER

## 2023-05-09 PROCEDURE — 3008F BODY MASS INDEX DOCD: CPT | Performed by: NURSE PRACTITIONER

## 2023-05-09 PROCEDURE — 80053 COMPREHEN METABOLIC PANEL: CPT | Performed by: NURSE PRACTITIONER

## 2023-05-09 PROCEDURE — 2014F MENTAL STATUS ASSESS: CPT | Performed by: NURSE PRACTITIONER

## 2023-05-09 PROCEDURE — 1159F MED LIST DOCD IN RCRD: CPT | Performed by: NURSE PRACTITIONER

## 2023-05-09 PROCEDURE — 83540 ASSAY OF IRON: CPT | Performed by: NURSE PRACTITIONER

## 2023-05-09 PROCEDURE — 0124A PR ADM SARSCOV2 30MCG/0.3ML BST: CPT | Performed by: NURSE PRACTITIONER

## 2023-05-09 PROCEDURE — 84466 ASSAY OF TRANSFERRIN: CPT | Performed by: NURSE PRACTITIONER

## 2023-05-09 RX ORDER — DROSPIRENONE AND ETHINYL ESTRADIOL 0.03MG-3MG
1 KIT ORAL DAILY
Qty: 84 TABLET | Refills: 3 | Status: SHIPPED | OUTPATIENT
Start: 2023-05-09

## 2023-05-09 NOTE — PROGRESS NOTES
Patient Care Team:  Vero Lopez APRN as PCP - General (Nurse Practitioner)  Cristina Mena APRN as Nurse Practitioner (Psychiatry)     Chief Complaint   Patient presents with   • Annual Exam     W/ pap, pt is fasting               Patient is a 28 y.o. female who presents for her yearly physical exam.     HPI    No acute complaints.  She does have a history of iron deficiency and B12 deficiency.  She would like to check these levels today.  She is not currently on supplements for either.  She is currently on contraception.  She is satisfied with this method and would like a refill of it today.    Health maintenance/lifestyle:  Health Maintenance   Topic Date Due   • ANNUAL PHYSICAL  03/04/2023   • INFLUENZA VACCINE  08/01/2023   • PAP SMEAR  01/18/2024   • TDAP/TD VACCINES (2 - Td or Tdap) 01/18/2031   • HEPATITIS C SCREENING  Completed   • COVID-19 Vaccine  Completed   • Pneumococcal Vaccine 0-64  Aged Out     Immunization History   Administered Date(s) Administered   • COVID-19 (PFIZER) BIVALENT 12+YRS 05/09/2023   • COVID-19 (PFIZER) Purple Cap Monovalent 05/19/2021, 06/09/2021   • FluLaval/Fluzone >6mos 09/18/2020   • Tdap 01/18/2021     Cancer-related family history is not on file. She was adopted.   reports being sexually active and has had partner(s) who are male. She reports using the following method of birth control/protection: OCP.     Her Pap smear is due in 2024.  Flu vaccination due in fall 2023.  Tetanus vaccination due 2031.  COVID vaccination due for update with by Valent booster.  Will update today  Diet healthy overall.  Exercise-stays active.  Works at [employer] and walks around a lot    Review of Systems   Constitutional: Negative for fatigue, fever and unexpected weight loss.   Eyes: Negative for blurred vision, double vision and visual disturbance.   Respiratory: Negative for cough, shortness of breath and wheezing.    Cardiovascular: Negative for chest pain,  palpitations and leg swelling.   Gastrointestinal: Negative for abdominal pain, constipation, diarrhea, nausea and vomiting.   Genitourinary: Negative for difficulty urinating, frequency and urgency.   Musculoskeletal: Negative for arthralgias and myalgias.   Skin: Negative for color change and rash.   Neurological: Negative for dizziness, weakness and headache.   Hematological: Negative for adenopathy. Does not bruise/bleed easily.   All other systems reviewed and are negative.        History  Social History     Socioeconomic History   • Marital status: Single   • Number of children: 0   Tobacco Use   • Smoking status: Former     Packs/day: 0.20     Years: 0.25     Pack years: 0.05     Types: Cigarettes     Start date: 2020     Quit date: 8/15/2020     Years since quittin.7   • Smokeless tobacco: Never   • Tobacco comments:     smoked for 5 years  (black and milds for 5 years then restarted 2020 and quit 2020   Vaping Use   • Vaping Use: Never used   Substance and Sexual Activity   • Alcohol use: Yes     Comment: rarely   • Drug use: Not Currently     Comment: The patient states in her past she has used marijuanna, but not currently   • Sexual activity: Yes     Partners: Male     Birth control/protection: OCP     Past Medical History:   Diagnosis Date   • Anemia    • Anxiety    • COVID 2022   • Depression    • Vitamin B12 deficiency       Past Surgical History:   Procedure Laterality Date   • NO PAST SURGERIES        No Known Allergies   Family History   Adopted: Yes       Current Outpatient Medications:   •  drospirenone-ethinyl estradiol (Machelle) 3-0.03 MG per tablet, Take 1 tablet by mouth Daily., Disp: 84 tablet, Rfl: 3  •  fluticasone (FLONASE) 50 MCG/ACT nasal spray, 2 sprays by Each Nare route Daily., Disp: 16 g, Rfl: 3  •  lamoTRIgine (LaMICtal) 25 MG tablet, Take 3 tablets by mouth Daily., Disp: 90 tablet, Rfl: 0  •  prazosin (MINIPRESS) 1 MG capsule, Take 1 capsule by mouth Every Night.,  "Disp: 30 capsule, Rfl: 0  •  Vortioxetine HBr (Trintellix) 20 MG tablet, Take 1 tablet by mouth Daily With Breakfast., Disp: 30 tablet, Rfl: 0  •  aluminum chloride (DRYSOL) 20 % external solution, Apply  topically to the appropriate area as directed Every Night. (Patient not taking: Reported on 5/9/2023), Disp: 60 mL, Rfl: 1                /62 (BP Location: Right arm, Patient Position: Sitting, Cuff Size: Adult)   Pulse 78   Temp 96.8 °F (36 °C) (Infrared)   Resp 16   Ht 167.6 cm (66\")   Wt 82.8 kg (182 lb 9.6 oz)   LMP 04/18/2023 (Approximate)   SpO2 99%   BMI 29.47 kg/m²       Physical Exam  Vitals and nursing note reviewed.   Constitutional:       General: She is not in acute distress.     Appearance: Normal appearance. She is well-developed. She is not ill-appearing, toxic-appearing or diaphoretic.   HENT:      Head: Normocephalic and atraumatic.      Right Ear: Tympanic membrane and external ear normal.      Left Ear: Tympanic membrane and external ear normal.      Nose: Nose normal.      Mouth/Throat:      Lips: Pink. No lesions.      Mouth: Mucous membranes are moist.      Tongue: No lesions.      Palate: No mass.      Pharynx: Oropharynx is clear.   Eyes:      General: Lids are normal. No scleral icterus.        Right eye: No discharge.         Left eye: No discharge.      Conjunctiva/sclera: Conjunctivae normal.      Pupils: Pupils are equal, round, and reactive to light.   Neck:      Thyroid: No thyroid mass, thyromegaly or thyroid tenderness.      Vascular: No carotid bruit or JVD.      Trachea: No tracheal deviation.   Cardiovascular:      Rate and Rhythm: Normal rate and regular rhythm.      Chest Wall: PMI is not displaced.      Pulses: Normal pulses.      Heart sounds: Normal heart sounds. No murmur heard.    No friction rub. No gallop.   Pulmonary:      Effort: Pulmonary effort is normal. No respiratory distress.      Breath sounds: Normal breath sounds.   Chest:      Chest wall: No " tenderness.   Breasts:     Breasts are symmetrical.      Right: No swelling, bleeding, inverted nipple, mass, nipple discharge, skin change or tenderness.      Left: No swelling, bleeding, inverted nipple, mass, nipple discharge, skin change or tenderness.   Abdominal:      General: Bowel sounds are normal. There is no distension.      Palpations: Abdomen is soft. There is no mass.      Tenderness: There is no abdominal tenderness.      Hernia: No hernia is present.   Musculoskeletal:         General: No tenderness or deformity. Normal range of motion.      Cervical back: Normal range of motion and neck supple.      Right lower leg: No edema.      Left lower leg: No edema.   Lymphadenopathy:      Head:      Right side of head: No submental, submandibular, tonsillar, preauricular, posterior auricular or occipital adenopathy.      Left side of head: No submental, submandibular, tonsillar, preauricular, posterior auricular or occipital adenopathy.      Cervical: No cervical adenopathy.   Skin:     General: Skin is warm and dry.      Coloration: Skin is not pale.      Findings: No erythema or rash.   Neurological:      General: No focal deficit present.      Mental Status: She is alert and oriented to person, place, and time.   Psychiatric:         Attention and Perception: Attention normal.         Speech: Speech normal.         Behavior: Behavior normal. Behavior is cooperative.         Thought Content: Thought content normal.                   Diagnoses and all orders for this visit:    1. Annual physical exam (Primary)  -     Vitamin B12; Future  -     Iron Profile; Future  -     Ferritin; Future  -     CBC (No Diff); Future  -     Comprehensive Metabolic Panel; Future  -     Lipid Panel; Future    2. B12 deficiency  -     Vitamin B12; Future    3. Iron deficiency anemia secondary to inadequate dietary iron intake  -     Iron Profile; Future  -     Ferritin; Future    4. Need for COVID-19 vaccine  -     COVID-19  (Pfizer) Bivalent 12+yrs    5. Encounter for surveillance of contraceptive pills  -     drospirenone-ethinyl estradiol (Machelle) 3-0.03 MG per tablet; Take 1 tablet by mouth Daily.  Dispense: 84 tablet; Refill: 3      Continue current method of contraception.  Check routine labs as above.  Recheck vitamin B-12 and iron levels as historically deficient in both of these and not currently on supplement.      Labs  ordered as above if indicated- will notify of results and treat accordingly. If patient has not received results within one week via mychart or letter, they will notify my office  Immunizations and screenings: preventative/screenings are up-to-date/addressed as noted in the HPI.  Counseling: The patient is advised to lose weight, continue to increase physical activity, healthy diet.  Follow-up in 1 year for routine annual exam      Follow up: Return in about 1 year (around 5/9/2024) for Annual, and need to collect labs today.  Plan of care discussed with pt. They verbalized understanding and agreement.     Electronically signed by : ARVIN Valle   5/9/2023   09:59 EDT

## 2023-05-14 ENCOUNTER — PATIENT MESSAGE (OUTPATIENT)
Dept: INTERNAL MEDICINE | Facility: CLINIC | Age: 28
End: 2023-05-14
Payer: COMMERCIAL

## 2023-05-14 DIAGNOSIS — D50.8 IRON DEFICIENCY ANEMIA SECONDARY TO INADEQUATE DIETARY IRON INTAKE: Primary | ICD-10-CM

## 2023-05-15 RX ORDER — DOXYCYCLINE HYCLATE 50 MG/1
324 CAPSULE, GELATIN COATED ORAL
Qty: 30 TABLET | Refills: 3 | Status: SHIPPED | OUTPATIENT
Start: 2023-05-15

## 2023-05-15 NOTE — TELEPHONE ENCOUNTER
From: Maddy Chisholm  To: Vero REAL John  Sent: 5/14/2023 7:49 AM EDT  Subject: iron    can you please send in the prescription of the iron :)  i am a little worried though. i dont know if you remember but last i was on the prescribed iron i did have the runs. i hope i dont have them this time around.  thanks!!  
Normal for race

## 2023-05-18 ENCOUNTER — TELEMEDICINE (OUTPATIENT)
Dept: PSYCHIATRY | Facility: CLINIC | Age: 28
End: 2023-05-18
Payer: COMMERCIAL

## 2023-05-18 DIAGNOSIS — F51.5 NIGHTMARES: ICD-10-CM

## 2023-05-18 DIAGNOSIS — F33.1 MAJOR DEPRESSIVE DISORDER, RECURRENT EPISODE, MODERATE: Primary | Chronic | ICD-10-CM

## 2023-05-18 DIAGNOSIS — F41.9 ANXIETY DISORDER, UNSPECIFIED TYPE: Chronic | ICD-10-CM

## 2023-05-18 RX ORDER — PRAZOSIN HYDROCHLORIDE 1 MG/1
1 CAPSULE ORAL NIGHTLY
Qty: 30 CAPSULE | Refills: 0 | Status: SHIPPED | OUTPATIENT
Start: 2023-05-18

## 2023-05-18 RX ORDER — LAMOTRIGINE 25 MG/1
75 TABLET ORAL DAILY
Qty: 90 TABLET | Refills: 0 | Status: SHIPPED | OUTPATIENT
Start: 2023-05-18

## 2023-05-18 RX ORDER — VORTIOXETINE 20 MG/1
20 TABLET, FILM COATED ORAL
Qty: 30 TABLET | Refills: 0 | Status: SHIPPED | OUTPATIENT
Start: 2023-05-18

## 2023-06-15 ENCOUNTER — TELEMEDICINE (OUTPATIENT)
Dept: PSYCHIATRY | Facility: CLINIC | Age: 28
End: 2023-06-15
Payer: COMMERCIAL

## 2023-06-15 DIAGNOSIS — F41.9 ANXIETY DISORDER, UNSPECIFIED TYPE: Chronic | ICD-10-CM

## 2023-06-15 DIAGNOSIS — F51.5 NIGHTMARES: ICD-10-CM

## 2023-06-15 DIAGNOSIS — F33.1 MAJOR DEPRESSIVE DISORDER, RECURRENT EPISODE, MODERATE: Primary | Chronic | ICD-10-CM

## 2023-06-15 RX ORDER — PRAZOSIN HYDROCHLORIDE 1 MG/1
1 CAPSULE ORAL NIGHTLY
Qty: 30 CAPSULE | Refills: 1 | Status: SHIPPED | OUTPATIENT
Start: 2023-06-15

## 2023-06-15 RX ORDER — VORTIOXETINE 20 MG/1
20 TABLET, FILM COATED ORAL
Qty: 30 TABLET | Refills: 1 | Status: SHIPPED | OUTPATIENT
Start: 2023-06-15

## 2023-06-15 RX ORDER — LAMOTRIGINE 25 MG/1
75 TABLET ORAL DAILY
Qty: 90 TABLET | Refills: 1 | Status: SHIPPED | OUTPATIENT
Start: 2023-06-15

## 2023-06-15 NOTE — PROGRESS NOTES
"This provider is located at the [facility] (through [facility]), [address] [facility] [address] [facility], [facility], [address] using a secure [employer] Video Visit through [employer]. Patient is being seen remotely via telehealth at their home address in [place], and stated they are in a secure environment for this session. The patient's condition being diagnosed/treated is appropriate for telemedicine. The provider identified herself as well as her credentials.   The patient, and/or patients guardian, consent to be seen remotely, and when consent is given they understand that the consent allows for patient identifiable information to be sent to a third party as needed.   They may refuse to be seen remotely at any time. The electronic data is encrypted and password protected, and the patient and/or guardian has been advised of the potential risks to privacy not withstanding such measures.    You have chosen to receive care through a telehealth visit.  Do you consent to use a video/audio connection for your medical care today? Yes    Patient identifiers utilized: Name and date of birth.    Patient verbally confirmed consent for today's encounter  6/15/2023 .    Subjective   Maddy Chisholm is a 28 y.o. female who presents today for follow up    Chief Complaint: Medication management follow-up - Mood/depression, anxiety, sleeping difficulties, and nightmares follow-up    Accompanied by: The patient is alone at today's encounter    History of Present Illness:   The patient describes her mood as \"doing good\" since her last encounter with this APRN.  The patient reports her and her boyfriend are looking at apartments, and may be moving into their own apartment soon of which she reports she is excited about.  The patient rates her symptoms of depression at a 5/10 on a 0-10 scale, with 10 being the worst.  The patient rates her symptoms of anxiety at a 6/10 on a 0-10 scale, with 10 being the worst.  " The patient reports her appetite as good.  The patient reports her sleep as good.  The patient reports averaging 8-9 hours of sleep each night.  She reports having occasional nightmares, but not every single night like it used to be.  The patient denies any new medical problems or changes in medications since last appointment with this facility.  The patient reports compliance with her current medication regimen.  The patient denies any side effects, rashes, or concerns from her current medication regimen.  The patient denies any auditory hallucinations or visual hallucinations.  The patient does not endorse any significant symptoms consistent with oskar or psychosis at today's encounter.  The patient denies any suicidal or homicidal ideations, plans, or intent at today's encounter and is convincing.  The patient reports she would like to not adjust or change her current psychotropic medication regimen at today's encounter.      All Known Prior Psychiatric Medications:  -Lexapro - decreased her libido so she stopped taking it (can't remember if it helped her mood or not)  -Prozac  -Wellbutrin  -Hydroxyzine  -Lamictal  -Prazosin  -Trintellix      Last Menstrual Period:  One week ago.  The patient was educated that her prescribed medications can have potential risk to a developing fetus. The patient is advised to contact this APRN/this office if she becomes pregnant or plans to become pregnant.  Pt verbalizes understanding and acknowledged agreement with this plan in her own words.        The following portions of the patient's history were reviewed and updated as appropriate: allergies, current medications, past family history, past medical history, past social history, past surgical history and problem list.            Past Medical History:  Past Medical History:   Diagnosis Date    Anemia     Anxiety     COVID 08/2022    Depression     Vitamin B12 deficiency        Social History:  Social History     Socioeconomic  History    Marital status: Single    Number of children: 0   Tobacco Use    Smoking status: Former     Packs/day: 0.20     Years: 0.25     Pack years: 0.05     Types: Cigarettes     Start date: 2020     Quit date: 8/15/2020     Years since quittin.8    Smokeless tobacco: Never    Tobacco comments:     smoked for 5 years  (black and milds for 5 years then restarted 2020 and quit 2020   Vaping Use    Vaping Use: Never used   Substance and Sexual Activity    Alcohol use: Yes     Comment: rarely    Drug use: Not Currently     Comment: The patient states in her past she has used marijuanna, but not currently    Sexual activity: Yes     Partners: Male     Birth control/protection: OCP       Family History:  Family History   Adopted: Yes       Past Surgical History:  Past Surgical History:   Procedure Laterality Date    NO PAST SURGERIES         Problem List:  Patient Active Problem List   Diagnosis    Iron deficiency anemia secondary to inadequate dietary iron intake    B12 deficiency    Anxiety    BV (bacterial vaginosis)    Allergic rhinitis    Vitiligo    Generalized anxiety disorder    Moderate episode of recurrent major depressive disorder    Restless leg    Excessive sweating       Allergy:   No Known Allergies     Current Medications:   Current Outpatient Medications   Medication Sig Dispense Refill    lamoTRIgine (LaMICtal) 25 MG tablet Take 3 tablets by mouth Daily. 90 tablet 1    prazosin (MINIPRESS) 1 MG capsule Take 1 capsule by mouth Every Night. 30 capsule 1    Vortioxetine HBr (Trintellix) 20 MG tablet Take 1 tablet by mouth Daily With Breakfast. 30 tablet 1    aluminum chloride (DRYSOL) 20 % external solution Apply  topically to the appropriate area as directed Every Night. (Patient not taking: Reported on 2023) 60 mL 1    drospirenone-ethinyl estradiol (Machelle) 3-0.03 MG per tablet Take 1 tablet by mouth Daily. 84 tablet 3    ferrous gluconate (FERGON) 324 MG tablet Take 1 tablet by mouth  Daily With Breakfast. 30 tablet 3    fluticasone (FLONASE) 50 MCG/ACT nasal spray 2 sprays by Each Nare route Daily. 16 g 3     No current facility-administered medications for this visit.       Review of Symptoms:    Review of Systems   Psychiatric/Behavioral:  Positive for sleep disturbance (improved with medication), depressed mood and stress. Negative for agitation, behavioral problems, dysphoric mood, hallucinations, self-injury, suicidal ideas and negative for hyperactivity. The patient is nervous/anxious.        Physical Exam:   not currently breastfeeding. There is no height or weight on file to calculate BMI.   Due to the remote nature of this encounter (virtual encounter), vitals were unable to be obtained.  Height stated at 66.5 inches.  Weight stated around 182 pounds.      Physical Exam  Neurological:      Mental Status: She is alert and oriented to person, place, and time.   Psychiatric:         Attention and Perception: Attention normal.         Mood and Affect: Affect normal. Mood is anxious and depressed.         Speech: Speech normal.         Behavior: Behavior normal. Behavior is cooperative.         Thought Content: Thought content normal. Thought content is not paranoid or delusional. Thought content does not include homicidal or suicidal ideation. Thought content does not include homicidal or suicidal plan.         Cognition and Memory: Cognition and memory normal.         Judgment: Judgment normal.       Mental Status Exam:   Hygiene:   good  Cooperation:  Cooperative  Eye Contact:  Good  Psychomotor Behavior:  Appropriate  Affect:  Appropriate  Mood: depressed and anxious  Hopelessness: Denies  Speech:  Normal  Thought Process:  Linear  Thought Content:  Mood congruent  Suicidal:  None  Homicidal:  None  Hallucinations:  None  Delusion:  None  Memory:  Intact  Orientation:  Person, Place, Time and Situation  Reliability:  good  Insight:  Good  Judgement:  Good  Impulse Control:   Good  Physical/Medical Issues:  No            Lab Results:   No visits with results within 1 Month(s) from this visit.   Latest known visit with results is:   Lab on 05/09/2023   Component Date Value Ref Range Status    Vitamin B-12 05/09/2023 608  211 - 946 pg/mL Final    Iron 05/09/2023 41  37 - 145 mcg/dL Final    Iron Saturation (TSAT) 05/09/2023 7 (L)  20 - 50 % Final    Transferrin 05/09/2023 401 (H)  200 - 360 mg/dL Final    TIBC 05/09/2023 597 (H)  298 - 536 mcg/dL Final    Ferritin 05/09/2023 35.20  13.00 - 150.00 ng/mL Final    WBC 05/09/2023 7.08  3.40 - 10.80 10*3/mm3 Final    RBC 05/09/2023 5.01  3.77 - 5.28 10*6/mm3 Final    Hemoglobin 05/09/2023 11.7 (L)  12.0 - 15.9 g/dL Final    Hematocrit 05/09/2023 36.7  34.0 - 46.6 % Final    MCV 05/09/2023 73.3 (L)  79.0 - 97.0 fL Final    MCH 05/09/2023 23.4 (L)  26.6 - 33.0 pg Final    MCHC 05/09/2023 31.9  31.5 - 35.7 g/dL Final    RDW 05/09/2023 14.6  12.3 - 15.4 % Final    RDW-SD 05/09/2023 38.4  37.0 - 54.0 fl Final    MPV 05/09/2023 10.2  6.0 - 12.0 fL Final    Platelets 05/09/2023 244  140 - 450 10*3/mm3 Final    Glucose 05/09/2023 102 (H)  65 - 99 mg/dL Final    BUN 05/09/2023 10  6 - 20 mg/dL Final    Creatinine 05/09/2023 0.85  0.57 - 1.00 mg/dL Final    Sodium 05/09/2023 137  136 - 145 mmol/L Final    Potassium 05/09/2023 4.2  3.5 - 5.2 mmol/L Final    Chloride 05/09/2023 99  98 - 107 mmol/L Final    CO2 05/09/2023 24.2  22.0 - 29.0 mmol/L Final    Calcium 05/09/2023 9.7  8.6 - 10.5 mg/dL Final    Total Protein 05/09/2023 7.2  6.0 - 8.5 g/dL Final    Albumin 05/09/2023 4.2  3.5 - 5.2 g/dL Final    ALT (SGPT) 05/09/2023 11  1 - 33 U/L Final    AST (SGOT) 05/09/2023 17  1 - 32 U/L Final    Alkaline Phosphatase 05/09/2023 82  39 - 117 U/L Final    Total Bilirubin 05/09/2023 <0.2  0.0 - 1.2 mg/dL Final    Globulin 05/09/2023 3.0  gm/dL Final    A/G Ratio 05/09/2023 1.4  g/dL Final    BUN/Creatinine Ratio 05/09/2023 11.8  7.0 - 25.0 Final    Anion Gap  05/09/2023 13.8  5.0 - 15.0 mmol/L Final    eGFR 05/09/2023 95.8  >60.0 mL/min/1.73 Final    Total Cholesterol 05/09/2023 236 (H)  0 - 200 mg/dL Final    Triglycerides 05/09/2023 191 (H)  0 - 150 mg/dL Final    HDL Cholesterol 05/09/2023 101 (H)  40 - 60 mg/dL Final    LDL Cholesterol  05/09/2023 103 (H)  0 - 100 mg/dL Final    VLDL Cholesterol 05/09/2023 32  5 - 40 mg/dL Final    LDL/HDL Ratio 05/09/2023 0.96   Final         Assessment & Plan   Problems Addressed this Visit    None  Visit Diagnoses       Major depressive disorder, recurrent episode, moderate  (Chronic)   -  Primary    R/O BPD R/O Mood d/o    Relevant Medications    lamoTRIgine (LaMICtal) 25 MG tablet    Vortioxetine HBr (Trintellix) 20 MG tablet    Anxiety disorder, unspecified type  (Chronic)       Relevant Medications    Vortioxetine HBr (Trintellix) 20 MG tablet    Nightmares        Relevant Medications    prazosin (MINIPRESS) 1 MG capsule    Vortioxetine HBr (Trintellix) 20 MG tablet          Diagnoses         Codes Comments    Major depressive disorder, recurrent episode, moderate    -  Primary ICD-10-CM: F33.1  ICD-9-CM: 296.32 R/O BPD R/O Mood d/o    Anxiety disorder, unspecified type     ICD-10-CM: F41.9  ICD-9-CM: 300.00     Nightmares     ICD-10-CM: F51.5  ICD-9-CM: 307.47             Visit Diagnoses:    ICD-10-CM ICD-9-CM   1. Major depressive disorder, recurrent episode, moderate  F33.1 296.32   2. Anxiety disorder, unspecified type  F41.9 300.00   3. Nightmares  F51.5 307.47          GOALS:  Short Term Goals: Patient will be compliant with medication, and patient will have no significant medication related side effects.  Patient will be engaged in psychotherapy as indicated.  Patient will report subjective improvement of symptoms.  Long term goals: To stabilize mood and treat/improve subjective symptoms, the patient will stay out of the hospital, the patient will be at an optimal level of functioning, and the patient will take all  medications as prescribed.  The patient verbalized understanding and agreement with goals that were mutually set.      TREATMENT PLAN: Take medications as indicated.  Restart psychotherapy.  The patient has reported she does not have time for psychotherapy at this time, but will let this APRN/office know if she changes her mind on restarting psychotherapy.  Medication and treatment options, both pharmacological and non-pharmacological treatment options, discussed during today's visit, including any off label use of medication. Patient acknowledged and verbally consented with current treatment plan and was educated on the importance of compliance with treatment and follow-up appointments.      -Continue lamotrigine 75 mg by mouth once daily for mood.  -Continue Prazosin 1 mg by mouth once nightly at bedtime for nightmares.  -Continue Trintellix 20 mg by mouth once daily in the morning with breakfast for moods.      MEDICATION ISSUES:  Discussed medication options and treatment plan of prescribed medication, any off label use of medication, as well as the risks, benefits, any black box warnings including increased suicidality, and side effects including but not limited to potential falls, dizziness, possible impaired driving, GI side effects (change in appetite, abdominal discomfort, nausea, vomiting, diarrhea, and/or constipation), dry mouth, somnolence, sedation, insomnia, activation, agitation, irritation, tremors, abnormal muscle movements or disorders, headache, sweating, possible bruising or rare bleeding, electrolyte and/or fluid abnormalities, change in blood pressure/heart rate/and or heart rhythm, sexual dysfunction, and metabolic adversities among others. Patient and/or guardian agreeable to call the office with any worsening of symptoms or onset of side effects, or if any concerns or questions arise.  The contact information for the office is made available to the patient and/or guardian.  Patient and/or  guardian agreeable to call 911 or go to the nearest ER should they begin having any SI/HI, or if any urgent concerns arise. No medication side effects or related complaints today.    This APRN has discussed the benefits and risks of taking/continuing Lamictal (Lamotrigine).  The side effects of Lamictal can include a benign rash, blurred or double vision, dizziness, ataxia, sedation, headache, tremor, insomnia, poor coordination, fatigue,  nausea, vomiting, dyspepsia, rhinitis, infection, pharyngitis, asthenia, a rare but serious rash, rare multi-organ failure associated with Santana-Jean Syndrome, toxic epidermal necrolysis, drug hypersensitivity syndrome, rare blood dyscrasias, rare aseptic meningitis, rare sudden unexplained deaths in people with epilepsy, withdrawal seizures upon abrupt withdrawal, and rare activation of suicidal ideation and behavior (suicidality).  This APRN has discussed that a very slow dose titration when starting, or changing doses, of Lamictal may reduce the incidence of skin rash and other side effects.  The dosage should not be titrated upwards or increased faster than recommended due to the possibility of the discussed side effects and risk of development of a skin rash (which can become life threatening).    This APRN has also discussed that if the patient stops taking the Lamictal for 3-5 days or longer, it will be necessary to restart the drug with an initial dose titration, as rashes have been reported on reexposure.  If the patient and Provider decide to stop the Lamictal, the patient will follow the directions of this APRN/this office as a guided taper over about two weeks is appropriate due to the risk of relapse in bipolar disorder with those with a mood or bipolar disorder, the risk of seizures in those with epilepsy, and discontinuation symptoms upon rapid discontinuation of Lamictal.    The patient verbalizes understanding of benefits and risks as discussed, the  patient/guardian feels the benefits outweigh the risks and is agreeable to continue/take Lamictal as discussed.  The patient is advised should any side effects or rash develops they are to stop the Lamictal immediately and contact this APRN/this office or go to the emergency department immediately.  The patient verbalizes understanding and agreement with treatment plan in their own words.      VERBAL INFORMED CONSENT FOR MEDICATION:  The patient was educated that their proposed/prescribed psychotropic medication(s) has potential risks, side effects, adverse effects, and black box warnings; and these have been discussed with the patient.  The patient has been informed that their treatment and medication dosage is to be individualized, and may even be above or below the recommended range/dosage due to patient individualization and response, but medication is prescribed using a shared decision making approach, and no medication or dosage will be prescribed without the patient's verbal consent.  The reason for the use of the medication including any off label use and alternative modes of treatment other than or in addition to medication has been considered and discussed, the probable consequences of not receiving the proposed treatment have been discussed, and any treatment side effects, black box warnings, and cautions associated with treatment have been discussed with the patient.  The patient is allowed ample time to openly discuss and ask questions regarding the proposed medication(s) and treatment plan and the patient verbalizes understanding the reasons for the use of the medication, its potential risks and benefits, other alternative treatment(s), and the probable consequences that may occur if the proposed medication is not given.  The patient has been given ample time to ask questions and study the information and find the information to be specific, accurate, and complete.  The patient gives verbal consent for  the medication(s) proposed/prescribed, they verbalized understanding that they can refuse and withdraw consent at any time with the assistance of this APRN, and the patient has verbally confirmed that they are aware, and are willing, to take the prescribed medication and follow the treatment plan with the known possible risks, side effect, black box warnings, and any potential medication interactions, and the patient reports they will be worse off without this medication and treatment plan.  The patient is advised to contact this APRN/this office if any questions or concerns arise at any time (at [phone]), or call 911/go to the closest emergency department if needed or outside of office hours.      SUICIDE RISK ASSESSMENT AND SAFETY PLAN: Unalterable demographics and a history of mental health intervention indicate this patient is in a high risk category compared to the general population. At present, the patient denies active SI/HI, intentions, or plans at this time and agrees to seek immediate care should such thoughts develop. The patient verbalizes understanding of how to access emergency care if needed and agrees to do so. Consideration of suicide risk and protective factors such as history, current presentation, individual strengths and weaknesses, psychosocial and environmental stressors and variables, psychiatric illness and symptoms, medical conditions and pain, took place in this interview. Based on those considerations, the patient is determined: within individual baseline and presenting no imminent risk for suicide or homicide. Other recommendations: The patient does not meet the criteria for inpatient admission and is not a safety risk to self or others at today's visit. Inpatient treatment offers no significant advantages over outpatient treatment for this patient at today's visit.  The patient was given ample time for questions and fully participated in treatment planning.  The patient was  encouraged to call the clinic with any questions or concerns.  The patient was informed of access to emergency care. If patient were to develop any significant symptomatology, suicidal ideation, homicidal ideation, any concerns, or feel unsafe at any time they are to call the clinic and if unable to get immediate assistance should immediately call 911 or go to the nearest emergency room.  Patient contracted verbally for the following: If you are experiencing an emotional crisis or have thoughts of harming yourself or others, please go to your nearest local emergency room or call 911. Will continue to re-assess medication response and side effects frequently to establish efficacy and ensure safety. Risks, any black box warnings, side effects, off label usage, and benefits of medication and treatment discussed with patient, along with potential adverse side effects of current and/or newly prescribed medication, alternative treatment options, and OTC medications.  Patient verbalized understanding of potential risks, any off label use of medication, any black box warnings, and any side effects in their own words. The patient verbalized understanding and agreed to comply with the safety plan discussed in their own words.  Patient given the number to the office. Number also discussed of the 24- hour suicide hotline.           MEDS ORDERED DURING VISIT:  New Medications Ordered This Visit   Medications    lamoTRIgine (LaMICtal) 25 MG tablet     Sig: Take 3 tablets by mouth Daily.     Dispense:  90 tablet     Refill:  1    prazosin (MINIPRESS) 1 MG capsule     Sig: Take 1 capsule by mouth Every Night.     Dispense:  30 capsule     Refill:  1    Vortioxetine HBr (Trintellix) 20 MG tablet     Sig: Take 1 tablet by mouth Daily With Breakfast.     Dispense:  30 tablet     Refill:  1       Return in about 4 weeks (around 7/13/2023), or if symptoms worsen or fail to improve, for Next scheduled follow up and Recheck.        Progress: Not at goal    Functional Status: Mild impairment     Prognosis: Good with Ongoing Treatment      Psychotherapy treatment plan: Previously completed by patient's therapist            This document has been electronically signed by ARVIN Tamez  Raquel 15, 2023 11:24 EDT    Some of the data in this electronic note has been brought forward from a previous encounter, any necessary changes have been made, it has been reviewed by this APRN, and it is accurate.    Please note that portions of this note were completed with a voice recognition program.

## 2023-08-14 ENCOUNTER — TELEMEDICINE (OUTPATIENT)
Dept: PSYCHIATRY | Facility: CLINIC | Age: 28
End: 2023-08-14
Payer: COMMERCIAL

## 2023-08-14 DIAGNOSIS — F51.5 NIGHTMARES: ICD-10-CM

## 2023-08-14 DIAGNOSIS — F41.9 ANXIETY DISORDER, UNSPECIFIED TYPE: Chronic | ICD-10-CM

## 2023-08-14 DIAGNOSIS — F33.1 MAJOR DEPRESSIVE DISORDER, RECURRENT EPISODE, MODERATE: Primary | Chronic | ICD-10-CM

## 2023-08-14 RX ORDER — VORTIOXETINE 20 MG/1
20 TABLET, FILM COATED ORAL
Qty: 30 TABLET | Refills: 0 | Status: SHIPPED | OUTPATIENT
Start: 2023-08-14

## 2023-08-14 RX ORDER — PRAZOSIN HYDROCHLORIDE 1 MG/1
3 CAPSULE ORAL NIGHTLY
Qty: 90 CAPSULE | Refills: 0 | Status: SHIPPED | OUTPATIENT
Start: 2023-08-14

## 2023-08-14 RX ORDER — LAMOTRIGINE 100 MG/1
100 TABLET ORAL DAILY
Qty: 30 TABLET | Refills: 0 | Status: SHIPPED | OUTPATIENT
Start: 2023-08-14

## 2023-08-14 NOTE — PROGRESS NOTES
"This provider is located at the [facility] (through [facility]), [address], [facility], [address] using a secure A[employer] Video Visit through [employer]. Patient is being seen remotely via telehealth at their home address in [place], and stated they are in a secure environment for this session. The patient's condition being diagnosed/treated is appropriate for telemedicine. The provider identified herself as well as her credentials.   The patient, and/or patients guardian, consent to be seen remotely, and when consent is given they understand that the consent allows for patient identifiable information to be sent to a third party as needed.   They may refuse to be seen remotely at any time. The electronic data is encrypted and password protected, and the patient and/or guardian has been advised of the potential risks to privacy not withstanding such measures.    You have chosen to receive care through a telehealth visit.  Do you consent to use a video/audio connection for your medical care today? Yes    Patient identifiers utilized: Name and date of birth.    Patient verbally confirmed consent for today's encounter  8/14/2023 .    Subjective   Maddy Chisholm is a 28 y.o. female who presents today for follow up    Chief Complaint: Medication management follow-up - Mood/depression, anxiety, sleeping difficulties, and nightmares follow-up    Accompanied by: The patient is alone at today's encounter    History of Present Illness:   The patient describes her mood as \"a little more irritable\" since her last encounter with this APRN.  The patient reports some increased situational stressors in her life.  The patient reports her and her boyfriend are staying in their new apartment sometimes, but are also still staying at his parent's house the majority of the time.  She reports hopefully they will soon be staying in their own apartment most of the time.  The patient " "reports with her increase stressors she feels more irritable and has been \"snippy\" and irritable with other people, which she reports is not like herself.  The patient rates her symptoms of depression at a 6/10 on a 0-10 scale, with 10 being the worst.  The patient rates her symptoms of anxiety at a 6-6.5/10 on a 0-10 scale, with 10 being the worst.  The patient reports her appetite as good.  The patient reports her sleep as good overall, but reports having bad dreams every single night.  She reports feeling she is having more bad dreams due to being stressed.  The patient reports the bad dreams and nightmares do not wake her up, but she remembers them when she wakes up in the mornings.  She reports she averages around 8 hours of sleep each night, and reports feeling rested in the daytime.  The patient denies any new medical problems or changes in medications since last appointment with this facility.  The patient reports compliance with her current medication regimen.  The patient denies any side effects, rashes, or concerns from her current medication regimen.  The patient denies any auditory hallucinations or visual hallucinations.  The patient denies any reckless or impulsive behaviors.  The patient does not endorse any significant symptoms consistent with oskar or psychosis at today's encounter.  The patient denies any self-injurious behaviors.  The patient denies any suicidal or homicidal ideations, plans, or intent at today's encounter and is convincing.  The patient reports she would like to try making some adjustments in her current psychotropic medication regimen at today's encounter to assist with her increased irritability and also her increase in frequency and intensity of nightmares.  The patient reports due to all of the stressors in her life she has not attended therapy in several months, but will be restarting psychotherapy tomorrow.      All Known Prior Psychiatric Medications:  -Lexapro - " decreased her libido so she stopped taking it (can't remember if it helped her mood or not)  -Prozac  -Wellbutrin  -Hydroxyzine  -Lamictal  -Prazosin  -Trintellix      Last Menstrual Period:  2023.  The patient was educated that her prescribed medications can have potential risk to a developing fetus. The patient is advised to contact this APRN/this office if she becomes pregnant or plans to become pregnant.  Pt verbalizes understanding and acknowledged agreement with this plan in her own words.        The following portions of the patient's history were reviewed and updated as appropriate: allergies, current medications, past family history, past medical history, past social history, past surgical history and problem list.            Past Medical History:  Past Medical History:   Diagnosis Date    Anemia     Anxiety     COVID 2022    Depression     Vitamin B12 deficiency        Social History:  Social History     Socioeconomic History    Marital status: Single    Number of children: 0   Tobacco Use    Smoking status: Former     Packs/day: 0.20     Years: 0.25     Pack years: 0.05     Types: Cigarettes     Start date: 2020     Quit date: 8/15/2020     Years since quittin.9    Smokeless tobacco: Never    Tobacco comments:     smoked for 5 years  (black and milds for 5 years then restarted 2020 and quit 2020   Vaping Use    Vaping Use: Never used   Substance and Sexual Activity    Alcohol use: Yes     Comment: rarely    Drug use: Not Currently     Comment: The patient states in her past she has used marijuanna, but not currently    Sexual activity: Yes     Partners: Male     Birth control/protection: OCP       Family History:  Family History   Adopted: Yes       Past Surgical History:  Past Surgical History:   Procedure Laterality Date    NO PAST SURGERIES         Problem List:  Patient Active Problem List   Diagnosis    Iron deficiency anemia secondary to inadequate dietary iron intake    B12  deficiency    Anxiety    BV (bacterial vaginosis)    Allergic rhinitis    Vitiligo    Generalized anxiety disorder    Moderate episode of recurrent major depressive disorder    Restless leg    Excessive sweating       Allergy:   No Known Allergies     Current Medications:   Current Outpatient Medications   Medication Sig Dispense Refill    lamoTRIgine (LaMICtal) 100 MG tablet Take 1 tablet by mouth Daily. 30 tablet 0    prazosin (MINIPRESS) 1 MG capsule Take 3 capsules by mouth Every Night. 90 capsule 0    Vortioxetine HBr (Trintellix) 20 MG tablet Take 1 tablet by mouth Daily With Breakfast. 30 tablet 0    aluminum chloride (DRYSOL) 20 % external solution Apply  topically to the appropriate area as directed Every Night. (Patient not taking: Reported on 5/9/2023) 60 mL 1    drospirenone-ethinyl estradiol (Machelle) 3-0.03 MG per tablet Take 1 tablet by mouth Daily. 84 tablet 3    ferrous gluconate (FERGON) 324 MG tablet Take 1 tablet by mouth Daily With Breakfast. 30 tablet 3    fluticasone (FLONASE) 50 MCG/ACT nasal spray 2 sprays by Each Nare route Daily. 16 g 3     No current facility-administered medications for this visit.       Review of Symptoms:    Review of Systems   Psychiatric/Behavioral:  Positive for sleep disturbance (improved with medication), depressed mood and stress. Negative for agitation, behavioral problems, dysphoric mood, hallucinations, self-injury, suicidal ideas and negative for hyperactivity. The patient is nervous/anxious.        Physical Exam:   not currently breastfeeding. There is no height or weight on file to calculate BMI.   Due to the remote nature of this encounter (virtual encounter), vitals were unable to be obtained.  Height stated at 66.5 inches.  Weight stated around 182 pounds.      Physical Exam  Neurological:      Mental Status: She is alert and oriented to person, place, and time.   Psychiatric:         Attention and Perception: Attention normal.         Mood and Affect:  Affect normal. Mood is anxious and depressed.         Speech: Speech normal.         Behavior: Behavior normal. Behavior is cooperative.         Thought Content: Thought content normal. Thought content is not paranoid or delusional. Thought content does not include homicidal or suicidal ideation. Thought content does not include homicidal or suicidal plan.         Cognition and Memory: Cognition and memory normal.         Judgment: Judgment normal.       Mental Status Exam:   Hygiene:   good  Cooperation:  Cooperative  Eye Contact:  Good  Psychomotor Behavior:  Appropriate  Affect:  Appropriate  Mood: depressed and anxious  Hopelessness: Denies  Speech:  Normal  Thought Process:  Linear  Thought Content:  Mood congruent  Suicidal:  None  Homicidal:  None  Hallucinations:  None  Delusion:  None  Memory:  Intact  Orientation:  Person, Place, Time and Situation  Reliability:  good  Insight:  Good  Judgement:  Good  Impulse Control:  Good  Physical/Medical Issues:  No            Lab Results:   No visits with results within 1 Month(s) from this visit.   Latest known visit with results is:   Lab on 05/09/2023   Component Date Value Ref Range Status    Vitamin B-12 05/09/2023 608  211 - 946 pg/mL Final    Iron 05/09/2023 41  37 - 145 mcg/dL Final    Iron Saturation (TSAT) 05/09/2023 7 (L)  20 - 50 % Final    Transferrin 05/09/2023 401 (H)  200 - 360 mg/dL Final    TIBC 05/09/2023 597 (H)  298 - 536 mcg/dL Final    Ferritin 05/09/2023 35.20  13.00 - 150.00 ng/mL Final    WBC 05/09/2023 7.08  3.40 - 10.80 10*3/mm3 Final    RBC 05/09/2023 5.01  3.77 - 5.28 10*6/mm3 Final    Hemoglobin 05/09/2023 11.7 (L)  12.0 - 15.9 g/dL Final    Hematocrit 05/09/2023 36.7  34.0 - 46.6 % Final    MCV 05/09/2023 73.3 (L)  79.0 - 97.0 fL Final    MCH 05/09/2023 23.4 (L)  26.6 - 33.0 pg Final    MCHC 05/09/2023 31.9  31.5 - 35.7 g/dL Final    RDW 05/09/2023 14.6  12.3 - 15.4 % Final    RDW-SD 05/09/2023 38.4  37.0 - 54.0 fl Final    MPV  05/09/2023 10.2  6.0 - 12.0 fL Final    Platelets 05/09/2023 244  140 - 450 10*3/mm3 Final    Glucose 05/09/2023 102 (H)  65 - 99 mg/dL Final    BUN 05/09/2023 10  6 - 20 mg/dL Final    Creatinine 05/09/2023 0.85  0.57 - 1.00 mg/dL Final    Sodium 05/09/2023 137  136 - 145 mmol/L Final    Potassium 05/09/2023 4.2  3.5 - 5.2 mmol/L Final    Chloride 05/09/2023 99  98 - 107 mmol/L Final    CO2 05/09/2023 24.2  22.0 - 29.0 mmol/L Final    Calcium 05/09/2023 9.7  8.6 - 10.5 mg/dL Final    Total Protein 05/09/2023 7.2  6.0 - 8.5 g/dL Final    Albumin 05/09/2023 4.2  3.5 - 5.2 g/dL Final    ALT (SGPT) 05/09/2023 11  1 - 33 U/L Final    AST (SGOT) 05/09/2023 17  1 - 32 U/L Final    Alkaline Phosphatase 05/09/2023 82  39 - 117 U/L Final    Total Bilirubin 05/09/2023 <0.2  0.0 - 1.2 mg/dL Final    Globulin 05/09/2023 3.0  gm/dL Final    A/G Ratio 05/09/2023 1.4  g/dL Final    BUN/Creatinine Ratio 05/09/2023 11.8  7.0 - 25.0 Final    Anion Gap 05/09/2023 13.8  5.0 - 15.0 mmol/L Final    eGFR 05/09/2023 95.8  >60.0 mL/min/1.73 Final    Total Cholesterol 05/09/2023 236 (H)  0 - 200 mg/dL Final    Triglycerides 05/09/2023 191 (H)  0 - 150 mg/dL Final    HDL Cholesterol 05/09/2023 101 (H)  40 - 60 mg/dL Final    LDL Cholesterol  05/09/2023 103 (H)  0 - 100 mg/dL Final    VLDL Cholesterol 05/09/2023 32  5 - 40 mg/dL Final    LDL/HDL Ratio 05/09/2023 0.96   Final         Assessment & Plan   Problems Addressed this Visit    None  Visit Diagnoses       Major depressive disorder, recurrent episode, moderate  (Chronic)   -  Primary    R/O BPD R/O BPAD    Relevant Medications    lamoTRIgine (LaMICtal) 100 MG tablet    Vortioxetine HBr (Trintellix) 20 MG tablet    Anxiety disorder, unspecified type  (Chronic)       Relevant Medications    Vortioxetine HBr (Trintellix) 20 MG tablet    Nightmares        Relevant Medications    prazosin (MINIPRESS) 1 MG capsule    Vortioxetine HBr (Trintellix) 20 MG tablet          Diagnoses         Codes  Comments    Major depressive disorder, recurrent episode, moderate    -  Primary ICD-10-CM: F33.1  ICD-9-CM: 296.32 R/O BPD R/O BPAD    Anxiety disorder, unspecified type     ICD-10-CM: F41.9  ICD-9-CM: 300.00     Nightmares     ICD-10-CM: F51.5  ICD-9-CM: 307.47             Visit Diagnoses:    ICD-10-CM ICD-9-CM   1. Major depressive disorder, recurrent episode, moderate  F33.1 296.32   2. Anxiety disorder, unspecified type  F41.9 300.00   3. Nightmares  F51.5 307.47          GOALS:  Short Term Goals: Patient will be compliant with medication, and patient will have no significant medication related side effects.  Patient will be engaged in psychotherapy as indicated.  Patient will report subjective improvement of symptoms.  Long term goals: To stabilize mood and treat/improve subjective symptoms, the patient will stay out of the hospital, the patient will be at an optimal level of functioning, and the patient will take all medications as prescribed.  The patient verbalized understanding and agreement with goals that were mutually set.      TREATMENT PLAN: Take medications as indicated.  Restart psychotherapy.  Medication and treatment options, both pharmacological and non-pharmacological treatment options, discussed during today's visit, including any off label use of medication. Patient acknowledged and verbally consented with current treatment plan and was educated on the importance of compliance with treatment and follow-up appointments.      -Continue Trintellix 20 mg by mouth once daily in the morning with breakfast for moods.  -Increase lamotrigine to 100 mg by mouth once daily for mood.  -Increase prazosin to 3 mg by mouth once nightly at bedtime for nightmares.      MEDICATION ISSUES:  Discussed medication options and treatment plan of prescribed medication, any off label use of medication, as well as the risks, benefits, any black box warnings including increased suicidality, and side effects including but  not limited to potential falls, dizziness, possible impaired driving, GI side effects (change in appetite, abdominal discomfort, nausea, vomiting, diarrhea, and/or constipation), dry mouth, somnolence, sedation, insomnia, activation, agitation, irritation, tremors, abnormal muscle movements or disorders, headache, sweating, possible bruising or rare bleeding, electrolyte and/or fluid abnormalities, change in blood pressure/heart rate/and or heart rhythm, sexual dysfunction, and metabolic adversities among others. Patient and/or guardian agreeable to call the office with any worsening of symptoms or onset of side effects, or if any concerns or questions arise.  The contact information for the office is made available to the patient and/or guardian.  Patient and/or guardian agreeable to call 911 or go to the nearest ER should they begin having any SI/HI, or if any urgent concerns arise. No medication side effects or related complaints today.    This APRN has discussed the benefits and risks of taking/continuing Lamictal (Lamotrigine).  The side effects of Lamictal can include a benign rash, blurred or double vision, dizziness, ataxia, sedation, headache, tremor, insomnia, poor coordination, fatigue,  nausea, vomiting, dyspepsia, rhinitis, infection, pharyngitis, asthenia, a rare but serious rash, rare multi-organ failure associated with Santana-Jean Syndrome, toxic epidermal necrolysis, drug hypersensitivity syndrome, rare blood dyscrasias, rare aseptic meningitis, rare sudden unexplained deaths in people with epilepsy, withdrawal seizures upon abrupt withdrawal, and rare activation of suicidal ideation and behavior (suicidality).  This APRN has discussed that a very slow dose titration when starting, or changing doses, of Lamictal may reduce the incidence of skin rash and other side effects.  The dosage should not be titrated upwards or increased faster than recommended due to the possibility of the discussed side  effects and risk of development of a skin rash (which can become life threatening).    This APRN has also discussed that if the patient stops taking the Lamictal for 3-5 days or longer, it will be necessary to restart the drug with an initial dose titration, as rashes have been reported on reexposure.  If the patient and Provider decide to stop the Lamictal, the patient will follow the directions of this APRN/this office as a guided taper over about two weeks is appropriate due to the risk of relapse in bipolar disorder with those with a mood or bipolar disorder, the risk of seizures in those with epilepsy, and discontinuation symptoms upon rapid discontinuation of Lamictal.    The patient verbalizes understanding of benefits and risks as discussed, the patient/guardian feels the benefits outweigh the risks and is agreeable to continue/take Lamictal as discussed.  The patient is advised should any side effects or rash develops they are to stop the Lamictal immediately and contact this APRN/this office or go to the emergency department immediately.  The patient verbalizes understanding and agreement with treatment plan in their own words.      VERBAL INFORMED CONSENT FOR MEDICATION:  The patient was educated that their proposed/prescribed psychotropic medication(s) has potential risks, side effects, adverse effects, and black box warnings; and these have been discussed with the patient.  The patient has been informed that their treatment and medication dosage is to be individualized, and may even be above or below the recommended range/dosage due to patient individualization and response, but medication is prescribed using a shared decision making approach, and no medication or dosage will be prescribed without the patient's verbal consent.  The reason for the use of the medication including any off label use and alternative modes of treatment other than or in addition to medication has been considered and discussed,  the probable consequences of not receiving the proposed treatment have been discussed, and any treatment side effects, black box warnings, and cautions associated with treatment have been discussed with the patient.  The patient is allowed ample time to openly discuss and ask questions regarding the proposed medication(s) and treatment plan and the patient verbalizes understanding the reasons for the use of the medication, its potential risks and benefits, other alternative treatment(s), and the probable consequences that may occur if the proposed medication is not given.  The patient has been given ample time to ask questions and study the information and find the information to be specific, accurate, and complete.  The patient gives verbal consent for the medication(s) proposed/prescribed, they verbalized understanding that they can refuse and withdraw consent at any time with the assistance of this APRN, and the patient has verbally confirmed that they are aware, and are willing, to take the prescribed medication and follow the treatment plan with the known possible risks, side effect, black box warnings, and any potential medication interactions, and the patient reports they will be worse off without this medication and treatment plan.  The patient is advised to contact this APRN/this office if any questions or concerns arise at any time (at [phone]), or call 911/go to the closest emergency department if needed or outside of office hours.      SUICIDE RISK ASSESSMENT AND SAFETY PLAN: Unalterable demographics and a history of mental health intervention indicate this patient is in a high risk category compared to the general population. At present, the patient denies active SI/HI, intentions, or plans at this time and agrees to seek immediate care should such thoughts develop. The patient verbalizes understanding of how to access emergency care if needed and agrees to do so. Consideration of suicide risk and  protective factors such as history, current presentation, individual strengths and weaknesses, psychosocial and environmental stressors and variables, psychiatric illness and symptoms, medical conditions and pain, took place in this interview. Based on those considerations, the patient is determined: within individual baseline and presenting no imminent risk for suicide or homicide. Other recommendations: The patient does not meet the criteria for inpatient admission and is not a safety risk to self or others at today's visit. Inpatient treatment offers no significant advantages over outpatient treatment for this patient at today's visit.  The patient was given ample time for questions and fully participated in treatment planning.  The patient was encouraged to call the clinic with any questions or concerns.  The patient was informed of access to emergency care. If patient were to develop any significant symptomatology, suicidal ideation, homicidal ideation, any concerns, or feel unsafe at any time they are to call the clinic and if unable to get immediate assistance should immediately call 911 or go to the nearest emergency room.  Patient contracted verbally for the following: If you are experiencing an emotional crisis or have thoughts of harming yourself or others, please go to your nearest local emergency room or call 911. Will continue to re-assess medication response and side effects frequently to establish efficacy and ensure safety. Risks, any black box warnings, side effects, off label usage, and benefits of medication and treatment discussed with patient, along with potential adverse side effects of current and/or newly prescribed medication, alternative treatment options, and OTC medications.  Patient verbalized understanding of potential risks, any off label use of medication, any black box warnings, and any side effects in their own words. The patient verbalized understanding and agreed to comply with  the safety plan discussed in their own words.  Patient given the number to the office. Number also discussed of the 24- hour suicide hotline.           MEDS ORDERED DURING VISIT:  New Medications Ordered This Visit   Medications    lamoTRIgine (LaMICtal) 100 MG tablet     Sig: Take 1 tablet by mouth Daily.     Dispense:  30 tablet     Refill:  0    prazosin (MINIPRESS) 1 MG capsule     Sig: Take 3 capsules by mouth Every Night.     Dispense:  90 capsule     Refill:  0    Vortioxetine HBr (Trintellix) 20 MG tablet     Sig: Take 1 tablet by mouth Daily With Breakfast.     Dispense:  30 tablet     Refill:  0       Return in about 4 weeks (around 9/11/2023), or if symptoms worsen or fail to improve, for Next scheduled follow up and Recheck.       Progress: Not at goal    Functional Status: Mild impairment     Prognosis: Good with Ongoing Treatment      Psychotherapy treatment plan: Completed by patient's therapist            This document has been electronically signed by ARVIN Tamez  August 14, 2023 11:37 EDT    Some of the data in this electronic note has been brought forward from a previous encounter, any necessary changes have been made, it has been reviewed by this APRN, and it is accurate.    Please note that portions of this note were completed with a voice recognition program.

## 2023-08-15 ENCOUNTER — TELEMEDICINE (OUTPATIENT)
Dept: PSYCHIATRY | Facility: CLINIC | Age: 28
End: 2023-08-15
Payer: COMMERCIAL

## 2023-08-15 DIAGNOSIS — F33.1 MODERATE EPISODE OF RECURRENT MAJOR DEPRESSIVE DISORDER: Primary | ICD-10-CM

## 2023-08-15 DIAGNOSIS — F41.1 GENERALIZED ANXIETY DISORDER: ICD-10-CM
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
## 2023-08-30 ENCOUNTER — TELEMEDICINE (OUTPATIENT)
Dept: PSYCHIATRY | Facility: CLINIC | Age: 28
End: 2023-08-30
Payer: COMMERCIAL

## 2023-08-30 DIAGNOSIS — F41.1 GENERALIZED ANXIETY DISORDER: Primary | ICD-10-CM

## 2023-08-30 DIAGNOSIS — F33.1 MODERATE EPISODE OF RECURRENT MAJOR DEPRESSIVE DISORDER: ICD-10-CM

## 2023-08-30 NOTE — PROGRESS NOTES
Date: October 1, 2023  Time In: 10:12 AM  Time Out: 10:58 AM  This provider [facility]), [address], [place] [address] using a secure CREAThart Video Visit through [employer]. Patient is being seen remotely via telehealth at home address in [place] and stated they are in a secure environment for this session. The patient's condition being diagnosed/treated is appropriate for telemedicine. The provider identified herself as well as her credentials. The patient, and/or patients guardian, consent to be seen remotely, and when consent is given they understand that the consent allows for patient identifiable information to be sent to a third party as needed. They may refuse to be seen remotely at any time. The electronic data is encrypted and password protected, and the patient and/or guardian has been advised of the potential risks to privacy not withstanding such measures.     You have chosen to receive care through a telehealth visit.  Do you consent to use a video/audio connection for your medical care today? Yes    PROGRESS NOTE  Data:  Maddy Chisholm is a 28 y.o. female who presents today for follow up and care planning.  Patient states that she is at her new place and that she and her boyfriend have moved his paco system to the new place which has prompted them to stay at the new place more. Patient states that she has been feeling nervous and scared but also excited to be spending the night in their new place. Patient reports being most excited about having more privacy and living as an adult which is something that she has wanted to do for awhile but felt that she lacked some of the necessary skills in order to do so. Patient states that she is scared of being alone in the apartment when her boyfriend is not there; patient is not accustomed to being left alone as she and her boyfriend have lived with his parents and she  has never been on her own. Patient states that she is looking forward to working with her boyfriend to acclimate their pets to the new apartment which she feels will help her somewhat when her boyfriend is at work. Patient wishes work on decreasing her feelings of anxiety and depression and improving her relationship and communication.      Chief Complaint: anxiety, depression, adjusting to new environment, irrational fears at times    History of Present Illness: patient has battled with anxiety and depression for several years      Clinical Maneuvering/Intervention: care planning; CBT    (Scales based on 0 - 10 with 10 being the worst)  Depression: 7 Anxiety: 7       Assisted patient in developing a care plan to address her current needs and concerns and set baselines; processing above session content; acknowledged and normalized patient’s thoughts, feelings, and concerns.  Rationalized patient thought process regarding her needs and moving forward with living more as an adult.  Discussed triggers associated with patient's feelings of anxiety and depression such as finishing up moving in to her new apartment, being alone in the apartment and learning to be more independent.  Also discussed coping skills for patient to implement such as working out a budget plan with her boyfriend, asking people she trusts to help her learn to do tasks and take care of things that she is unsure of how to do and reassuring herself that she is safe in her home and possibly trying to meet her neighbors to help decrease her anxiety about being alone in the apartment.    Allowed patient to freely discuss issues without interruption or judgment. Provided safe, confidential environment to facilitate the development of positive therapeutic relationship and encourage open, honest communication. Assisted patient in identifying risk factors which would indicate the need for higher level of care including thoughts to harm self or others and/or  self-harming behavior and encouraged patient to contact this office, call 911, or present to the nearest emergency room should any of these events occur. Discussed crisis intervention services and means to access. Patient adamantly and convincingly denies current suicidal or homicidal ideation or perceptual disturbance.    Assessment:   Assessment   Patient was able to work with therapist in order to develop a care plan to address current needs and concerns and set baselines for the identified concerns on the care plan. Patient struggles with depression and anxiety which continues to cause impairment in important areas of functioning.  A result, they can be reasonably expected to continue to benefit from treatment and would likely be at increased risk for decompensation otherwise.    Mental Status Exam:   Hygiene:   good  Cooperation:  Cooperative  Eye Contact:  Good  Psychomotor Behavior:  Appropriate  Affect:  Appropriate  Mood: depressed and anxious  Speech:  Normal  Thought Process:  Goal directed  Thought Content:  Normal  Suicidal:  None  Homicidal:  None  Hallucinations:  None  Delusion:  None  Memory:  Intact  Orientation:  Person, Place, Time, and Situation  Reliability:  good  Insight:  Fair  Judgement:  Good  Impulse Control:  Good  Physical/Medical Issues:  No        Patient's Support Network Includes:  significant other and extended family    Functional Status: Moderate impairment     Progress toward goal: Not at goal; goals were established today with the development of the care plan    Prognosis: Good with Ongoing Treatment          Plan:    Patient will continue in individual outpatient therapy with focus on improved functioning and coping skills, maintaining stability, and avoiding decompensation and the need for higher level of care.    Patient will adhere to medication regimen as prescribed and report any side effects. Patient will contact this office, call 911 or present to the nearest emergency  room should suicidal or homicidal ideations occur. Provide Cognitive Behavioral Therapy and Solution Focused Therapy to improve functioning, maintain stability, and avoid decompensation and the need for higher level of care.     Return in about 3 weeks, or earlier if symptoms worsen or fail to improve.           VISIT DIAGNOSIS:     ICD-10-CM ICD-9-CM   1. Generalized anxiety disorder  F41.1 300.02   2. Moderate episode of recurrent major depressive disorder  F33.1 296.32             This document has been electronically signed by PJ Toussaint  October 1, 2023 08:59 EDT      Part of this note may be an electronic transcription/translation of spoken language to printed text using the Dragon Dictation System.   Hide Additional Notes?: No Detail Level: Detailed

## 2023-09-21 ENCOUNTER — TELEMEDICINE (OUTPATIENT)
Dept: PSYCHIATRY | Facility: CLINIC | Age: 28
End: 2023-09-21
Payer: COMMERCIAL

## 2023-09-21 DIAGNOSIS — F33.1 MODERATE EPISODE OF RECURRENT MAJOR DEPRESSIVE DISORDER: ICD-10-CM

## 2023-09-21 DIAGNOSIS — F41.1 GENERALIZED ANXIETY DISORDER: Primary | ICD-10-CM

## 2023-09-21 NOTE — PROGRESS NOTES
Date: September 21, 2023  Time In: 10:50 AM   Time Out: 11:30 AM   This provider is located at the [facility]through [facility]), [address], [place] [address] using a secure [employer] Video Visit through [employer]. Patient is being seen remotely via telehealth at home address in [place] and stated they are in a secure environment for this session. The patient's condition being diagnosed/treated is appropriate for telemedicine. The provider identified herself as well as her credentials. The patient, and/or patients guardian, consent to be seen remotely, and when consent is given they understand that the consent allows for patient identifiable information to be sent to a third party as needed. They may refuse to be seen remotely at any time. The electronic data is encrypted and password protected, and the patient and/or guardian has been advised of the potential risks to privacy not withstanding such measures.     You have chosen to receive care through a telehealth visit.  Do you consent to use a video/audio connection for your medical care today? Yes    PROGRESS NOTE  Data: ** patient lost internet service a couple of times and then lost connection entirely**  Maddy Chisholm is a 28 y.o. female who presents today for follow up. Patient states that she and her boyfriend are still not moved into their apartment. Patient states that they are staying in the apartment but have been waiting on each other to move things in the the apartment. Patient states that most things are out of her boyfriend's parents home. Patient states that she and her boyfriend have no time together. Patient states that she been trying to take care of her sick cat who was rescue. Patient states that she has had an increase in her dosage of prazosin about a month ago to help her deal with her sexual dreams that were occurring nightly but states that she is still bothered by the dreams.  Patient states that she used to watch a lot of porn and fetish stuff and stopped when she started dating her current boyfriend three years ago. Patient states that she stopped watching porn out of respect for her relationship. Patient states that at night she has flashbacks to watching porn and thinks of what she has seen. Patient states that rarely she will have a dream about men in her past. Patient states that if she is around a man whom she finds to be attractive then he could also end up in her dreams at times. Patient states that she watched porn from the ages of [age] when she met her current boyfriend. Patient states that during this time she was in [place] and was drinking and enjoyed the initial attraction she could get from various men but that led to promiscuity. Patient states that since she was a child she was told how pretty she was and as she grew up and didn't have friends but once she figured out that others found her to be attractive she took advantage of that which led to promiscuity. Patient states that she wanted her first sexual experience but after that it was not meaningful to her and she chose to sleep with people whom she knew that she wouldn't have meaningful relationships with. Patient states that she doesn't want to have those dreams as she and her boyfriend have both talked about them and per the patient the dreams upset both of them. Patient wants to feel that her relationship is grounded and that her past no longer controls her.     Chief Complaint: anxiety, sexual dreams, feelings of depression, time constraints    History of Present Illness: Patient has struggled with anxiety and depression for several years.      Clinical Maneuvering/Intervention:  CBT    (Scales based on 0 - 10 with 10 being the worst)  Depression: fluctuates Anxiety: fluctuates       Assisted patient in processing above session content; acknowledged and normalized patient’s thoughts, feelings, and concerns.   Rationalized patient thought process regarding her sexual dreams.  Discussed triggers associated with patient's feelings of anxiety and depression such as time constraints, sexualized dreams and being out from under parental control for the first time ever.  Also discussed coping skills for patient to implement such as writing down her dreams, understanding that there is no judgement toward her when she is speaking of mental health concerns so that she can speak openly and freely and reminding herself that she is no longer the same person as she was in the past and that she has made changes for the better.    Allowed patient to freely discuss issues without interruption or judgment. Provided safe, confidential environment to facilitate the development of positive therapeutic relationship and encourage open, honest communication. Assisted patient in identifying risk factors which would indicate the need for higher level of care including thoughts to harm self or others and/or self-harming behavior and encouraged patient to contact this office, call 911, or present to the nearest emergency room should any of these events occur. Discussed crisis intervention services and means to access. Patient adamantly and convincingly denies current suicidal or homicidal ideation or perceptual disturbance.    Assessment:   Assessment   Patient appears to maintain relative stability as compared to their baseline.  However, patient continues to struggle with depression and anxiety which continues to cause impairment in important areas of functioning.  A result, they can be reasonably expected to continue to benefit from treatment and would likely be at increased risk for decompensation otherwise.    Mental Status Exam:   Hygiene:   good  Cooperation:  Cooperative  Eye Contact:  Good  Psychomotor Behavior:  Appropriate  Affect:  Appropriate  Mood: fluctates  Speech:  Normal  Thought Process:  Goal directed  Thought Content:   Normal  Suicidal:  None  Homicidal:  None  Hallucinations:  None  Delusion:  None  Memory:  Intact  Orientation:  Person, Place, Time, and Situation  Reliability:  good  Insight:  Good  Judgement:  Good  Impulse Control:  Good  Physical/Medical Issues:  No        Patient's Support Network Includes:  significant other    Functional Status: Moderate impairment     Progress toward goal: Not at goal    Prognosis: Good with Ongoing Treatment          Plan:    Patient will continue in individual outpatient therapy with focus on improved functioning and coping skills, maintaining stability, and avoiding decompensation and the need for higher level of care.    Patient will adhere to medication regimen as prescribed and report any side effects. Patient will contact this office, call 911 or present to the nearest emergency room should suicidal or homicidal ideations occur. Provide Cognitive Behavioral Therapy and Solution Focused Therapy to improve functioning, maintain stability, and avoid decompensation and the need for higher level of care.     Return in about 2 weeks, or earlier if symptoms worsen or fail to improve.           VISIT DIAGNOSIS:     ICD-10-CM ICD-9-CM   1. Generalized anxiety disorder  F41.1 300.02   2. Moderate episode of recurrent major depressive disorder  F33.1 296.32             This document has been electronically signed by PJ Toussaint  September 21, 2023 15:31 EDT      Part of this note may be an electronic transcription/translation of spoken language to printed text using the Dragon Dictation System.

## 2023-09-23 DIAGNOSIS — F51.5 NIGHTMARES: ICD-10-CM

## 2023-09-23 DIAGNOSIS — F33.1 MAJOR DEPRESSIVE DISORDER, RECURRENT EPISODE, MODERATE: Chronic | ICD-10-CM

## 2023-09-25 RX ORDER — PRAZOSIN HYDROCHLORIDE 1 MG/1
CAPSULE ORAL
Qty: 90 CAPSULE | Refills: 0 | Status: SHIPPED | OUTPATIENT
Start: 2023-09-25 | End: 2023-09-26 | Stop reason: SDUPTHER

## 2023-09-25 RX ORDER — LAMOTRIGINE 100 MG/1
100 TABLET ORAL DAILY
Qty: 30 TABLET | Refills: 0 | Status: SHIPPED | OUTPATIENT
Start: 2023-09-25 | End: 2023-09-26 | Stop reason: SDUPTHER

## 2023-09-26 DIAGNOSIS — F51.5 NIGHTMARES: ICD-10-CM

## 2023-09-26 DIAGNOSIS — F33.1 MAJOR DEPRESSIVE DISORDER, RECURRENT EPISODE, MODERATE: Chronic | ICD-10-CM

## 2023-09-26 RX ORDER — LAMOTRIGINE 100 MG/1
100 TABLET ORAL DAILY
Qty: 30 TABLET | Refills: 0 | Status: SHIPPED | OUTPATIENT
Start: 2023-09-26

## 2023-09-26 RX ORDER — PRAZOSIN HYDROCHLORIDE 1 MG/1
3 CAPSULE ORAL NIGHTLY
Qty: 90 CAPSULE | Refills: 0 | Status: SHIPPED | OUTPATIENT
Start: 2023-09-26

## 2023-09-26 NOTE — TELEPHONE ENCOUNTER
Patient has been without medications for 4 days but would like to start on the same dosage that she has been on.  Please advise.

## 2023-10-09 ENCOUNTER — TELEMEDICINE (OUTPATIENT)
Dept: PSYCHIATRY | Facility: CLINIC | Age: 28
End: 2023-10-09
Payer: COMMERCIAL

## 2023-10-09 DIAGNOSIS — F33.1 MAJOR DEPRESSIVE DISORDER, RECURRENT EPISODE, MODERATE: Primary | Chronic | ICD-10-CM

## 2023-10-09 DIAGNOSIS — F41.9 ANXIETY DISORDER, UNSPECIFIED TYPE: Chronic | ICD-10-CM

## 2023-10-09 DIAGNOSIS — F51.5 NIGHTMARES: ICD-10-CM

## 2023-10-09 PROCEDURE — 1160F RVW MEDS BY RX/DR IN RCRD: CPT | Performed by: NURSE PRACTITIONER

## 2023-10-09 PROCEDURE — 1159F MED LIST DOCD IN RCRD: CPT | Performed by: NURSE PRACTITIONER

## 2023-10-09 PROCEDURE — 99214 OFFICE O/P EST MOD 30 MIN: CPT | Performed by: NURSE PRACTITIONER

## 2023-10-09 RX ORDER — PRAZOSIN HYDROCHLORIDE 1 MG/1
3 CAPSULE ORAL NIGHTLY
Qty: 90 CAPSULE | Refills: 0 | Status: SHIPPED | OUTPATIENT
Start: 2023-10-09

## 2023-10-09 RX ORDER — LAMOTRIGINE 100 MG/1
100 TABLET ORAL DAILY
Qty: 30 TABLET | Refills: 0 | Status: SHIPPED | OUTPATIENT
Start: 2023-10-09

## 2023-10-09 RX ORDER — VORTIOXETINE 20 MG/1
20 TABLET, FILM COATED ORAL
Qty: 30 TABLET | Refills: 0 | Status: SHIPPED | OUTPATIENT
Start: 2023-10-09

## 2023-10-09 NOTE — PROGRESS NOTES
"This provider is located at the [facility] (through [facility]), [address], [facility], [address] using a secure [employer] Video Visit through [employer]. Patient is being seen remotely via telehealth at their home address in [place], and stated they are in a secure environment for this session. The patient's condition being diagnosed/treated is appropriate for telemedicine. The provider identified herself as well as her credentials.   The patient, and/or patients guardian, consent to be seen remotely, and when consent is given they understand that the consent allows for patient identifiable information to be sent to a third party as needed.   They may refuse to be seen remotely at any time. The electronic data is encrypted and password protected, and the patient and/or guardian has been advised of the potential risks to privacy not withstanding such measures.    You have chosen to receive care through a telehealth visit.  Do you consent to use a video/audio connection for your medical care today? Yes    Patient identifiers utilized: Name and date of birth.    Patient verbally confirmed consent for today's encounter  10/9/2023 .    The patient does verbally confirm that they are being seen today while in the [facility].  The  provider/this APRN is licensed in the [facility] where the patient is located/being seen.    Subjective   Maddy Chisholm is a 28 y.o. female who presents today for follow up    Chief Complaint: Medication management follow-up - Mood/depression, anxiety, sleeping difficulties, and nightmares follow-up    Accompanied by: The patient is alone at today's encounter    History of Present Illness:   The patient describes her mood as \"good\" and stable since her last encounter with this APRN.  The patient reports she does have some \"circumstances\", and situational stressors, currently in her life that are affecting her depressive and " anxious symptoms.  The patient rates her symptoms of depression at a 7/10 on a 0-10 scale, with 10 being the worst.  The patient rates her symptoms of anxiety at a 9/10 on a 0-10 scale, with 10 being the worst.  The patient reports she does feel her depressive and anxious symptoms will improve when there is resolution of some of the situational stressors.  The patient reports her appetite as good.  The patient reports her sleep as good.  The patient reports occasional nightmares, but not as frequently since increasing the prazosin dosage at last encounter.  The patient denies any new medical problems or changes in medications since last appointment with this facility.  The patient reports compliance with her current medication regimen.  The patient denies any side effects, rashes, or concerns from her current medication regimen.  The patient denies any auditory hallucinations or visual hallucinations.  The patient denies any reckless or impulsive behaviors.  The patient does not endorse any significant symptoms consistent with oskar or psychosis at today's encounter.  The patient denies any self-harming behaviors.  The patient denies any suicidal or homicidal ideations, plans, or intent at today's encounter and is convincing.  The patient reports she would like to not adjust or change her current treatment regimen at today's encounter.  The patient does verbally contract for safety at today's encounter and is in verbal agreement with the safety/crisis plan. The patient reports in their own words that they will reach out to this APRN/office prior to next scheduled appointment if there is any worsening of mood, any new psychiatric symptoms, any medication side effects or concerns, any concern for safety to self or others, any suicidal or homicidal ideations plans or intent, or any concerns, or they will call 911, call or text the suicide and crisis lifeline at [phone], or go to the closest emergency department.        All  Known Prior Psychiatric Medications:  -Lexapro - decreased her libido so she stopped taking it (can't remember if it helped her mood or not)  -Prozac  -Wellbutrin  -Hydroxyzine  -Lamictal  -Prazosin  -Trintellix      Last Menstrual Period:  The patient denies chance of pregnancy.  The patient was educated that her prescribed medications can have potential risk to a developing fetus. The patient is advised to contact this APRN/this office if she becomes pregnant or plans to become pregnant.  Pt verbalizes understanding and acknowledged agreement with this plan in her own words.        The following portions of the patient's history were reviewed and updated as appropriate: allergies, current medications, past family history, past medical history, past social history, past surgical history and problem list.            Past Medical History:  Past Medical History:   Diagnosis Date    Anemia     Anxiety     COVID 08/2022    Depression     Vitamin B12 deficiency        Social History:  Social History     Socioeconomic History    Marital status: Single    Number of children: 0   Tobacco Use    Smoking status: Former     Packs/day: 0.20     Years: 0.25     Additional pack years: 0.00     Total pack years: 0.05     Types: Cigarettes     Start date: 5/1/2020     Quit date: 8/15/2020     Years since quitting: 3.1    Smokeless tobacco: Never    Tobacco comments:     smoked for 5 years  (black and milds for 5 years then restarted 5/2020 and quit 8/2020   Vaping Use    Vaping Use: Never used   Substance and Sexual Activity    Alcohol use: Yes     Comment: rarely    Drug use: Not Currently     Comment: The patient states in her past she has used marijuanna, but not currently    Sexual activity: Yes     Partners: Male     Birth control/protection: OCP       Family History:  Family History   Adopted: Yes       Past Surgical History:  Past Surgical History:   Procedure Laterality Date    NO PAST SURGERIES         Problem  List:  Patient Active Problem List   Diagnosis    Iron deficiency anemia secondary to inadequate dietary iron intake    B12 deficiency    Anxiety    BV (bacterial vaginosis)    Allergic rhinitis    Vitiligo    Generalized anxiety disorder    Moderate episode of recurrent major depressive disorder    Restless leg    Excessive sweating       Allergy:   No Known Allergies     Current Medications:   Current Outpatient Medications   Medication Sig Dispense Refill    lamoTRIgine (LaMICtal) 100 MG tablet Take 1 tablet by mouth Daily. 30 tablet 0    prazosin (MINIPRESS) 1 MG capsule Take 3 capsules by mouth Every Night. 90 capsule 0    Vortioxetine HBr (Trintellix) 20 MG tablet Take 1 tablet by mouth Daily With Breakfast. 30 tablet 0    drospirenone-ethinyl estradiol (Machelle) 3-0.03 MG per tablet Take 1 tablet by mouth Daily. 84 tablet 3    ferrous gluconate (FERGON) 324 MG tablet Take 1 tablet by mouth Daily With Breakfast. 30 tablet 3    fluticasone (FLONASE) 50 MCG/ACT nasal spray 2 sprays by Each Nare route Daily. 16 g 3     No current facility-administered medications for this visit.           Review of Symptoms:    Review of Systems   Psychiatric/Behavioral:  Positive for depressed mood and stress. Negative for agitation, behavioral problems, dysphoric mood, hallucinations, self-injury, suicidal ideas and negative for hyperactivity. Sleep disturbance: improved with medication.The patient is nervous/anxious.          Physical Exam:   not currently breastfeeding. There is no height or weight on file to calculate BMI.   Due to the remote nature of this encounter (virtual encounter), vitals were unable to be obtained.  Height stated at 66.5 inches.  Weight stated around 182 pounds.      Physical Exam  Neurological:      Mental Status: She is alert and oriented to person, place, and time.   Psychiatric:         Attention and Perception: Attention normal.         Mood and Affect: Affect normal. Mood is anxious and depressed.          Speech: Speech normal.         Behavior: Behavior normal. Behavior is cooperative.         Thought Content: Thought content normal. Thought content is not paranoid or delusional. Thought content does not include homicidal or suicidal ideation. Thought content does not include homicidal or suicidal plan.         Cognition and Memory: Cognition and memory normal.         Judgment: Judgment normal.         Mental Status Exam:   Hygiene:   good  Cooperation:  Cooperative  Eye Contact:  Good  Psychomotor Behavior:  Appropriate  Affect:  Appropriate  Mood: depressed and anxious  Hopelessness: Denies  Speech:  Normal  Thought Process:  Linear  Thought Content:  Mood congruent  Suicidal:  None  Homicidal:  None  Hallucinations:  None  Delusion:  None  Memory:  Intact  Orientation:  Person, Place, Time and Situation  Reliability:  good  Insight:  Good  Judgement:  Good  Impulse Control:  Good  Physical/Medical Issues:  No            Lab Results:   No visits with results within 1 Month(s) from this visit.   Latest known visit with results is:   Lab on 05/09/2023   Component Date Value Ref Range Status    Vitamin B-12 05/09/2023 608  211 - 946 pg/mL Final    Iron 05/09/2023 41  37 - 145 mcg/dL Final    Iron Saturation (TSAT) 05/09/2023 7 (L)  20 - 50 % Final    Transferrin 05/09/2023 401 (H)  200 - 360 mg/dL Final    TIBC 05/09/2023 597 (H)  298 - 536 mcg/dL Final    Ferritin 05/09/2023 35.20  13.00 - 150.00 ng/mL Final    WBC 05/09/2023 7.08  3.40 - 10.80 10*3/mm3 Final    RBC 05/09/2023 5.01  3.77 - 5.28 10*6/mm3 Final    Hemoglobin 05/09/2023 11.7 (L)  12.0 - 15.9 g/dL Final    Hematocrit 05/09/2023 36.7  34.0 - 46.6 % Final    MCV 05/09/2023 73.3 (L)  79.0 - 97.0 fL Final    MCH 05/09/2023 23.4 (L)  26.6 - 33.0 pg Final    MCHC 05/09/2023 31.9  31.5 - 35.7 g/dL Final    RDW 05/09/2023 14.6  12.3 - 15.4 % Final    RDW-SD 05/09/2023 38.4  37.0 - 54.0 fl Final    MPV 05/09/2023 10.2  6.0 - 12.0 fL Final    Platelets  05/09/2023 244  140 - 450 10*3/mm3 Final    Glucose 05/09/2023 102 (H)  65 - 99 mg/dL Final    BUN 05/09/2023 10  6 - 20 mg/dL Final    Creatinine 05/09/2023 0.85  0.57 - 1.00 mg/dL Final    Sodium 05/09/2023 137  136 - 145 mmol/L Final    Potassium 05/09/2023 4.2  3.5 - 5.2 mmol/L Final    Chloride 05/09/2023 99  98 - 107 mmol/L Final    CO2 05/09/2023 24.2  22.0 - 29.0 mmol/L Final    Calcium 05/09/2023 9.7  8.6 - 10.5 mg/dL Final    Total Protein 05/09/2023 7.2  6.0 - 8.5 g/dL Final    Albumin 05/09/2023 4.2  3.5 - 5.2 g/dL Final    ALT (SGPT) 05/09/2023 11  1 - 33 U/L Final    AST (SGOT) 05/09/2023 17  1 - 32 U/L Final    Alkaline Phosphatase 05/09/2023 82  39 - 117 U/L Final    Total Bilirubin 05/09/2023 <0.2  0.0 - 1.2 mg/dL Final    Globulin 05/09/2023 3.0  gm/dL Final    A/G Ratio 05/09/2023 1.4  g/dL Final    BUN/Creatinine Ratio 05/09/2023 11.8  7.0 - 25.0 Final    Anion Gap 05/09/2023 13.8  5.0 - 15.0 mmol/L Final    eGFR 05/09/2023 95.8  >60.0 mL/min/1.73 Final    Total Cholesterol 05/09/2023 236 (H)  0 - 200 mg/dL Final    Triglycerides 05/09/2023 191 (H)  0 - 150 mg/dL Final    HDL Cholesterol 05/09/2023 101 (H)  40 - 60 mg/dL Final    LDL Cholesterol  05/09/2023 103 (H)  0 - 100 mg/dL Final    VLDL Cholesterol 05/09/2023 32  5 - 40 mg/dL Final    LDL/HDL Ratio 05/09/2023 0.96   Final         Assessment & Plan   Problems Addressed this Visit    None  Visit Diagnoses       Major depressive disorder, recurrent episode, moderate  (Chronic)   -  Primary    R/O BPD R/O BPAD    Relevant Medications    Vortioxetine HBr (Trintellix) 20 MG tablet    lamoTRIgine (LaMICtal) 100 MG tablet    Anxiety disorder, unspecified type  (Chronic)       Relevant Medications    Vortioxetine HBr (Trintellix) 20 MG tablet    Nightmares        Relevant Medications    Vortioxetine HBr (Trintellix) 20 MG tablet    prazosin (MINIPRESS) 1 MG capsule          Diagnoses         Codes Comments    Major depressive disorder, recurrent  episode, moderate    -  Primary ICD-10-CM: F33.1  ICD-9-CM: 296.32 R/O BPD R/O BPAD    Anxiety disorder, unspecified type     ICD-10-CM: F41.9  ICD-9-CM: 300.00     Nightmares     ICD-10-CM: F51.5  ICD-9-CM: 307.47             Visit Diagnoses:    ICD-10-CM ICD-9-CM   1. Major depressive disorder, recurrent episode, moderate  F33.1 296.32   2. Anxiety disorder, unspecified type  F41.9 300.00   3. Nightmares  F51.5 307.47            GOALS:  Short Term Goals: Patient will be compliant with medication, and patient will have no significant medication related side effects.  Patient will be engaged in psychotherapy as indicated.  Patient will report subjective improvement of symptoms.  Long term goals: To stabilize mood and treat/improve subjective symptoms, the patient will stay out of the hospital, the patient will be at an optimal level of functioning, and the patient will take all medications as prescribed.  The patient verbalized understanding and agreement with goals that were mutually set.      TREATMENT PLAN: Take medications as indicated.  Restart psychotherapy.  Medication and treatment options, both pharmacological and non-pharmacological treatment options, discussed during today's visit, including any off label use of medication. Patient acknowledged and verbally consented with current treatment plan and was educated on the importance of compliance with treatment and follow-up appointments.      -Continue Trintellix 20 mg by mouth once daily in the morning with breakfast for moods.  -Continue lamotrigine 100 mg by mouth once daily for mood.  -Continue prazosin 3 mg by mouth once nightly at bedtime for nightmares.      MEDICATION ISSUES:  Discussed medication options and treatment plan of prescribed medication, any off label use of medication, as well as the risks, benefits, any black box warnings including increased suicidality, and side effects including but not limited to potential falls, dizziness, possible  impaired driving, GI side effects (change in appetite, abdominal discomfort, nausea, vomiting, diarrhea, and/or constipation), dry mouth, somnolence, sedation, insomnia, activation, agitation, irritation, tremors, abnormal muscle movements or disorders, headache, sweating, possible bruising or rare bleeding, electrolyte and/or fluid abnormalities, change in blood pressure/heart rate/and or heart rhythm, sexual dysfunction, and metabolic adversities among others. Patient and/or guardian agreeable to call the office with any worsening of symptoms or onset of side effects, or if any concerns or questions arise.  The contact information for the office is made available to the patient and/or guardian.  Patient and/or guardian agreeable to call 911 or go to the nearest ER should they begin having any SI/HI, or if any urgent concerns arise. No medication side effects or related complaints today.    This APRN has discussed the benefits and risks of taking/continuing Lamictal (Lamotrigine).  The side effects of Lamictal can include a benign rash, blurred or double vision, dizziness, ataxia, sedation, headache, tremor, insomnia, poor coordination, fatigue,  nausea, vomiting, dyspepsia, rhinitis, infection, pharyngitis, asthenia, a rare but serious rash, rare multi-organ failure associated with Santana-Jean Syndrome, toxic epidermal necrolysis, drug hypersensitivity syndrome, rare blood dyscrasias, rare aseptic meningitis, rare sudden unexplained deaths in people with epilepsy, withdrawal seizures upon abrupt withdrawal, and rare activation of suicidal ideation and behavior (suicidality).  This APRN has discussed that a very slow dose titration when starting, or changing doses, of Lamictal may reduce the incidence of skin rash and other side effects.  The dosage should not be titrated upwards or increased faster than recommended due to the possibility of the discussed side effects and risk of development of a skin rash  (which can become life threatening).    This APRN has also discussed that if the patient stops taking the Lamictal for 3-5 days or longer, it will be necessary to restart the drug with an initial dose titration, as rashes have been reported on reexposure.  If the patient and Provider decide to stop the Lamictal, the patient will follow the directions of this APRN/this office as a guided taper over about two weeks is appropriate due to the risk of relapse in bipolar disorder with those with a mood or bipolar disorder, the risk of seizures in those with epilepsy, and discontinuation symptoms upon rapid discontinuation of Lamictal.    The patient verbalizes understanding of benefits and risks as discussed, the patient/guardian feels the benefits outweigh the risks and is agreeable to continue/take Lamictal as discussed.  The patient is advised should any side effects or rash develops they are to stop the Lamictal immediately and contact this APRN/this office or go to the emergency department immediately.  The patient verbalizes understanding and agreement with treatment plan in their own words.      VERBAL INFORMED CONSENT FOR MEDICATION:  The patient was educated that their proposed/prescribed psychotropic medication(s) has potential risks, side effects, adverse effects, and black box warnings; and these have been discussed with the patient.  The patient has been informed that their treatment and medication dosage is to be individualized, and may even be above or below the recommended range/dosage due to patient individualization and response, but medication is prescribed using a shared decision making approach, and no medication or dosage will be prescribed without the patient's verbal consent.  The reason for the use of the medication including any off label use and alternative modes of treatment other than or in addition to medication has been considered and discussed, the probable consequences of not receiving the  proposed treatment have been discussed, and any treatment side effects, black box warnings, and cautions associated with treatment have been discussed with the patient.  The patient is allowed ample time to openly discuss and ask questions regarding the proposed medication(s) and treatment plan and the patient verbalizes understanding the reasons for the use of the medication, its potential risks and benefits, other alternative treatment(s), and the probable consequences that may occur if the proposed medication is not given.  The patient has been given ample time to ask questions and study the information and find the information to be specific, accurate, and complete.  The patient gives verbal consent for the medication(s) proposed/prescribed, they verbalized understanding that they can refuse and withdraw consent at any time with the assistance of this APRN, and the patient has verbally confirmed that they are aware, and are willing, to take the prescribed medication and follow the treatment plan with the known possible risks, side effect, black box warnings, and any potential medication interactions, and the patient reports they will be worse off without this medication and treatment plan.  The patient is advised to contact this APRN/this office if any questions or concerns arise at any time (at [phone]), or call 911/go to the closest emergency department if needed or outside of office hours.      SUICIDE RISK ASSESSMENT AND SAFETY PLAN: Unalterable demographics and a history of mental health intervention indicate this patient is in a high risk category compared to the general population. At present, the patient denies active SI/HI, intentions, or plans at this time and agrees to seek immediate care should such thoughts develop. The patient verbalizes understanding of how to access emergency care if needed and agrees to do so. Consideration of suicide risk and protective factors such as history, current  presentation, individual strengths and weaknesses, psychosocial and environmental stressors and variables, psychiatric illness and symptoms, medical conditions and pain, took place in this interview. Based on those considerations, the patient is determined: within individual baseline and presenting no imminent risk for suicide or homicide. Other recommendations: The patient does not meet the criteria for inpatient admission and is not a safety risk to self or others at today's visit. Inpatient treatment offers no significant advantages over outpatient treatment for this patient at today's visit.  The patient was given ample time for questions and fully participated in treatment planning.  The patient was encouraged to call the clinic with any questions or concerns.  The patient was informed of access to emergency care. If patient were to develop any significant symptomatology, suicidal ideation, homicidal ideation, any concerns, or feel unsafe at any time they are to call the clinic and if unable to get immediate assistance should immediately call 911 or go to the nearest emergency room.  Patient contracted verbally for the following: If you are experiencing an emotional crisis or have thoughts of harming yourself or others, please go to your nearest local emergency room or call 911. Will continue to re-assess medication response and side effects frequently to establish efficacy and ensure safety. Risks, any black box warnings, side effects, off label usage, and benefits of medication and treatment discussed with patient, along with potential adverse side effects of current and/or newly prescribed medication, alternative treatment options, and OTC medications.  Patient verbalized understanding of potential risks, any off label use of medication, any black box warnings, and any side effects in their own words. The patient verbalized understanding and agreed to comply with the safety plan discussed in their own words.   Patient given the number to the office. Number also discussed of the 24- hour suicide hotline.           MEDS ORDERED DURING VISIT:  New Medications Ordered This Visit   Medications    Vortioxetine HBr (Trintellix) 20 MG tablet     Sig: Take 1 tablet by mouth Daily With Breakfast.     Dispense:  30 tablet     Refill:  0    prazosin (MINIPRESS) 1 MG capsule     Sig: Take 3 capsules by mouth Every Night.     Dispense:  90 capsule     Refill:  0    lamoTRIgine (LaMICtal) 100 MG tablet     Sig: Take 1 tablet by mouth Daily.     Dispense:  30 tablet     Refill:  0       Return in about 4 weeks (around 11/6/2023), or if symptoms worsen or fail to improve, for Next scheduled follow up and Recheck.       Progress: Not at goal    Functional Status: Moderate impairment     Prognosis: Good with Ongoing Treatment      Psychotherapy treatment plan: Completed by patient's therapist            This document has been electronically signed by ARVIN Tamez  October 9, 2023 11:03 EDT    Some of the data in this electronic note has been brought forward from a previous encounter, any necessary changes have been made, it has been reviewed by this APRN, and it is accurate.    Please note that portions of this note were completed with a voice recognition program.

## 2023-10-11 DIAGNOSIS — D50.8 IRON DEFICIENCY ANEMIA SECONDARY TO INADEQUATE DIETARY IRON INTAKE: ICD-10-CM

## 2023-10-17 RX ORDER — DOXYCYCLINE HYCLATE 50 MG/1
324 CAPSULE, GELATIN COATED ORAL
Qty: 30 TABLET | Refills: 0 | Status: SHIPPED | OUTPATIENT
Start: 2023-10-17

## 2023-10-30 ENCOUNTER — TELEMEDICINE (OUTPATIENT)
Dept: PSYCHIATRY | Facility: CLINIC | Age: 28
End: 2023-10-30
Payer: COMMERCIAL

## 2023-10-30 NOTE — PROGRESS NOTES
Date: October 30, 2023  Time In: 9:19 AM  Time Out: 9:25 AM  This provider is located at the [facility] (through [facility]), [address] [place] [address] using a secure CardioVI[employer]art Video Visit through [employer]. Patient is being seen remotely via telehealth at home address in [place] and stated they are in a secure environment for this session. The patient's condition being diagnosed/treated is appropriate for telemedicine. The provider identified herself as well as her credentials. The patient, and/or patients guardian, consent to be seen remotely, and when consent is given they understand that the consent allows for patient identifiable information to be sent to a third party as needed. They may refuse to be seen remotely at any time. The electronic data is encrypted and password protected, and the patient and/or guardian has been advised of the potential risks to privacy not withstanding such measures.     You have chosen to receive care through a telehealth visit.  Do you consent to use a video/audio connection for your medical care today? Yes    Therapist attempted to connect to the patient but was unable to do so. Patient ended up cancelling the session with office staff due to internet connection issues. Therapist did not see patient.         This document has been electronically signed by PJ Toussaint  October 30, 2023 09:19 EDT      Part of this note may be an electronic transcription/translation of spoken language to printed text using the Dragon Dictation System.

## 2023-11-06 ENCOUNTER — TELEMEDICINE (OUTPATIENT)
Dept: PSYCHIATRY | Facility: CLINIC | Age: 28
End: 2023-11-06
Payer: COMMERCIAL

## 2023-11-06 DIAGNOSIS — F41.9 ANXIETY DISORDER, UNSPECIFIED TYPE: Chronic | ICD-10-CM

## 2023-11-06 DIAGNOSIS — F33.1 MAJOR DEPRESSIVE DISORDER, RECURRENT EPISODE, MODERATE: Primary | Chronic | ICD-10-CM

## 2023-11-06 DIAGNOSIS — F51.5 NIGHTMARES: ICD-10-CM

## 2023-11-06 PROCEDURE — 1159F MED LIST DOCD IN RCRD: CPT | Performed by: NURSE PRACTITIONER

## 2023-11-06 PROCEDURE — 99214 OFFICE O/P EST MOD 30 MIN: CPT | Performed by: NURSE PRACTITIONER

## 2023-11-06 PROCEDURE — 1160F RVW MEDS BY RX/DR IN RCRD: CPT | Performed by: NURSE PRACTITIONER

## 2023-11-06 RX ORDER — LAMOTRIGINE 100 MG/1
100 TABLET ORAL DAILY
Qty: 30 TABLET | Refills: 0 | Status: SHIPPED | OUTPATIENT
Start: 2023-11-06

## 2023-11-06 RX ORDER — PRAZOSIN HYDROCHLORIDE 1 MG/1
3 CAPSULE ORAL NIGHTLY
Qty: 90 CAPSULE | Refills: 0 | Status: SHIPPED | OUTPATIENT
Start: 2023-11-06

## 2023-11-06 RX ORDER — VORTIOXETINE 20 MG/1
20 TABLET, FILM COATED ORAL
Qty: 30 TABLET | Refills: 0 | Status: SHIPPED | OUTPATIENT
Start: 2023-11-06

## 2023-11-06 NOTE — PROGRESS NOTES
This provider is located at the [facility] (through [facility]), [address], [facility], [address] using a secure [employer] Video Visit through [employer]. Patient is being seen remotely via telehealth at their home address in [place], and stated they are in a secure environment for this session. The patient's condition being diagnosed/treated is appropriate for telemedicine. The provider identified herself as well as her credentials.   The patient, and/or patients guardian, consent to be seen remotely, and when consent is given they understand that the consent allows for patient identifiable information to be sent to a third party as needed.   They may refuse to be seen remotely at any time. The electronic data is encrypted and password protected, and the patient and/or guardian has been advised of the potential risks to privacy not withstanding such measures.    You have chosen to receive care through a telehealth visit.  Do you consent to use a video/audio connection for your medical care today? Yes    Patient identifiers utilized: Name and date of birth.    Patient verbally confirmed consent for today's encounter  11/06/2023 .    The patient does verbally confirm that they are being seen today while in the [facility].  The  provider/this APRN is licensed in the [facility] where the patient is located/being seen.    Subjective   Maddy Chisholm is a 28 y.o. female who presents today for follow up    Chief Complaint: Medication management follow-up - Mood/depression, anxiety, sleeping difficulties, and nightmares follow-up    Accompanied by: The patient is alone at today's encounter    History of Present Illness:   The patient describes her mood as doing good and remaining stable since her last encounter with this APRN.  The patient reports she feels her moods have been pretty level recently, and she does not feel she needs any changes in her current  treatment regimen.  The patient rates her symptoms of depression at a 7/10 on a 0-10 scale, with 10 being the worst.  The patient reports she kind of likes feeling a little bit sad.  The patient rates her symptoms of anxiety at a 6/10 on a 0-10 scale, with 10 being the worst.  The patient reports her appetite as good overall.  The patient reports she and her boyfriend recently realized they have been eating out a lot recently, and they are trying to cut back on this due to financial reasons and also health reasons.  The patient reports she also found they were drinking alcohol more than usual, only once to twice per week, but reports this is more than what they typically would drink and they plan to cut back on this as well because they do not want this to increase in frequency and/or amount.  The patient reports when she drinks alcohol she only has 1-2 servings, and will rarely have a third serving of alcohol.  The patient reports alcohol is not a problem for her, she is not reliant upon alcohol, and denies any symptoms of alcohol use disorder.  The patient is advised to not drink alcohol and drive.  The patient reports she is actually going later today to possibly purchase a new car, of which she reports being excited about.  The patient reports with the payment for the new car her and the boyfriend will not be able to eat out as much, so she thinks this will relieve some of the financial stressors of eating out a lot and also she will not have the access to alcohol in the restaurant that she has recently had.  The patient reports her sleep as good overall.  The patient does not report any recent nightmares.  The patient denies any new medical problems or changes in medications since last appointment with this facility.  The patient reports compliance with her current medication regimen.  The patient denies any side effects, rashes, or concerns from her current medication regimen.  The patient denies any auditory  hallucinations or visual hallucinations.  The patient denies any reckless or impulsive behaviors.  The patient does not endorse any significant symptoms consistent with oskar or psychosis at today's encounter.  The patient denies any self-harming behaviors.  The patient denies any suicidal or homicidal ideations, plans, or intent at today's encounter and is convincing.  The patient reports she would like to not adjust or change her current treatment regimen at today's encounter.  The patient reports she has reestablished with her therapist, and will actually be attending therapy again tomorrow.  The patient does verbally contract for safety at today's encounter and is in verbal agreement with the safety/crisis plan. The patient reports in their own words that they will reach out to this APRN/office prior to next scheduled appointment if there is any worsening of mood, any new psychiatric symptoms, any medication side effects or concerns, any concern for safety to self or others, any suicidal or homicidal ideations plans or intent, or any concerns, or they will call 911, call or text the suicide and crisis lifeline at [phone], or go to the closest emergency department.        All Known Prior Psychiatric Medications:  -Lexapro - decreased her libido so she stopped taking it (can't remember if it helped her mood or not)  -Prozac  -Wellbutrin  -Hydroxyzine  -Lamictal  -Prazosin  -Trintellix      Last Menstrual Period:  10/26/2023.  The patient denies chance of pregnancy.  The patient was educated that her prescribed medications can have potential risk to a developing fetus. The patient is advised to contact this APRN/this office if she becomes pregnant or plans to become pregnant.  Pt verbalizes understanding and acknowledged agreement with this plan in her own words.        The following portions of the patient's history were reviewed and updated as appropriate: allergies, current medications, past family history, past  medical history, past social history, past surgical history and problem list.            Past Medical History:  Past Medical History:   Diagnosis Date    Anemia     Anxiety     COVID 08/2022    Depression     Vitamin B12 deficiency        Social History:  Social History     Socioeconomic History    Marital status: Single    Number of children: 0   Tobacco Use    Smoking status: Former     Packs/day: 0.20     Years: 0.25     Additional pack years: 0.00     Total pack years: 0.05     Types: Cigarettes     Start date: 5/1/2020     Quit date: 8/15/2020     Years since quitting: 3.2    Smokeless tobacco: Never    Tobacco comments:     smoked for 5 years  (black and milds for 5 years then restarted 5/2020 and quit 8/2020   Vaping Use    Vaping Use: Never used   Substance and Sexual Activity    Alcohol use: Yes     Comment: Patient reports 1-2 times weekly use    Drug use: Not Currently     Comment: The patient states in her past she has used marijuanna, but not currently    Sexual activity: Yes     Partners: Male     Birth control/protection: OCP       Family History:  Family History   Adopted: Yes       Past Surgical History:  Past Surgical History:   Procedure Laterality Date    NO PAST SURGERIES         Problem List:  Patient Active Problem List   Diagnosis    Iron deficiency anemia secondary to inadequate dietary iron intake    B12 deficiency    Anxiety    BV (bacterial vaginosis)    Allergic rhinitis    Vitiligo    Generalized anxiety disorder    Moderate episode of recurrent major depressive disorder    Restless leg    Excessive sweating       Allergy:   No Known Allergies     Current Medications:   Current Outpatient Medications   Medication Sig Dispense Refill    lamoTRIgine (LaMICtal) 100 MG tablet Take 1 tablet by mouth Daily. 30 tablet 0    prazosin (MINIPRESS) 1 MG capsule Take 3 capsules by mouth Every Night. 90 capsule 0    Vortioxetine HBr (Trintellix) 20 MG tablet Take 1 tablet by mouth Daily With  Breakfast. 30 tablet 0    drospirenone-ethinyl estradiol (Machelle) 3-0.03 MG per tablet Take 1 tablet by mouth Daily. 84 tablet 3    ferrous gluconate (FERGON) 324 MG tablet Take 1 tablet by mouth Daily With Breakfast. 30 tablet 0    fluticasone (FLONASE) 50 MCG/ACT nasal spray 2 sprays by Each Nare route Daily. 16 g 3     No current facility-administered medications for this visit.           Review of Symptoms:    Review of Systems   Psychiatric/Behavioral:  Positive for depressed mood and stress. Negative for agitation, behavioral problems, dysphoric mood, hallucinations, self-injury, suicidal ideas and negative for hyperactivity. Sleep disturbance: improved with medication.The patient is nervous/anxious.          Physical Exam:   not currently breastfeeding. There is no height or weight on file to calculate BMI.   Due to the remote nature of this encounter (virtual encounter), vitals were unable to be obtained.  Height stated at 66.5 inches.  Weight stated around 182 pounds.      Physical Exam  Neurological:      Mental Status: She is alert and oriented to person, place, and time.   Psychiatric:         Attention and Perception: Attention normal.         Mood and Affect: Mood and affect normal.         Speech: Speech normal.         Behavior: Behavior normal. Behavior is cooperative.         Thought Content: Thought content normal. Thought content is not paranoid or delusional. Thought content does not include homicidal or suicidal ideation. Thought content does not include homicidal or suicidal plan.         Cognition and Memory: Cognition and memory normal.         Judgment: Judgment normal.         Mental Status Exam:   Hygiene:   good  Cooperation:  Cooperative  Eye Contact:  Good  Psychomotor Behavior:  Appropriate  Affect:  Appropriate  Mood: normal  Hopelessness: Denies  Speech:  Normal  Thought Process:  Linear  Thought Content:  Mood congruent  Suicidal:  None  Homicidal:  None  Hallucinations:   None  Delusion:  None  Memory:  Intact  Orientation:  Person, Place, Time and Situation  Reliability:  good  Insight:  Good  Judgement:  Good  Impulse Control:  Good  Physical/Medical Issues:  No            Lab Results:   No visits with results within 1 Month(s) from this visit.   Latest known visit with results is:   Lab on 05/09/2023   Component Date Value Ref Range Status    Vitamin B-12 05/09/2023 608  211 - 946 pg/mL Final    Iron 05/09/2023 41  37 - 145 mcg/dL Final    Iron Saturation (TSAT) 05/09/2023 7 (L)  20 - 50 % Final    Transferrin 05/09/2023 401 (H)  200 - 360 mg/dL Final    TIBC 05/09/2023 597 (H)  298 - 536 mcg/dL Final    Ferritin 05/09/2023 35.20  13.00 - 150.00 ng/mL Final    WBC 05/09/2023 7.08  3.40 - 10.80 10*3/mm3 Final    RBC 05/09/2023 5.01  3.77 - 5.28 10*6/mm3 Final    Hemoglobin 05/09/2023 11.7 (L)  12.0 - 15.9 g/dL Final    Hematocrit 05/09/2023 36.7  34.0 - 46.6 % Final    MCV 05/09/2023 73.3 (L)  79.0 - 97.0 fL Final    MCH 05/09/2023 23.4 (L)  26.6 - 33.0 pg Final    MCHC 05/09/2023 31.9  31.5 - 35.7 g/dL Final    RDW 05/09/2023 14.6  12.3 - 15.4 % Final    RDW-SD 05/09/2023 38.4  37.0 - 54.0 fl Final    MPV 05/09/2023 10.2  6.0 - 12.0 fL Final    Platelets 05/09/2023 244  140 - 450 10*3/mm3 Final    Glucose 05/09/2023 102 (H)  65 - 99 mg/dL Final    BUN 05/09/2023 10  6 - 20 mg/dL Final    Creatinine 05/09/2023 0.85  0.57 - 1.00 mg/dL Final    Sodium 05/09/2023 137  136 - 145 mmol/L Final    Potassium 05/09/2023 4.2  3.5 - 5.2 mmol/L Final    Chloride 05/09/2023 99  98 - 107 mmol/L Final    CO2 05/09/2023 24.2  22.0 - 29.0 mmol/L Final    Calcium 05/09/2023 9.7  8.6 - 10.5 mg/dL Final    Total Protein 05/09/2023 7.2  6.0 - 8.5 g/dL Final    Albumin 05/09/2023 4.2  3.5 - 5.2 g/dL Final    ALT (SGPT) 05/09/2023 11  1 - 33 U/L Final    AST (SGOT) 05/09/2023 17  1 - 32 U/L Final    Alkaline Phosphatase 05/09/2023 82  39 - 117 U/L Final    Total Bilirubin 05/09/2023 <0.2  0.0 - 1.2 mg/dL  Final    Globulin 05/09/2023 3.0  gm/dL Final    A/G Ratio 05/09/2023 1.4  g/dL Final    BUN/Creatinine Ratio 05/09/2023 11.8  7.0 - 25.0 Final    Anion Gap 05/09/2023 13.8  5.0 - 15.0 mmol/L Final    eGFR 05/09/2023 95.8  >60.0 mL/min/1.73 Final    Total Cholesterol 05/09/2023 236 (H)  0 - 200 mg/dL Final    Triglycerides 05/09/2023 191 (H)  0 - 150 mg/dL Final    HDL Cholesterol 05/09/2023 101 (H)  40 - 60 mg/dL Final    LDL Cholesterol  05/09/2023 103 (H)  0 - 100 mg/dL Final    VLDL Cholesterol 05/09/2023 32  5 - 40 mg/dL Final    LDL/HDL Ratio 05/09/2023 0.96   Final         Assessment & Plan   Problems Addressed this Visit    None  Visit Diagnoses       Major depressive disorder, recurrent episode, moderate  (Chronic)   -  Primary    R/O BPD R/O BPAD    Relevant Medications    Vortioxetine HBr (Trintellix) 20 MG tablet    lamoTRIgine (LaMICtal) 100 MG tablet    Anxiety disorder, unspecified type  (Chronic)       Relevant Medications    Vortioxetine HBr (Trintellix) 20 MG tablet    Nightmares        Relevant Medications    Vortioxetine HBr (Trintellix) 20 MG tablet    prazosin (MINIPRESS) 1 MG capsule          Diagnoses         Codes Comments    Major depressive disorder, recurrent episode, moderate    -  Primary ICD-10-CM: F33.1  ICD-9-CM: 296.32 R/O BPD R/O BPAD    Anxiety disorder, unspecified type     ICD-10-CM: F41.9  ICD-9-CM: 300.00     Nightmares     ICD-10-CM: F51.5  ICD-9-CM: 307.47             Visit Diagnoses:    ICD-10-CM ICD-9-CM   1. Major depressive disorder, recurrent episode, moderate  F33.1 296.32   2. Anxiety disorder, unspecified type  F41.9 300.00   3. Nightmares  F51.5 307.47              GOALS:  Short Term Goals: Patient will be compliant with medication, and patient will have no significant medication related side effects.  Patient will be engaged in psychotherapy as indicated.  Patient will report subjective improvement of symptoms.  Long term goals: To stabilize mood and treat/improve  subjective symptoms, the patient will stay out of the hospital, the patient will be at an optimal level of functioning, and the patient will take all medications as prescribed.  The patient verbalized understanding and agreement with goals that were mutually set.      TREATMENT PLAN: Take medications as indicated.  Continue psychotherapy.  Medication and treatment options, both pharmacological and non-pharmacological treatment options, discussed during today's visit, including any off label use of medication. Patient acknowledged and verbally consented with current treatment plan and was educated on the importance of compliance with treatment and follow-up appointments.      -Continue Trintellix 20 mg by mouth once daily in the morning with breakfast for moods.  -Continue lamotrigine 100 mg by mouth once daily for mood.  -Continue prazosin 3 mg by mouth once nightly at bedtime for nightmares.      MEDICATION ISSUES:  Discussed medication options and treatment plan of prescribed medication, any off label use of medication, as well as the risks, benefits, any black box warnings including increased suicidality, and side effects including but not limited to potential falls, dizziness, possible impaired driving, GI side effects (change in appetite, abdominal discomfort, nausea, vomiting, diarrhea, and/or constipation), dry mouth, somnolence, sedation, insomnia, activation, agitation, irritation, tremors, abnormal muscle movements or disorders, headache, sweating, possible bruising or rare bleeding, electrolyte and/or fluid abnormalities, change in blood pressure/heart rate/and or heart rhythm, sexual dysfunction, and metabolic adversities among others. Patient and/or guardian agreeable to call the office with any worsening of symptoms or onset of side effects, or if any concerns or questions arise.  The contact information for the office is made available to the patient and/or guardian.  Patient and/or guardian agreeable  to call 911 or go to the nearest ER should they begin having any SI/HI, or if any urgent concerns arise. No medication side effects or related complaints today.    This APRN has discussed the benefits and risks of taking/continuing Lamictal (Lamotrigine).  The side effects of Lamictal can include a benign rash, blurred or double vision, dizziness, ataxia, sedation, headache, tremor, insomnia, poor coordination, fatigue,  nausea, vomiting, dyspepsia, rhinitis, infection, pharyngitis, asthenia, a rare but serious rash, rare multi-organ failure associated with Santana-Jean Syndrome, toxic epidermal necrolysis, drug hypersensitivity syndrome, rare blood dyscrasias, rare aseptic meningitis, rare sudden unexplained deaths in people with epilepsy, withdrawal seizures upon abrupt withdrawal, and rare activation of suicidal ideation and behavior (suicidality).  This APRN has discussed that a very slow dose titration when starting, or changing doses, of Lamictal may reduce the incidence of skin rash and other side effects.  The dosage should not be titrated upwards or increased faster than recommended due to the possibility of the discussed side effects and risk of development of a skin rash (which can become life threatening).    This APRN has also discussed that if the patient stops taking the Lamictal for 3-5 days or longer, it will be necessary to restart the drug with an initial dose titration, as rashes have been reported on reexposure.  If the patient and Provider decide to stop the Lamictal, the patient will follow the directions of this APRN/this office as a guided taper over about two weeks is appropriate due to the risk of relapse in bipolar disorder with those with a mood or bipolar disorder, the risk of seizures in those with epilepsy, and discontinuation symptoms upon rapid discontinuation of Lamictal.    The patient verbalizes understanding of benefits and risks as discussed, the patient/guardian feels the  benefits outweigh the risks and is agreeable to continue/take Lamictal as discussed.  The patient is advised should any side effects or rash develops they are to stop the Lamictal immediately and contact this APRN/this office or go to the emergency department immediately.  The patient verbalizes understanding and agreement with treatment plan in their own words.      VERBAL INFORMED CONSENT FOR MEDICATION:  The patient was educated that their proposed/prescribed psychotropic medication(s) has potential risks, side effects, adverse effects, and black box warnings; and these have been discussed with the patient.  The patient has been informed that their treatment and medication dosage is to be individualized, and may even be above or below the recommended range/dosage due to patient individualization and response, but medication is prescribed using a shared decision making approach, and no medication or dosage will be prescribed without the patient's verbal consent.  The reason for the use of the medication including any off label use and alternative modes of treatment other than or in addition to medication has been considered and discussed, the probable consequences of not receiving the proposed treatment have been discussed, and any treatment side effects, black box warnings, and cautions associated with treatment have been discussed with the patient.  The patient is allowed ample time to openly discuss and ask questions regarding the proposed medication(s) and treatment plan and the patient verbalizes understanding the reasons for the use of the medication, its potential risks and benefits, other alternative treatment(s), and the probable consequences that may occur if the proposed medication is not given.  The patient has been given ample time to ask questions and study the information and find the information to be specific, accurate, and complete.  The patient gives verbal consent for the medication(s)  proposed/prescribed, they verbalized understanding that they can refuse and withdraw consent at any time with the assistance of this APRN, and the patient has verbally confirmed that they are aware, and are willing, to take the prescribed medication and follow the treatment plan with the known possible risks, side effect, black box warnings, and any potential medication interactions, and the patient reports they will be worse off without this medication and treatment plan.  The patient is advised to contact this APRN/this office if any questions or concerns arise at any time (at [phone]), or call 911/go to the closest emergency department if needed or outside of office hours.      SUICIDE RISK ASSESSMENT AND SAFETY PLAN: Unalterable demographics and a history of mental health intervention indicate this patient is in a high risk category compared to the general population. At present, the patient denies active SI/HI, intentions, or plans at this time and agrees to seek immediate care should such thoughts develop. The patient verbalizes understanding of how to access emergency care if needed and agrees to do so. Consideration of suicide risk and protective factors such as history, current presentation, individual strengths and weaknesses, psychosocial and environmental stressors and variables, psychiatric illness and symptoms, medical conditions and pain, took place in this interview. Based on those considerations, the patient is determined: within individual baseline and presenting no imminent risk for suicide or homicide. Other recommendations: The patient does not meet the criteria for inpatient admission and is not a safety risk to self or others at today's visit. Inpatient treatment offers no significant advantages over outpatient treatment for this patient at today's visit.  The patient was given ample time for questions and fully participated in treatment planning.  The patient was encouraged to call the  clinic with any questions or concerns.  The patient was informed of access to emergency care. If patient were to develop any significant symptomatology, suicidal ideation, homicidal ideation, any concerns, or feel unsafe at any time they are to call the clinic and if unable to get immediate assistance should immediately call 911 or go to the nearest emergency room.  Patient contracted verbally for the following: If you are experiencing an emotional crisis or have thoughts of harming yourself or others, please go to your nearest local emergency room or call 911. Will continue to re-assess medication response and side effects frequently to establish efficacy and ensure safety. Risks, any black box warnings, side effects, off label usage, and benefits of medication and treatment discussed with patient, along with potential adverse side effects of current and/or newly prescribed medication, alternative treatment options, and OTC medications.  Patient verbalized understanding of potential risks, any off label use of medication, any black box warnings, and any side effects in their own words. The patient verbalized understanding and agreed to comply with the safety plan discussed in their own words.  Patient given the number to the office. Number also discussed of the 24- hour suicide hotline.           MEDS ORDERED DURING VISIT:  New Medications Ordered This Visit   Medications    Vortioxetine HBr (Trintellix) 20 MG tablet     Sig: Take 1 tablet by mouth Daily With Breakfast.     Dispense:  30 tablet     Refill:  0    lamoTRIgine (LaMICtal) 100 MG tablet     Sig: Take 1 tablet by mouth Daily.     Dispense:  30 tablet     Refill:  0    prazosin (MINIPRESS) 1 MG capsule     Sig: Take 3 capsules by mouth Every Night.     Dispense:  90 capsule     Refill:  0       Return in about 4 weeks (around 12/4/2023), or if symptoms worsen or fail to improve, for Next scheduled follow up and Recheck.       Progress: Not at  goal    Functional Status: Mild impairment     Prognosis: Good with Ongoing Treatment      Psychotherapy treatment plan: Completed by patient's therapist [facility]            This document has been electronically signed by ARVIN Tamez  November 6, 2023 11:07 EST    Some of the data in this electronic note has been brought forward from a previous encounter, any necessary changes have been made, it has been reviewed by this APRN, and it is accurate.    Please note that portions of this note were completed with a voice recognition program.

## 2023-11-07 ENCOUNTER — TELEMEDICINE (OUTPATIENT)
Dept: PSYCHIATRY | Facility: CLINIC | Age: 28
End: 2023-11-07
Payer: COMMERCIAL

## 2023-11-07 DIAGNOSIS — F41.1 GENERALIZED ANXIETY DISORDER: Primary | ICD-10-CM

## 2023-11-07 DIAGNOSIS — F33.1 MODERATE EPISODE OF RECURRENT MAJOR DEPRESSIVE DISORDER: ICD-10-CM

## 2023-11-07 NOTE — PROGRESS NOTES
Date: December 8, 2023  Time In: 9:31 AM   Time Out: 10:12 AM   This provider is located at the [facility]through [facility]), [address], [place] [address] using a secure [employer] Video Visit through iGo. Patient is being seen remotely via telehealth at home address in [place] and stated they are in a secure environment for this session. The patient's condition being diagnosed/treated is appropriate for telemedicine. The provider identified herself as well as her credentials. The patient, and/or patients guardian, consent to be seen remotely, and when consent is given they understand that the consent allows for patient identifiable information to be sent to a third party as needed. They may refuse to be seen remotely at any time. The electronic data is encrypted and password protected, and the patient and/or guardian has been advised of the potential risks to privacy not withstanding such measures.     You have chosen to receive care through a telehealth visit.  Do you consent to use a video/audio connection for your medical care today? Yes    PROGRESS NOTE  Data:  Maddy Chisholm is a 28 y.o. female who presents today for follow up. Patient states that she is in her apartment today and that they have moved everything in now. Patient states that she was able to get a raise at work and she and her boyfriend were also able to get a new car.  Patient states that her friend got a new job at [employer] as a seasonal employee and he is looking forward to seeing how it goes as he may want to stay on after the holiday season. Patient states that she is very concerned about her pet cat as she feels that it is covered with fleas and thinks that the cat could have fleas from her boyfriend's mother's home as she states that her boyfriend's mother works with animals and could be bringing the fleas into her home and the patient's home. Discussed talking with her  boyfriend's mother to see if she is aware of any flea issues that could be causing the patient's cat to have skin issues that are stressful for the patient. Patient states that she doesn't want anyone who may see the cat to think bad of her or her boyfriend and she fears having a flea infestation in her home as she states that she hasn't invited her brothers over because she doesn't want others to see her home or her pets in an negative way. Patient states that she is also concerned about the look of her dog as it is old and doesn't look the best and has started to have accidents. Discussed the care and concerns of the patient in regard to her pets and looking at budgeting for vet care. Patient states that she is not sure about a budget yet as her boyfriend just started his new job and he is wanting to learn to drive and they have the new car to pay for.      Chief Complaint: feelings of anxiety and depression     History of Present Illness: Patient has struggled with anxiety and depression for several years     Clinical Maneuvering/Intervention:  CBT     (Scales based on 0 - 10 with 10 being the worst)  Depression: 7 Anxiety: 6       Assisted patient in processing above session content; acknowledged and normalized patient’s thoughts, feelings, and concerns.  Rationalized patient thought process regarding issues with her pets and her boyfriend changing jobs.  Discussed triggers associated with patient's feelings of anxiety and depression such as the health of her cat, worrying about fleas in her home and finances.  Also discussed coping skills for patient to implement such as talking with her boyfriend's mother about the flea/skin condition of her cat, creating a plan with her boyfriend for him to learn to drive and also working on budgeting with her boyfriend for their new apartment and their needs.    Allowed patient to freely discuss issues without interruption or judgment. Provided safe, confidential environment  to facilitate the development of positive therapeutic relationship and encourage open, honest communication. Assisted patient in identifying risk factors which would indicate the need for higher level of care including thoughts to harm self or others and/or self-harming behavior and encouraged patient to contact this office, call 911, or present to the nearest emergency room should any of these events occur. Discussed crisis intervention services and means to access. Patient adamantly and convincingly denies current suicidal or homicidal ideation or perceptual disturbance.    Assessment:   Assessment   Patient appears to maintain relative stability as compared to their baseline.  However, patient continues to struggle with depression and anxiety which continues to cause impairment in important areas of functioning.  A result, they can be reasonably expected to continue to benefit from treatment and would likely be at increased risk for decompensation otherwise.    Mental Status Exam:   Hygiene:   good  Cooperation:  Cooperative  Eye Contact:  Good  Psychomotor Behavior:  Appropriate  Affect:  Appropriate  Mood: depressed, anxious, and fluctates  Speech:  Normal  Thought Process:  Goal directed  Thought Content:  Normal  Suicidal:  None  Homicidal:  None  Hallucinations:  None  Delusion:  None  Memory:  Intact  Orientation:  Person, Place, Time, and Situation  Reliability:  good  Insight:  Good  Judgement:  Good  Impulse Control:  Good  Physical/Medical Issues:  No        Patient's Support Network Includes:  significant other and extended family    Functional Status: Moderate impairment     Progress toward goal: Not at goal    Prognosis: Good with Ongoing Treatment          Plan:    Patient will continue in individual outpatient therapy with focus on improved functioning and coping skills, maintaining stability, and avoiding decompensation and the need for higher level of care.    Patient will adhere to medication  regimen as prescribed and report any side effects. Patient will contact this office, call 911 or present to the nearest emergency room should suicidal or homicidal ideations occur. Provide Cognitive Behavioral Therapy and Solution Focused Therapy to improve functioning, maintain stability, and avoid decompensation and the need for higher level of care.     Return in about 3 weeks, or earlier if symptoms worsen or fail to improve.           VISIT DIAGNOSIS:     ICD-10-CM ICD-9-CM   1. Generalized anxiety disorder  F41.1 300.02   2. Moderate episode of recurrent major depressive disorder  F33.1 296.32             This document has been electronically signed by PJ Toussaint  December 8, 2023 15:14 EST      Part of this note may be an electronic transcription/translation of spoken language to printed text using the Dragon Dictation System.

## 2023-11-23 DIAGNOSIS — F51.5 NIGHTMARES: ICD-10-CM

## 2023-11-27 ENCOUNTER — TELEMEDICINE (OUTPATIENT)
Dept: PSYCHIATRY | Facility: CLINIC | Age: 28
End: 2023-11-27
Payer: COMMERCIAL

## 2023-11-27 DIAGNOSIS — F33.1 MODERATE EPISODE OF RECURRENT MAJOR DEPRESSIVE DISORDER: ICD-10-CM

## 2023-11-27 DIAGNOSIS — F41.1 GENERALIZED ANXIETY DISORDER: Primary | ICD-10-CM

## 2023-11-27 NOTE — PROGRESS NOTES
Date: January 4, 2024  Time In: 9:24 AM   Time Out: 9:52 AM   This provider is located at the [facility] (through [facility]), [address], [place] [address] using a secure [employer]hart Video Visit through [employer]. Patient is being seen remotely via telehealth at home address in [place] and stated they are in a secure environment for this session. The patient's condition being diagnosed/treated is appropriate for telemedicine. The provider identified herself as well as her credentials. The patient, and/or patients guardian, consent to be seen remotely, and when consent is given they understand that the consent allows for patient identifiable information to be sent to a third party as needed. They may refuse to be seen remotely at any time. The electronic data is encrypted and password protected, and the patient and/or guardian has been advised of the potential risks to privacy not withstanding such measures.     You have chosen to receive care through a telehealth visit.  Do you consent to use a video/audio connection for your medical care today? Yes    PROGRESS NOTE  Data:  Maddy Chisholm is a 28 y.o. female who presents today for follow up. Patient states that she had a good Thanksgiving at her boyfriend's grandmother's home. Patient states that she was able to talk to her family for the holiday but states that her parents will not return from [place] until sometime next year but she has been able to see her brothers. Patient states that she had a rough week at work last week as they were short staffed and have 100 residents. Patient states that she worked hard to make up for the staff that wasn't there. Patient states that she is doing well at work at feels that she has developed her skills more. Patient states  that she is still having to take her boyfriend to work and will have to leave her home soon in order to do so as he doesn't have a set schedule at his  new job. 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 Patient states that she is trying to be positive about her boyfriend's job and is hopeful that he will get hired on after the seasonal time ends. Patient states that she has been struggling with anxiety and depression and has been having regrets about thoughts of the past. Discussed the patient beginning to work on forgiving herself for things that occurred in her past and focusing on her life now and setting goals for what she wants to accomplish.    Chief Complaint: feelings of anxiety and depression, past regrets and thoughts of the past.     History of Present Illness: Patient has struggled with anxiety and depression for several years      Clinical Maneuvering/Intervention: solution focused    (Scales based on 0 - 10 with 10 being the worst)  Depression: 7 Anxiety: 6.5       Assisted patient in processing above session content; acknowledged and normalized patient’s thoughts, feelings, and concerns.  Rationalized patient thought process regarding the Thanksgiving holiday and current situations that she is dealing with.  Discussed triggers associated with patient's feelings of anxiety and depression such as dealing with feelings of regret from her past and thoughts of things that she would like to change.   Also discussed coping skills for patient to implement such as working on forgiving herself for things she is not proud of in the past, working with her boyfriend to create a plan for him to start learning to drive, and creating personal goals.    Allowed patient to freely discuss issues without interruption or judgment. Provided safe, confidential environment to facilitate the development of positive  therapeutic relationship and encourage open, honest communication. Assisted patient in identifying risk factors which would indicate the need for higher level of care including thoughts to harm self or others and/or self-harming behavior and encouraged patient to contact this office, call 911, or present to the nearest emergency room should any of these events occur. Discussed crisis intervention services and means to access. Patient adamantly and convincingly denies current suicidal or homicidal ideation or perceptual disturbance.    Assessment:   Assessment   Patient appears to maintain relative stability as compared to their baseline.  However, patient continues to struggle with anxiety and depression which continues to cause impairment in important areas of functioning.  A result, they can be reasonably expected to continue to benefit from treatment and would likely be at increased risk for decompensation otherwise.    Mental Status Exam:   Hygiene:   good  Cooperation:  Cooperative  Eye Contact:  Good  Psychomotor Behavior:  Appropriate  Affect:  Appropriate  Mood: normal  Speech:  Normal  Thought Process:  Goal directed  Thought Content:  Normal  Suicidal:  None  Homicidal:  None  Hallucinations:  None  Delusion:  None  Memory:  Intact  Orientation:  Person, Place, Time, and Situation  Reliability:  good  Insight:  Good  Judgement:  Good  Impulse Control:  Good  Physical/Medical Issues:  No        Patient's Support Network Includes:  significant other and extended family    Functional Status: Moderate impairment     Progress toward goal: Not at goal    Prognosis: Good with Ongoing Treatment          Plan:    Patient will continue in individual outpatient therapy with focus on improved functioning and coping skills, maintaining stability, and avoiding decompensation and the need for higher level of care.    Patient will adhere to medication regimen as prescribed and report any side effects. Patient will contact  this office, call 911 or present to the nearest emergency room should suicidal or homicidal ideations occur. Provide Cognitive Behavioral Therapy and Solution Focused Therapy to improve functioning, maintain stability, and avoid decompensation and the need for higher level of care.     Return in about 2 weeks, or earlier if symptoms worsen or fail to improve.           VISIT DIAGNOSIS:     ICD-10-CM ICD-9-CM   1. Generalized anxiety disorder  F41.1 300.02   2. Moderate episode of recurrent major depressive disorder  F33.1 296.32             This document has been electronically signed by JP Toussaint  January 4, 2024 20:50 EST      Part of this note may be an electronic transcription/translation of spoken language to printed text using the Dragon Dictation System.

## 2023-12-04 ENCOUNTER — TELEMEDICINE (OUTPATIENT)
Dept: PSYCHIATRY | Facility: CLINIC | Age: 28
End: 2023-12-04
Payer: COMMERCIAL

## 2023-12-04 DIAGNOSIS — F41.9 ANXIETY DISORDER, UNSPECIFIED TYPE: Chronic | ICD-10-CM

## 2023-12-04 DIAGNOSIS — F51.5 NIGHTMARES: ICD-10-CM

## 2023-12-04 DIAGNOSIS — F33.1 MAJOR DEPRESSIVE DISORDER, RECURRENT EPISODE, MODERATE: Primary | Chronic | ICD-10-CM

## 2023-12-04 PROCEDURE — 1160F RVW MEDS BY RX/DR IN RCRD: CPT | Performed by: NURSE PRACTITIONER

## 2023-12-04 PROCEDURE — 99214 OFFICE O/P EST MOD 30 MIN: CPT | Performed by: NURSE PRACTITIONER

## 2023-12-04 PROCEDURE — 1159F MED LIST DOCD IN RCRD: CPT | Performed by: NURSE PRACTITIONER

## 2023-12-04 RX ORDER — VORTIOXETINE 20 MG/1
20 TABLET, FILM COATED ORAL
Qty: 30 TABLET | Refills: 0 | Status: SHIPPED | OUTPATIENT
Start: 2023-12-04

## 2023-12-04 RX ORDER — LAMOTRIGINE 100 MG/1
100 TABLET ORAL DAILY
Qty: 30 TABLET | Refills: 0 | Status: SHIPPED | OUTPATIENT
Start: 2023-12-04

## 2023-12-04 RX ORDER — PRAZOSIN HYDROCHLORIDE 1 MG/1
3 CAPSULE ORAL NIGHTLY
Qty: 90 CAPSULE | Refills: 0 | Status: SHIPPED | OUTPATIENT
Start: 2023-12-04

## 2023-12-04 RX ORDER — PRAZOSIN HYDROCHLORIDE 1 MG/1
1 CAPSULE ORAL NIGHTLY
Qty: 30 CAPSULE | OUTPATIENT
Start: 2023-12-04

## 2023-12-04 NOTE — PROGRESS NOTES
"This provider is located at the [facility] (through [facility]), [address], [facility], [address] using a secure SPD Control Systemshart Video Visit through [employer]. Patient is being seen remotely via telehealth at their home address in [place], and stated they are in a secure environment for this session. The patient's condition being diagnosed/treated is appropriate for telemedicine. The provider identified herself as well as her credentials.   The patient, and/or patients guardian, consent to be seen remotely, and when consent is given they understand that the consent allows for patient identifiable information to be sent to a third party as needed.   They may refuse to be seen remotely at any time. The electronic data is encrypted and password protected, and the patient and/or guardian has been advised of the potential risks to privacy not withstanding such measures.    You have chosen to receive care through a telehealth visit.  Do you consent to use a video/audio connection for your medical care today? Yes    Patient identifiers utilized: Name and date of birth.    Patient verbally confirmed consent for today's encounter  12/04/2023 .    The patient does verbally confirm that they are being seen today while in the [facility].  The  provider/this APRN is licensed in the [facility] where the patient is located/being seen.    Subjective   Maddy Chisholm is a 28 y.o. female who presents today for follow up    Chief Complaint: Medication management follow-up - Mood/depression, anxiety, sleeping difficulties, and nightmares follow-up    Accompanied by: The patient is alone at today's encounter    History of Present Illness:   The patient describes her mood as \"okay and can't complain\" since her last encounter with this APRN.  The patient reports work has been going good.  She reports her supervisor recently turned in their 2-week notice, so she is not sure who " her new supervisor is going to be as of yet the patient reports she has been meeting with her therapist regularly, and reports therapy has been going good.  The patient rates her symptoms of depression at a 6.5/10 on a 0-10 scale, with 10 being the worst.  The patient rates her symptoms of anxiety at a 6.5/10 on a 0-10 scale, with 10 being the worst.  The patient reports her appetite as good.  The patient reports her sleep as good.  The patient does not report any recent nightmares.  The patient denies any new medical problems or changes in medications since last appointment with this facility.  The patient reports compliance with her current medication regimen.  The patient denies any side effects or concerns from her current medication regimen.  The patient denies any auditory hallucinations or visual hallucinations.  The patient does not report any reckless or impulsive behaviors.  The patient does not endorse any significant symptoms consistent with oskar or psychosis at today's encounter.  The patient denies any self-injurious behaviors.  The patient reports she sometimes has thoughts that are unwanted, only occasionally, that she would be better off not here, but she reports these thoughts are intrusive, they are fleeting, and they are unwanted.  The patient reports she does not want to die or kill herself.  The patient is able to list protective factors against suicide.  The patient denies any suicidal or homicidal ideations, plans, or intent at today's encounter and is convincing.  The patient reports she would like to not adjust or change her current treatment regimen at today's encounter.  The patient does verbally contract for safety at today's encounter and is in verbal agreement with the safety/crisis plan. The patient reports in their own words that they will reach out to this APRN/office prior to next scheduled appointment if there is any worsening of mood, any new psychiatric symptoms, any medication  side effects or concerns, any concern for safety to self or others, any suicidal or homicidal ideations plans or intent, or any concerns, or they will call 911, call or text the suicide and crisis lifeline at [phone], or go to the closest emergency department.        PHQ-9 Depression Screening  Little interest or pleasure in doing things? (P) 0-->not at all   Feeling down, depressed, or hopeless? (P) 2-->more than half the days   Trouble falling or staying asleep, or sleeping too much? (P) 2-->more than half the days   Feeling tired or having little energy? (P) 1-->several days   Poor appetite or overeating? (P) 2-->more than half the days   Feeling bad about yourself - or that you are a failure or have let yourself or your family down? (P) 1-->several days   Trouble concentrating on things, such as reading the newspaper or watching television? (P) 1-->several days   Moving or speaking so slowly that other people could have noticed? Or the opposite - being so fidgety or restless that you have been moving around a lot more than usual? (P) 0-->not at all   Thoughts that you would be better off dead, or of hurting yourself in some way? (P) 1-->several days   PHQ-9 Total Score (P) 10   If you checked off any problems, how difficult have these problems made it for you to do your work, take care of things at home, or get along with other people? (P) somewhat difficult     PHQ-9 Total Score: (P) 10      JAIRO-7  Feeling nervous, anxious or on edge: (P) Not at all  Not being able to stop or control worrying: (P) Several days  Worrying too much about different things: (P) More than half the days  Trouble Relaxing: (P) Not at all  Being so restless that it is hard to sit still: (P) Not at all  Feeling afraid as if something awful might happen: (P) Not at all  Becoming easily annoyed or irritable: (P) Several days  JAIRO 7 Total Score: (P) 4  If you checked any problems, how difficult have these problems made it for you to do your  work, take care of things at home, or get along with other people: (P) Somewhat difficult      All Known Prior Psychiatric Medications:  -Lexapro - decreased her libido so she stopped taking it (can't remember if it helped her mood or not)  -Prozac  -Wellbutrin  -Hydroxyzine  -Lamictal  -Prazosin  -Trintellix      Last Menstrual Period:  Less than one month ago.  The patient denies chance of pregnancy.  The patient was educated that her prescribed medications can have potential risk to a developing fetus. The patient is advised to contact this APRN/this office if she becomes pregnant or plans to become pregnant.  Pt verbalizes understanding and acknowledged agreement with this plan in her own words.        The following portions of the patient's history were reviewed and updated as appropriate: allergies, current medications, past family history, past medical history, past social history, past surgical history and problem list.            Past Medical History:  Past Medical History:   Diagnosis Date    Anemia     Anxiety     COVID 08/2022    Depression     Vitamin B12 deficiency        Social History:  Social History     Socioeconomic History    Marital status: Single    Number of children: 0   Tobacco Use    Smoking status: Former     Packs/day: 0.20     Years: 0.25     Additional pack years: 0.00     Total pack years: 0.05     Types: Cigarettes     Start date: 5/1/2020     Quit date: 8/15/2020     Years since quitting: 3.3    Smokeless tobacco: Never    Tobacco comments:     smoked for 5 years  (black and milds for 5 years then restarted 5/2020 and quit 8/2020   Vaping Use    Vaping Use: Never used   Substance and Sexual Activity    Alcohol use: Yes     Comment: Patient reports 1-2 times weekly use    Drug use: Not Currently     Comment: The patient states in her past she has used marijuanna, but not currently    Sexual activity: Yes     Partners: Male     Birth control/protection: OCP       Family  History:  Family History   Adopted: Yes       Past Surgical History:  Past Surgical History:   Procedure Laterality Date    NO PAST SURGERIES         Problem List:  Patient Active Problem List   Diagnosis    Iron deficiency anemia secondary to inadequate dietary iron intake    B12 deficiency    Anxiety    BV (bacterial vaginosis)    Allergic rhinitis    Vitiligo    Generalized anxiety disorder    Moderate episode of recurrent major depressive disorder    Restless leg    Excessive sweating       Allergy:   No Known Allergies     Current Medications:   Current Outpatient Medications   Medication Sig Dispense Refill    lamoTRIgine (LaMICtal) 100 MG tablet Take 1 tablet by mouth Daily. 30 tablet 0    prazosin (MINIPRESS) 1 MG capsule Take 3 capsules by mouth Every Night. 90 capsule 0    Vortioxetine HBr (Trintellix) 20 MG tablet Take 1 tablet by mouth Daily With Breakfast. 30 tablet 0    drospirenone-ethinyl estradiol (Machelle) 3-0.03 MG per tablet Take 1 tablet by mouth Daily. 84 tablet 3    ferrous gluconate (FERGON) 324 MG tablet Take 1 tablet by mouth Daily With Breakfast. 30 tablet 0    fluticasone (FLONASE) 50 MCG/ACT nasal spray 2 sprays by Each Nare route Daily. 16 g 3     No current facility-administered medications for this visit.           Review of Symptoms:    Review of Systems   Psychiatric/Behavioral:  Positive for decreased concentration, depressed mood and stress. Negative for agitation, behavioral problems, dysphoric mood, hallucinations, self-injury, suicidal ideas and negative for hyperactivity. Sleep disturbance: improved with medication.The patient is nervous/anxious.          Physical Exam:   not currently breastfeeding. There is no height or weight on file to calculate BMI.   Due to the remote nature of this encounter (virtual encounter), vitals were unable to be obtained.  Height stated at 66.5 inches.  Weight stated around 182 pounds.      Physical Exam  Neurological:      Mental Status: She is  alert and oriented to person, place, and time.   Psychiatric:         Attention and Perception: Attention normal.         Mood and Affect: Mood and affect normal.         Speech: Speech normal.         Behavior: Behavior normal. Behavior is cooperative.         Thought Content: Thought content normal. Thought content is not paranoid or delusional. Thought content does not include homicidal or suicidal ideation. Thought content does not include homicidal or suicidal plan.         Cognition and Memory: Cognition and memory normal.         Judgment: Judgment normal.         Mental Status Exam:   Hygiene:   good  Cooperation:  Cooperative and attentive  Eye Contact:  Good  Psychomotor Behavior:  Appropriate  Affect:  Appropriate  Mood: normal  Hopelessness: Denies  Speech:  Normal  Thought Process:  Linear  Thought Content:  Mood congruent  Suicidal:  None  Homicidal:  None  Hallucinations:  None  Delusion:  None  Memory:  Intact  Orientation:  Person, Place, Time and Situation  Reliability:  good  Insight:  Good  Judgement:  Good  Impulse Control:  Good  Physical/Medical Issues:  No            Lab Results:   No visits with results within 1 Month(s) from this visit.   Latest known visit with results is:   Lab on 05/09/2023   Component Date Value Ref Range Status    Vitamin B-12 05/09/2023 608  211 - 946 pg/mL Final    Iron 05/09/2023 41  37 - 145 mcg/dL Final    Iron Saturation (TSAT) 05/09/2023 7 (L)  20 - 50 % Final    Transferrin 05/09/2023 401 (H)  200 - 360 mg/dL Final    TIBC 05/09/2023 597 (H)  298 - 536 mcg/dL Final    Ferritin 05/09/2023 35.20  13.00 - 150.00 ng/mL Final    WBC 05/09/2023 7.08  3.40 - 10.80 10*3/mm3 Final    RBC 05/09/2023 5.01  3.77 - 5.28 10*6/mm3 Final    Hemoglobin 05/09/2023 11.7 (L)  12.0 - 15.9 g/dL Final    Hematocrit 05/09/2023 36.7  34.0 - 46.6 % Final    MCV 05/09/2023 73.3 (L)  79.0 - 97.0 fL Final    MCH 05/09/2023 23.4 (L)  26.6 - 33.0 pg Final    MCHC 05/09/2023 31.9  31.5 - 35.7  g/dL Final    RDW 05/09/2023 14.6  12.3 - 15.4 % Final    RDW-SD 05/09/2023 38.4  37.0 - 54.0 fl Final    MPV 05/09/2023 10.2  6.0 - 12.0 fL Final    Platelets 05/09/2023 244  140 - 450 10*3/mm3 Final    Glucose 05/09/2023 102 (H)  65 - 99 mg/dL Final    BUN 05/09/2023 10  6 - 20 mg/dL Final    Creatinine 05/09/2023 0.85  0.57 - 1.00 mg/dL Final    Sodium 05/09/2023 137  136 - 145 mmol/L Final    Potassium 05/09/2023 4.2  3.5 - 5.2 mmol/L Final    Chloride 05/09/2023 99  98 - 107 mmol/L Final    CO2 05/09/2023 24.2  22.0 - 29.0 mmol/L Final    Calcium 05/09/2023 9.7  8.6 - 10.5 mg/dL Final    Total Protein 05/09/2023 7.2  6.0 - 8.5 g/dL Final    Albumin 05/09/2023 4.2  3.5 - 5.2 g/dL Final    ALT (SGPT) 05/09/2023 11  1 - 33 U/L Final    AST (SGOT) 05/09/2023 17  1 - 32 U/L Final    Alkaline Phosphatase 05/09/2023 82  39 - 117 U/L Final    Total Bilirubin 05/09/2023 <0.2  0.0 - 1.2 mg/dL Final    Globulin 05/09/2023 3.0  gm/dL Final    A/G Ratio 05/09/2023 1.4  g/dL Final    BUN/Creatinine Ratio 05/09/2023 11.8  7.0 - 25.0 Final    Anion Gap 05/09/2023 13.8  5.0 - 15.0 mmol/L Final    eGFR 05/09/2023 95.8  >60.0 mL/min/1.73 Final    Total Cholesterol 05/09/2023 236 (H)  0 - 200 mg/dL Final    Triglycerides 05/09/2023 191 (H)  0 - 150 mg/dL Final    HDL Cholesterol 05/09/2023 101 (H)  40 - 60 mg/dL Final    LDL Cholesterol  05/09/2023 103 (H)  0 - 100 mg/dL Final    VLDL Cholesterol 05/09/2023 32  5 - 40 mg/dL Final    LDL/HDL Ratio 05/09/2023 0.96   Final         Assessment & Plan   Problems Addressed this Visit    None  Visit Diagnoses       Major depressive disorder, recurrent episode, moderate  (Chronic)   -  Primary    R/O BPD R/O BPAD    Relevant Medications    Vortioxetine HBr (Trintellix) 20 MG tablet    lamoTRIgine (LaMICtal) 100 MG tablet    Anxiety disorder, unspecified type  (Chronic)       Relevant Medications    Vortioxetine HBr (Trintellix) 20 MG tablet    Nightmares        Relevant Medications     Vortioxetine HBr (Trintellix) 20 MG tablet    prazosin (MINIPRESS) 1 MG capsule          Diagnoses         Codes Comments    Major depressive disorder, recurrent episode, moderate    -  Primary ICD-10-CM: F33.1  ICD-9-CM: 296.32 R/O BPD R/O BPAD    Anxiety disorder, unspecified type     ICD-10-CM: F41.9  ICD-9-CM: 300.00     Nightmares     ICD-10-CM: F51.5  ICD-9-CM: 307.47             Visit Diagnoses:    ICD-10-CM ICD-9-CM   1. Major depressive disorder, recurrent episode, moderate  F33.1 296.32   2. Anxiety disorder, unspecified type  F41.9 300.00   3. Nightmares  F51.5 307.47                GOALS:  Short Term Goals: Patient will be compliant with medication, and patient will have no significant medication related side effects.  Patient will be engaged in psychotherapy as indicated.  Patient will report subjective improvement of symptoms.  Long term goals: To stabilize mood and treat/improve subjective symptoms, the patient will stay out of the hospital, the patient will be at an optimal level of functioning, and the patient will take all medications as prescribed.  The patient verbalized understanding and agreement with goals that were mutually set.      TREATMENT PLAN: Take medications as indicated.  Continue psychotherapy.  Medication and treatment options, both pharmacological and non-pharmacological treatment options, discussed during today's visit, including any off label use of medication. Patient acknowledged and verbally consented with current treatment plan and was educated on the importance of compliance with treatment and follow-up appointments.      -Continue Trintellix 20 mg by mouth once daily in the morning with breakfast for moods.  -Continue lamotrigine 100 mg by mouth once daily for mood.  -Continue prazosin 3 mg by mouth once nightly at bedtime for nightmares.      MEDICATION ISSUES:  Discussed medication options and treatment plan of prescribed medication, any off label use of medication, as  well as the risks, benefits, any black box warnings including increased suicidality, and side effects including but not limited to potential falls, dizziness, possible impaired driving, GI side effects (change in appetite, abdominal discomfort, nausea, vomiting, diarrhea, and/or constipation), dry mouth, somnolence, sedation, insomnia, activation, agitation, irritation, tremors, abnormal muscle movements or disorders, headache, sweating, possible bruising or rare bleeding, electrolyte and/or fluid abnormalities, change in blood pressure/heart rate/and or heart rhythm, sexual dysfunction, and metabolic adversities among others. Patient and/or guardian agreeable to call the office with any worsening of symptoms or onset of side effects, or if any concerns or questions arise.  The contact information for the office is made available to the patient and/or guardian.  Patient and/or guardian agreeable to call 911 or go to the nearest ER should they begin having any SI/HI, or if any urgent concerns arise. No medication side effects or related complaints today.    This APRN has discussed the benefits and risks of taking/continuing Lamictal (Lamotrigine).  The side effects of Lamictal can include a benign rash, blurred or double vision, dizziness, ataxia, sedation, headache, tremor, insomnia, poor coordination, fatigue,  nausea, vomiting, dyspepsia, rhinitis, infection, pharyngitis, asthenia, a rare but serious rash, rare multi-organ failure associated with Santana-Jean Syndrome, toxic epidermal necrolysis, drug hypersensitivity syndrome, rare blood dyscrasias, rare aseptic meningitis, rare sudden unexplained deaths in people with epilepsy, withdrawal seizures upon abrupt withdrawal, and rare activation of suicidal ideation and behavior (suicidality).  This APRN has discussed that a very slow dose titration when starting, or changing doses, of Lamictal may reduce the incidence of skin rash and other side effects.  The  dosage should not be titrated upwards or increased faster than recommended due to the possibility of the discussed side effects and risk of development of a skin rash (which can become life threatening).    This APRN has also discussed that if the patient stops taking the Lamictal for 3-5 days or longer, it will be necessary to restart the drug with an initial dose titration, as rashes have been reported on reexposure.  If the patient and Provider decide to stop the Lamictal, the patient will follow the directions of this APRN/this office as a guided taper over about two weeks is appropriate due to the risk of relapse in bipolar disorder with those with a mood or bipolar disorder, the risk of seizures in those with epilepsy, and discontinuation symptoms upon rapid discontinuation of Lamictal.    The patient verbalizes understanding of benefits and risks as discussed, the patient/guardian feels the benefits outweigh the risks and is agreeable to continue/take Lamictal as discussed.  The patient is advised should any side effects or rash develops they are to stop the Lamictal immediately and contact this APRN/this office or go to the emergency department immediately.  The patient verbalizes understanding and agreement with treatment plan in their own words.      VERBAL INFORMED CONSENT FOR MEDICATION:  The patient was educated that their proposed/prescribed psychotropic medication(s) has potential risks, side effects, adverse effects, and black box warnings; and these have been discussed with the patient.  The patient has been informed that their treatment and medication dosage is to be individualized, and may even be above or below the recommended range/dosage due to patient individualization and response, but medication is prescribed using a shared decision making approach, and no medication or dosage will be prescribed without the patient's verbal consent.  The reason for the use of the medication including any off  label use and alternative modes of treatment other than or in addition to medication has been considered and discussed, the probable consequences of not receiving the proposed treatment have been discussed, and any treatment side effects, black box warnings, and cautions associated with treatment have been discussed with the patient.  The patient is allowed ample time to openly discuss and ask questions regarding the proposed medication(s) and treatment plan and the patient verbalizes understanding the reasons for the use of the medication, its potential risks and benefits, other alternative treatment(s), and the probable consequences that may occur if the proposed medication is not given.  The patient has been given ample time to ask questions and study the information and find the information to be specific, accurate, and complete.  The patient gives verbal consent for the medication(s) proposed/prescribed, they verbalized understanding that they can refuse and withdraw consent at any time with the assistance of this APRN, and the patient has verbally confirmed that they are aware, and are willing, to take the prescribed medication and follow the treatment plan with the known possible risks, side effect, black box warnings, and any potential medication interactions, and the patient reports they will be worse off without this medication and treatment plan.  The patient is advised to contact this APRN/this office if any questions or concerns arise at any time (at [phone]), or call 911/go to the closest emergency department if needed or outside of office hours.      SUICIDE RISK ASSESSMENT AND SAFETY PLAN: Unalterable demographics and a history of mental health intervention indicate this patient is in a high risk category compared to the general population. At present, the patient denies active SI/HI, intentions, or plans at this time and agrees to seek immediate care should such thoughts develop. The patient  verbalizes understanding of how to access emergency care if needed and agrees to do so. Consideration of suicide risk and protective factors such as history, current presentation, individual strengths and weaknesses, psychosocial and environmental stressors and variables, psychiatric illness and symptoms, medical conditions and pain, took place in this interview. Based on those considerations, the patient is determined: within individual baseline and presenting no imminent risk for suicide or homicide. Other recommendations: The patient does not meet the criteria for inpatient admission and is not a safety risk to self or others at today's visit. Inpatient treatment offers no significant advantages over outpatient treatment for this patient at today's visit.  The patient was given ample time for questions and fully participated in treatment planning.  The patient was encouraged to call the clinic with any questions or concerns.  The patient was informed of access to emergency care. If patient were to develop any significant symptomatology, suicidal ideation, homicidal ideation, any concerns, or feel unsafe at any time they are to call the clinic and if unable to get immediate assistance should immediately call 911 or go to the nearest emergency room.  Patient contracted verbally for the following: If you are experiencing an emotional crisis or have thoughts of harming yourself or others, please go to your nearest local emergency room or call 911. Will continue to re-assess medication response and side effects frequently to establish efficacy and ensure safety. Risks, any black box warnings, side effects, off label usage, and benefits of medication and treatment discussed with patient, along with potential adverse side effects of current and/or newly prescribed medication, alternative treatment options, and OTC medications.  Patient verbalized understanding of potential risks, any off label use of medication, any  black box warnings, and any side effects in their own words. The patient verbalized understanding and agreed to comply with the safety plan discussed in their own words.  Patient given the number to the office. Number also discussed of the 24- hour suicide hotline.           MEDS ORDERED DURING VISIT:  New Medications Ordered This Visit   Medications    Vortioxetine HBr (Trintellix) 20 MG tablet     Sig: Take 1 tablet by mouth Daily With Breakfast.     Dispense:  30 tablet     Refill:  0    prazosin (MINIPRESS) 1 MG capsule     Sig: Take 3 capsules by mouth Every Night.     Dispense:  90 capsule     Refill:  0    lamoTRIgine (LaMICtal) 100 MG tablet     Sig: Take 1 tablet by mouth Daily.     Dispense:  30 tablet     Refill:  0       Return in about 4 weeks (around 1/1/2024), or if symptoms worsen or fail to improve, for Next scheduled follow up and Recheck.       Progress: Not at goal    Functional Status: Mild impairment     Prognosis: Good with Ongoing Treatment              This document has been electronically signed by ARVIN Tamez  December 4, 2023 11:24 EST    Some of the data in this electronic note has been brought forward from a previous encounter, any necessary changes have been made, it has been reviewed by this APRN, and it is accurate.    Please note that portions of this note were completed with a voice recognition program.

## 2023-12-07 ENCOUNTER — TELEMEDICINE (OUTPATIENT)
Dept: PSYCHIATRY | Facility: CLINIC | Age: 28
End: 2023-12-07
Payer: COMMERCIAL

## 2023-12-07 DIAGNOSIS — F33.1 MODERATE EPISODE OF RECURRENT MAJOR DEPRESSIVE DISORDER: ICD-10-CM

## 2023-12-07 DIAGNOSIS — F41.1 GENERALIZED ANXIETY DISORDER: Primary | ICD-10-CM

## 2024-01-02 ENCOUNTER — TELEMEDICINE (OUTPATIENT)
Dept: PSYCHIATRY | Facility: CLINIC | Age: 29
End: 2024-01-02
Payer: COMMERCIAL

## 2024-01-02 DIAGNOSIS — F33.1 MODERATE EPISODE OF RECURRENT MAJOR DEPRESSIVE DISORDER: ICD-10-CM

## 2024-01-02 DIAGNOSIS — F41.1 GENERALIZED ANXIETY DISORDER: Primary | ICD-10-CM

## 2024-01-02 PROCEDURE — 90837 PSYTX W PT 60 MINUTES: CPT | Performed by: COUNSELOR

## 2024-01-02 NOTE — PROGRESS NOTES
Date: January 2, 2024  Time In: 9:25 AM   Time Out: 10:26 AM   This provider is located at the [facility]), [address], [place] [address] using a secure [employer] Video Visit through [employer]. Patient is being seen remotely via telehealth at home address in [place] and stated they are in a secure environment for this session. The patient's condition being diagnosed/treated is appropriate for telemedicine. The provider identified herself as well as her credentials. The patient, and/or patients guardian, consent to be seen remotely, and when consent is given they understand that the consent allows for patient identifiable information to be sent to a third party as needed. They may refuse to be seen remotely at any time. The electronic data is encrypted and password protected, and the patient and/or guardian has been advised of the potential risks to privacy not withstanding such measures.     You have chosen to receive care through a telehealth visit.  Do you consent to use a video/audio connection for your medical care today? Yes    PROGRESS NOTE  Data:  Maddy Chisholm is a 28 y.o. female who presents today for follow up. Patient states that she had a good holiday season. Patient states that her birthday is coming up at the end of the month and her parents had told her that they would be coming in for her birthday but then those plans have changed due to unforseen circumstances. Patient states that she feels that her parents will try to come in the more summer months when her mother is feeling better as the patient states that her mother struggles in the winter months. Patient states that her boyfriend was hired on part time as a step up from seasonal but this will still allow them to be able to pay the bills at their new apartment. Patient states that she is still worried about her cat as his condition is still bothersome and he continues to  "scratch and itch due to fleas; patient states that she and her boyfriend still haven't put away all of their things from where they have moved and the patient states that they work and get lazy after their workday. Patient states that she feels as though she makes excuses for things and feels that this is due to her lack of comprehension. Patient states that her boyfriend gets nervous about talking to people but then she feels that she is patient dumb\" when she does not understand what people are saying.  Patient described how she is currently trying to get 1 insurance policy on her vehicle canceled because she accidentally ended up with two policies.  Patient states that when she tried to cancel the policies that she was given the wound by the man she was trying to cancel with and was not successful in getting the policy terminated.  Patient states that she will have to call again and is going to try to get her boyfriend to make the call.  Discussed being firm when she is doing business with others and not allowing them to to persuade her to do things that she does not want to do.  Discussed telephone etiquette with the patient in regard to asking a person she is talking to to slow down and repeating back to them what she understood them to have said to make sure that she heard them correctly and understood what needs to be done.  Discussed the patient getting overwhelmed with past and how choosing a specific time frame and project to work on during that timeframe is sometimes easier to manage and trying to take on several different projects at once.  Discussed the amount of time it takes to complete random has and how much time they really take for her to do.  Discussed the patient not letting herself get overwhelmed or bombarded with things but instead taking things one step at a time such as putting the feeling medicine on her pet cat and working the date that she gave the medicine on her calendar so that she " can remember it for the next month.  Discussed giving herself praise and not allowing others to make her feel that she is.  The patient that she had stated previously that she had not been told that everything she needed to have been taught in order to feel more like an adult and be able to handle matters on her own.  Discussed the patient looking to become more independent from her boyfriend by building her self confidence through asking about things that she needs to or wants to learn.    Chief Complaint:feelings of anxiety and depression, lack of motivation and lack of understanding    History of Present Illness: patient has struggled with anxiety and depression for several years      Clinical Maneuvering/Intervention:solution focused    (Scales based on 0 - 10 with 10 being the worst)  Depression: 5.5 Anxiety: 6       Assisted patient in processing above session content; acknowledged and normalized patient’s thoughts, feelings, and concerns.  Rationalized patient thought process regarding not getting things done and not feeling confident in herself to do things she needs to.  Discussed triggers associated with patient's feelings of anxiety and depression such as continuing to procrastinate about getting certain things taken care of, fear of not understanding what she needs to do after someone tells her and being somewhat dependent upon her boyfriend.  Also discussed coping skills for patient to implement such as using calendars and reminders on the phone to help her remember things, asking for help with things that she doesn't understand and not putting herself down for things that she hasn't learned yet.     Allowed patient to freely discuss issues without interruption or judgment. Provided safe, confidential environment to facilitate the development of positive therapeutic relationship and encourage open, honest communication. Assisted patient in identifying risk factors which would indicate the need for  higher level of care including thoughts to harm self or others and/or self-harming behavior and encouraged patient to contact this office, call 911, or present to the nearest emergency room should any of these events occur. Discussed crisis intervention services and means to access. Patient adamantly and convincingly denies current suicidal or homicidal ideation or perceptual disturbance.    Assessment:   Assessment   Patient appears to maintain relative stability as compared to their baseline.  However, patient continues to struggle with depression and anxiety which continues to cause impairment in important areas of functioning.  A result, they can be reasonably expected to continue to benefit from treatment and would likely be at increased risk for decompensation otherwise.    Mental Status Exam:   Hygiene:   good  Cooperation:  Cooperative  Eye Contact:  Good  Psychomotor Behavior:  Appropriate  Affect:  Appropriate  Mood: normal  Speech:  Normal  Thought Process:  Goal directed  Thought Content:  Normal  Suicidal:  None  Homicidal:  None  Hallucinations:  None  Delusion:  None  Memory:  Intact  Orientation:  Person, Place, Time, and Situation  Reliability:  good  Insight:  Good  Judgement:  Good  Impulse Control:  Good  Physical/Medical Issues:  No        Patient's Support Network Includes:  significant other and extended family    Functional Status: Moderate impairment     Progress toward goal: Not at goal    Prognosis: Good with Ongoing Treatment          Plan:    Patient will continue in individual outpatient therapy with focus on improved functioning and coping skills, maintaining stability, and avoiding decompensation and the need for higher level of care.    Patient will adhere to medication regimen as prescribed and report any side effects. Patient will contact this office, call 911 or present to the nearest emergency room should suicidal or homicidal ideations occur. Provide Cognitive Behavioral Therapy  and Solution Focused Therapy to improve functioning, maintain stability, and avoid decompensation and the need for higher level of care.     Return in about 2 weeks, or earlier if symptoms worsen or fail to improve.           VISIT DIAGNOSIS:     ICD-10-CM ICD-9-CM   1. Generalized anxiety disorder  F41.1 300.02   2. Moderate episode of recurrent major depressive disorder  F33.1 296.32             This document has been electronically signed by PJ Toussaint  January 2, 2024 16:01 EST      Part of this note may be an electronic transcription/translation of spoken language to printed text using the Dragon Dictation System.

## 2024-01-03 ENCOUNTER — TELEMEDICINE (OUTPATIENT)
Dept: PSYCHIATRY | Facility: CLINIC | Age: 29
End: 2024-01-03
Payer: COMMERCIAL

## 2024-01-03 DIAGNOSIS — F41.9 ANXIETY DISORDER, UNSPECIFIED TYPE: Chronic | ICD-10-CM

## 2024-01-03 DIAGNOSIS — F51.5 NIGHTMARES: ICD-10-CM

## 2024-01-03 DIAGNOSIS — F33.1 MAJOR DEPRESSIVE DISORDER, RECURRENT EPISODE, MODERATE: Primary | Chronic | ICD-10-CM

## 2024-01-03 RX ORDER — VORTIOXETINE 20 MG/1
20 TABLET, FILM COATED ORAL
Qty: 30 TABLET | Refills: 0 | Status: SHIPPED | OUTPATIENT
Start: 2024-01-03

## 2024-01-03 RX ORDER — LAMOTRIGINE 100 MG/1
100 TABLET ORAL DAILY
Qty: 30 TABLET | Refills: 0 | Status: SHIPPED | OUTPATIENT
Start: 2024-01-03

## 2024-01-03 RX ORDER — PRAZOSIN HYDROCHLORIDE 1 MG/1
3 CAPSULE ORAL NIGHTLY
Qty: 90 CAPSULE | Refills: 0 | Status: SHIPPED | OUTPATIENT
Start: 2024-01-03

## 2024-01-03 NOTE — PROGRESS NOTES
"This provider is located at the [facility] (through [facility]), [address], [place], [address] using a secure [employer] Video Visit through [employer]. Patient is being seen remotely via telehealth at their home address in [place], and stated they are in a secure environment for this session. The patient's condition being diagnosed/treated is appropriate for telemedicine. The provider identified herself as well as her credentials.   The patient, and/or patients guardian, consent to be seen remotely, and when consent is given they understand that the consent allows for patient identifiable information to be sent to a third party as needed.   They may refuse to be seen remotely at any time. The electronic data is encrypted and password protected, and the patient and/or guardian has been advised of the potential risks to privacy not withstanding such measures.    You have chosen to receive care through a telehealth visit.  Do you consent to use a video/audio connection for your medical care today? Yes    Patient identifiers utilized: Name and date of birth.    Patient verbally confirmed consent for today's encounter  01/03/2024 .    The patient does verbally confirm that they are being seen today while in the [facility].  The  provider/this APRN is licensed in the [facility] where the patient is located/being seen.    Subjective   Maddy Chisholm is a 28 y.o. female who presents today for follow up    Chief Complaint: Medication management follow-up - Mood/depression, anxiety, sleeping difficulties, and nightmares follow-up    Accompanied by: The patient is alone at today's encounter    History of Present Illness:   The patient describes her mood as \"pretty okay\" since her last encounter with this APRN.  The patient reports she has been attending therapy, and they are working on her coping and managing with some of the stressors in her life and therapy " at this time.  The patient rates her symptoms of depression at a 5.5/10 on a 0-10 scale, with 10 being the worst.  The patient rates her symptoms of anxiety at a 6.5/10 on a 0-10 scale, with 10 being the worst.  The patient reports her appetite as good in general.  The patient reports her sleep as good when she does sleep, but she reports she feels she needs to work on better sleep hygiene.  The patient reports she still has occasional nightmares, but not as frequent or severe when compared to prior to taking prazosin.  The patient denies any new medical problems or changes in medications since last appointment with this facility.  The patient reports compliance with her current medication regimen.  The patient denies any side effects or concerns from her current medication regimen.  The patient denies any auditory hallucinations or visual hallucinations.  The patient does not report any reckless, impulsive, or risky behaviors.  The patient does not endorse any significant symptoms consistent with oskar or psychosis at today's encounter.  The patient denies any self-injurious behaviors.  The patient denies any suicidal or homicidal ideations, plans, or intent at today's encounter and is convincing.  The patient reports she would like to not adjust or change her current treatment regimen at today's encounter.  The patient does verbally contract for safety at today's encounter and is in verbal agreement with the safety/crisis plan. The patient reports in their own words that they will reach out to this APRN/office prior to next scheduled appointment if there is any worsening of mood, any new psychiatric symptoms, any medication side effects or concerns, any concern for safety to self or others, any suicidal or homicidal ideations plans or intent, or any concerns, or they will call 911, call or text the suicide and crisis lifeline at [phone], or go to the closest emergency department.      PHQ-9 Depression  Screening  Little interest or pleasure in doing things? (P) 1-->several days   Feeling down, depressed, or hopeless? (P) 2-->more than half the days   Trouble falling or staying asleep, or sleeping too much? (P) 1-->several days   Feeling tired or having little energy? (P) 1-->several days   Poor appetite or overeating? (P) 1-->several days   Feeling bad about yourself - or that you are a failure or have let yourself or your family down? (P) 1-->several days   Trouble concentrating on things, such as reading the newspaper or watching television? (P) 2-->more than half the days   Moving or speaking so slowly that other people could have noticed? Or the opposite - being so fidgety or restless that you have been moving around a lot more than usual? (P) 1-->several days   Thoughts that you would be better off dead, or of hurting yourself in some way? (P) 0-->not at all   PHQ-9 Total Score (P) 10   If you checked off any problems, how difficult have these problems made it for you to do your work, take care of things at home, or get along with other people? (P) somewhat difficult     PHQ-9 Total Score: (P) 10      JAIRO-7  Feeling nervous, anxious or on edge: (P) Several days  Not being able to stop or control worrying: (P) Several days  Worrying too much about different things: (P) Several days  Trouble Relaxing: (P) Several days  Being so restless that it is hard to sit still: (P) Several days  Feeling afraid as if something awful might happen: (P) Not at all  Becoming easily annoyed or irritable: (P) Several days  JAIRO 7 Total Score: (P) 6  If you checked any problems, how difficult have these problems made it for you to do your work, take care of things at home, or get along with other people: (P) Somewhat difficult      All Known Prior Psychiatric Medications:  -Lexapro - decreased her libido so she stopped taking it (can't remember if it helped her mood or  not)  -Prozac  -Wellbutrin  -Hydroxyzine  -Lamictal  -Prazosin  -Trintellix      Last Menstrual Period:  12/27/2023.  The patient denies chance of pregnancy.  The patient was educated that her prescribed medications can have potential risk to a developing fetus. The patient is advised to contact this APRN/this office if she becomes pregnant or plans to become pregnant.  Pt verbalizes understanding and acknowledged agreement with this plan in her own words.        The following portions of the patient's history were reviewed and updated as appropriate: allergies, current medications, past family history, past medical history, past social history, past surgical history and problem list.            Past Medical History:  Past Medical History:   Diagnosis Date    Anemia     Anxiety     COVID 08/2022    Depression     Vitamin B12 deficiency        Social History:  Social History     Socioeconomic History    Marital status: Single    Number of children: 0   Tobacco Use    Smoking status: Former     Packs/day: 0.20     Years: 0.25     Additional pack years: 0.00     Total pack years: 0.05     Types: Cigarettes     Start date: 5/1/2020     Quit date: 8/15/2020     Years since quitting: 3.3    Smokeless tobacco: Never    Tobacco comments:     smoked for 5 years  (black and milds for 5 years then restarted 5/2020 and quit 8/2020   Vaping Use    Vaping Use: Never used   Substance and Sexual Activity    Alcohol use: Yes     Comment: Patient reports 1-2 times weekly use    Drug use: Not Currently     Comment: The patient states in her past she has used marijuanna, but not currently    Sexual activity: Yes     Partners: Male     Birth control/protection: OCP       Family History:  Family History   Adopted: Yes       Past Surgical History:  Past Surgical History:   Procedure Laterality Date    NO PAST SURGERIES         Problem List:  Patient Active Problem List   Diagnosis    Iron deficiency anemia secondary to inadequate dietary  iron intake    B12 deficiency    Anxiety    BV (bacterial vaginosis)    Allergic rhinitis    Vitiligo    Generalized anxiety disorder    Moderate episode of recurrent major depressive disorder    Restless leg    Excessive sweating       Allergy:   No Known Allergies     Current Medications:   Current Outpatient Medications   Medication Sig Dispense Refill    lamoTRIgine (LaMICtal) 100 MG tablet Take 1 tablet by mouth Daily. 30 tablet 0    prazosin (MINIPRESS) 1 MG capsule Take 3 capsules by mouth Every Night. 90 capsule 0    Vortioxetine HBr (Trintellix) 20 MG tablet Take 1 tablet by mouth Daily With Breakfast. 30 tablet 0    drospirenone-ethinyl estradiol (Machelle) 3-0.03 MG per tablet Take 1 tablet by mouth Daily. 84 tablet 3    ferrous gluconate (FERGON) 324 MG tablet Take 1 tablet by mouth Daily With Breakfast. 30 tablet 0    fluticasone (FLONASE) 50 MCG/ACT nasal spray 2 sprays by Each Nare route Daily. 16 g 3     No current facility-administered medications for this visit.           Review of Symptoms:    Review of Systems   Psychiatric/Behavioral:  Positive for depressed mood and stress. Negative for agitation, behavioral problems, dysphoric mood, hallucinations, self-injury, suicidal ideas and negative for hyperactivity. Sleep disturbance: improved with medication.The patient is nervous/anxious.          Physical Exam:   not currently breastfeeding. There is no height or weight on file to calculate BMI.   Due to the remote nature of this encounter (virtual encounter), vitals were unable to be obtained.  Height stated at 66.5 inches.  Weight stated around 182 pounds.      Physical Exam  Neurological:      Mental Status: She is alert and oriented to person, place, and time.   Psychiatric:         Attention and Perception: Attention normal.         Mood and Affect: Mood and affect normal.         Speech: Speech normal.         Behavior: Behavior normal. Behavior is cooperative.         Thought Content: Thought  content normal. Thought content is not paranoid or delusional. Thought content does not include homicidal or suicidal ideation. Thought content does not include homicidal or suicidal plan.         Cognition and Memory: Cognition and memory normal.         Judgment: Judgment normal.         Mental Status Exam:   Hygiene:   good  Cooperation:  Cooperative and attentive  Eye Contact:  Good  Psychomotor Behavior:  Appropriate  Affect:  Appropriate  Mood: normal  Hopelessness: Denies  Speech:  Normal  Thought Process:  Linear  Thought Content:  Mood congruent  Suicidal:  None  Homicidal:  None  Hallucinations:  None  Delusion:  None  Memory:  Intact  Orientation:  Person, Place, Time and Situation  Reliability:  good  Insight:  Good  Judgement:  Good  Impulse Control:  Good  Physical/Medical Issues:  No            Lab Results:   No visits with results within 1 Month(s) from this visit.   Latest known visit with results is:   Lab on 05/09/2023   Component Date Value Ref Range Status    Vitamin B-12 05/09/2023 608  211 - 946 pg/mL Final    Iron 05/09/2023 41  37 - 145 mcg/dL Final    Iron Saturation (TSAT) 05/09/2023 7 (L)  20 - 50 % Final    Transferrin 05/09/2023 401 (H)  200 - 360 mg/dL Final    TIBC 05/09/2023 597 (H)  298 - 536 mcg/dL Final    Ferritin 05/09/2023 35.20  13.00 - 150.00 ng/mL Final    WBC 05/09/2023 7.08  3.40 - 10.80 10*3/mm3 Final    RBC 05/09/2023 5.01  3.77 - 5.28 10*6/mm3 Final    Hemoglobin 05/09/2023 11.7 (L)  12.0 - 15.9 g/dL Final    Hematocrit 05/09/2023 36.7  34.0 - 46.6 % Final    MCV 05/09/2023 73.3 (L)  79.0 - 97.0 fL Final    MCH 05/09/2023 23.4 (L)  26.6 - 33.0 pg Final    MCHC 05/09/2023 31.9  31.5 - 35.7 g/dL Final    RDW 05/09/2023 14.6  12.3 - 15.4 % Final    RDW-SD 05/09/2023 38.4  37.0 - 54.0 fl Final    MPV 05/09/2023 10.2  6.0 - 12.0 fL Final    Platelets 05/09/2023 244  140 - 450 10*3/mm3 Final    Glucose 05/09/2023 102 (H)  65 - 99 mg/dL Final    BUN 05/09/2023 10  6 - 20 mg/dL  Final    Creatinine 05/09/2023 0.85  0.57 - 1.00 mg/dL Final    Sodium 05/09/2023 137  136 - 145 mmol/L Final    Potassium 05/09/2023 4.2  3.5 - 5.2 mmol/L Final    Chloride 05/09/2023 99  98 - 107 mmol/L Final    CO2 05/09/2023 24.2  22.0 - 29.0 mmol/L Final    Calcium 05/09/2023 9.7  8.6 - 10.5 mg/dL Final    Total Protein 05/09/2023 7.2  6.0 - 8.5 g/dL Final    Albumin 05/09/2023 4.2  3.5 - 5.2 g/dL Final    ALT (SGPT) 05/09/2023 11  1 - 33 U/L Final    AST (SGOT) 05/09/2023 17  1 - 32 U/L Final    Alkaline Phosphatase 05/09/2023 82  39 - 117 U/L Final    Total Bilirubin 05/09/2023 <0.2  0.0 - 1.2 mg/dL Final    Globulin 05/09/2023 3.0  gm/dL Final    A/G Ratio 05/09/2023 1.4  g/dL Final    BUN/Creatinine Ratio 05/09/2023 11.8  7.0 - 25.0 Final    Anion Gap 05/09/2023 13.8  5.0 - 15.0 mmol/L Final    eGFR 05/09/2023 95.8  >60.0 mL/min/1.73 Final    Total Cholesterol 05/09/2023 236 (H)  0 - 200 mg/dL Final    Triglycerides 05/09/2023 191 (H)  0 - 150 mg/dL Final    HDL Cholesterol 05/09/2023 101 (H)  40 - 60 mg/dL Final    LDL Cholesterol  05/09/2023 103 (H)  0 - 100 mg/dL Final    VLDL Cholesterol 05/09/2023 32  5 - 40 mg/dL Final    LDL/HDL Ratio 05/09/2023 0.96   Final         Assessment & Plan   Problems Addressed this Visit    None  Visit Diagnoses       Major depressive disorder, recurrent episode, moderate  (Chronic)   -  Primary    R/O BPD R/O BPAD    Relevant Medications    Vortioxetine HBr (Trintellix) 20 MG tablet    lamoTRIgine (LaMICtal) 100 MG tablet    Anxiety disorder, unspecified type  (Chronic)       Relevant Medications    Vortioxetine HBr (Trintellix) 20 MG tablet    Nightmares        Relevant Medications    Vortioxetine HBr (Trintellix) 20 MG tablet    prazosin (MINIPRESS) 1 MG capsule          Diagnoses         Codes Comments    Major depressive disorder, recurrent episode, moderate    -  Primary ICD-10-CM: F33.1  ICD-9-CM: 296.32 R/O BPD R/O BPAD    Anxiety disorder, unspecified type      ICD-10-CM: F41.9  ICD-9-CM: 300.00     Nightmares     ICD-10-CM: F51.5  ICD-9-CM: 307.47             Visit Diagnoses:    ICD-10-CM ICD-9-CM   1. Major depressive disorder, recurrent episode, moderate  F33.1 296.32   2. Anxiety disorder, unspecified type  F41.9 300.00   3. Nightmares  F51.5 307.47              GOALS:  Short Term Goals: Patient will be compliant with medication, and patient will have no significant medication related side effects.  Patient will be engaged in psychotherapy as indicated.  Patient will report subjective improvement of symptoms.  Long term goals: To stabilize mood and treat/improve subjective symptoms, the patient will stay out of the hospital, the patient will be at an optimal level of functioning, and the patient will take all medications as prescribed.  The patient verbalized understanding and agreement with goals that were mutually set.      TREATMENT PLAN: Take medications as indicated.  Continue psychotherapy.  Medication and treatment options, both pharmacological and non-pharmacological treatment options, discussed during today's visit, including any off label use of medication. Patient acknowledged and verbally consented with current treatment plan and was educated on the importance of compliance with treatment and follow-up appointments.      -Continue Trintellix 20 mg by mouth once daily in the morning with breakfast for moods.  -Continue lamotrigine 100 mg by mouth once daily for mood.  -Continue prazosin 3 mg by mouth once nightly at bedtime for nightmares.      MEDICATION ISSUES:  Discussed medication options and treatment plan of prescribed medication, any off label use of medication, as well as the risks, benefits, any black box warnings including increased suicidality, and side effects including but not limited to potential falls, dizziness, possible impaired driving, GI side effects (change in appetite, abdominal discomfort, nausea, vomiting, diarrhea, and/or  constipation), dry mouth, somnolence, sedation, insomnia, activation, agitation, irritation, tremors, abnormal muscle movements or disorders, headache, sweating, possible bruising or rare bleeding, electrolyte and/or fluid abnormalities, change in blood pressure/heart rate/and or heart rhythm, sexual dysfunction, and metabolic adversities among others. Patient and/or guardian agreeable to call the office with any worsening of symptoms or onset of side effects, or if any concerns or questions arise.  The contact information for the office is made available to the patient and/or guardian.  Patient and/or guardian agreeable to call 911 or go to the nearest ER should they begin having any SI/HI, or if any urgent concerns arise. No medication side effects or related complaints today.    This APRN has discussed the benefits and risks of taking/continuing Lamictal (Lamotrigine).  The side effects of Lamictal can include a benign rash, blurred or double vision, dizziness, ataxia, sedation, headache, tremor, insomnia, poor coordination, fatigue,  nausea, vomiting, dyspepsia, rhinitis, infection, pharyngitis, asthenia, a rare but serious rash, rare multi-organ failure associated with Santana-Jean Syndrome, toxic epidermal necrolysis, drug hypersensitivity syndrome, rare blood dyscrasias, rare aseptic meningitis, rare sudden unexplained deaths in people with epilepsy, withdrawal seizures upon abrupt withdrawal, and rare activation of suicidal ideation and behavior (suicidality).  This APRN has discussed that a very slow dose titration when starting, or changing doses, of Lamictal may reduce the incidence of skin rash and other side effects.  The dosage should not be titrated upwards or increased faster than recommended due to the possibility of the discussed side effects and risk of development of a skin rash (which can become life threatening).    This APRN has also discussed that if the patient stops taking the Lamictal  for 3-5 days or longer, it will be necessary to restart the drug with an initial dose titration, as rashes have been reported on reexposure.  If the patient and Provider decide to stop the Lamictal, the patient will follow the directions of this APRN/this office as a guided taper over about two weeks is appropriate due to the risk of relapse in bipolar disorder with those with a mood or bipolar disorder, the risk of seizures in those with epilepsy, and discontinuation symptoms upon rapid discontinuation of Lamictal.    The patient verbalizes understanding of benefits and risks as discussed, the patient/guardian feels the benefits outweigh the risks and is agreeable to continue/take Lamictal as discussed.  The patient is advised should any side effects or rash develops they are to stop the Lamictal immediately and contact this APRN/this office or go to the emergency department immediately.  The patient verbalizes understanding and agreement with treatment plan in their own words.      VERBAL INFORMED CONSENT FOR MEDICATION:  The patient was educated that their proposed/prescribed psychotropic medication(s) has potential risks, side effects, adverse effects, and black box warnings; and these have been discussed with the patient.  The patient has been informed that their treatment and medication dosage is to be individualized, and may even be above or below the recommended range/dosage due to patient individualization and response, but medication is prescribed using a shared decision making approach, and no medication or dosage will be prescribed without the patient's verbal consent.  The reason for the use of the medication including any off label use and alternative modes of treatment other than or in addition to medication has been considered and discussed, the probable consequences of not receiving the proposed treatment have been discussed, and any treatment side effects, black box warnings, and cautions  associated with treatment have been discussed with the patient.  The patient is allowed ample time to openly discuss and ask questions regarding the proposed medication(s) and treatment plan and the patient verbalizes understanding the reasons for the use of the medication, its potential risks and benefits, other alternative treatment(s), and the probable consequences that may occur if the proposed medication is not given.  The patient has been given ample time to ask questions and study the information and find the information to be specific, accurate, and complete.  The patient gives verbal consent for the medication(s) proposed/prescribed, they verbalized understanding that they can refuse and withdraw consent at any time with the assistance of this APRN, and the patient has verbally confirmed that they are aware, and are willing, to take the prescribed medication and follow the treatment plan with the known possible risks, side effect, black box warnings, and any potential medication interactions, and the patient reports they will be worse off without this medication and treatment plan.  The patient is advised to contact this APRN/this office if any questions or concerns arise at any time (at [phone]), or call 911/go to the closest emergency department if needed or outside of office hours.      SUICIDE RISK ASSESSMENT AND SAFETY PLAN: Unalterable demographics and a history of mental health intervention indicate this patient is in a high risk category compared to the general population. At present, the patient denies active SI/HI, intentions, or plans at this time and agrees to seek immediate care should such thoughts develop. The patient verbalizes understanding of how to access emergency care if needed and agrees to do so. Consideration of suicide risk and protective factors such as history, current presentation, individual strengths and weaknesses, psychosocial and environmental stressors and variables,  psychiatric illness and symptoms, medical conditions and pain, took place in this interview. Based on those considerations, the patient is determined: within individual baseline and presenting no imminent risk for suicide or homicide. Other recommendations: The patient does not meet the criteria for inpatient admission and is not a safety risk to self or others at today's visit. Inpatient treatment offers no significant advantages over outpatient treatment for this patient at today's visit.  The patient was given ample time for questions and fully participated in treatment planning.  The patient was encouraged to call the clinic with any questions or concerns.  The patient was informed of access to emergency care. If patient were to develop any significant symptomatology, suicidal ideation, homicidal ideation, any concerns, or feel unsafe at any time they are to call the clinic and if unable to get immediate assistance should immediately call 911 or go to the nearest emergency room.  Patient contracted verbally for the following: If you are experiencing an emotional crisis or have thoughts of harming yourself or others, please go to your nearest local emergency room or call 911. Will continue to re-assess medication response and side effects frequently to establish efficacy and ensure safety. Risks, any black box warnings, side effects, off label usage, and benefits of medication and treatment discussed with patient, along with potential adverse side effects of current and/or newly prescribed medication, alternative treatment options, and OTC medications.  Patient verbalized understanding of potential risks, any off label use of medication, any black box warnings, and any side effects in their own words. The patient verbalized understanding and agreed to comply with the safety plan discussed in their own words.  Patient given the number to the office. Number also discussed of the 24- hour suicide hotline.            MEDS ORDERED DURING VISIT:  New Medications Ordered This Visit   Medications    Vortioxetine HBr (Trintellix) 20 MG tablet     Sig: Take 1 tablet by mouth Daily With Breakfast.     Dispense:  30 tablet     Refill:  0    prazosin (MINIPRESS) 1 MG capsule     Sig: Take 3 capsules by mouth Every Night.     Dispense:  90 capsule     Refill:  0    lamoTRIgine (LaMICtal) 100 MG tablet     Sig: Take 1 tablet by mouth Daily.     Dispense:  30 tablet     Refill:  0       Return in about 4 weeks (around 1/31/2024), or if symptoms worsen or fail to improve, for Next scheduled follow up and Recheck.       Progress: Not at goal    Functional Status: Moderate impairment     Prognosis: Good with Ongoing Treatment              This document has been electronically signed by ARVIN Tamez  January 3, 2024 12:12 EST    Some of the data in this electronic note has been brought forward from a previous encounter, any necessary changes have been made, it has been reviewed by this APRN, and it is accurate.    Please note that portions of this note were completed with a voice recognition program.

## 2024-01-16 ENCOUNTER — TELEMEDICINE (OUTPATIENT)
Dept: PSYCHIATRY | Facility: CLINIC | Age: 29
End: 2024-01-16
Payer: COMMERCIAL

## 2024-01-16 DIAGNOSIS — D50.8 IRON DEFICIENCY ANEMIA SECONDARY TO INADEQUATE DIETARY IRON INTAKE: ICD-10-CM

## 2024-01-16 DIAGNOSIS — F33.1 MODERATE EPISODE OF RECURRENT MAJOR DEPRESSIVE DISORDER: ICD-10-CM

## 2024-01-16 DIAGNOSIS — F41.1 GENERALIZED ANXIETY DISORDER: Primary | ICD-10-CM

## 2024-01-16 RX ORDER — FERROUS GLUCONATE 324(38)MG
324 TABLET ORAL
Qty: 30 TABLET | Refills: 0 | Status: SHIPPED | OUTPATIENT
Start: 2024-01-16

## 2024-01-30 ENCOUNTER — TELEMEDICINE (OUTPATIENT)
Dept: PSYCHIATRY | Facility: CLINIC | Age: 29
End: 2024-01-30
Payer: COMMERCIAL

## 2024-01-30 DIAGNOSIS — F33.1 MODERATE EPISODE OF RECURRENT MAJOR DEPRESSIVE DISORDER: ICD-10-CM

## 2024-01-30 DIAGNOSIS — F41.1 GENERALIZED ANXIETY DISORDER: Primary | ICD-10-CM

## 2024-01-31 ENCOUNTER — TELEMEDICINE (OUTPATIENT)
Dept: PSYCHIATRY | Facility: CLINIC | Age: 29
End: 2024-01-31
Payer: COMMERCIAL

## 2024-01-31 DIAGNOSIS — F41.9 ANXIETY DISORDER, UNSPECIFIED TYPE: Chronic | ICD-10-CM

## 2024-01-31 DIAGNOSIS — F33.1 MAJOR DEPRESSIVE DISORDER, RECURRENT EPISODE, MODERATE: Primary | Chronic | ICD-10-CM

## 2024-01-31 DIAGNOSIS — F51.5 NIGHTMARES: ICD-10-CM

## 2024-01-31 RX ORDER — PRAZOSIN HYDROCHLORIDE 1 MG/1
3 CAPSULE ORAL NIGHTLY
Qty: 90 CAPSULE | Refills: 0 | Status: SHIPPED | OUTPATIENT
Start: 2024-01-31

## 2024-01-31 RX ORDER — LAMOTRIGINE 100 MG/1
100 TABLET ORAL DAILY
Qty: 30 TABLET | Refills: 0 | Status: SHIPPED | OUTPATIENT
Start: 2024-01-31

## 2024-01-31 RX ORDER — VORTIOXETINE 20 MG/1
20 TABLET, FILM COATED ORAL
Qty: 30 TABLET | Refills: 0 | Status: SHIPPED | OUTPATIENT
Start: 2024-01-31

## 2024-01-31 NOTE — PROGRESS NOTES
This provider is located at the [facility] (through [facility]), [address], [facility], [address] using a secure [employer] Visit through [employer]. Patient is being seen remotely via telehealth at their home address in [place], and stated they are in a secure environment for this session. The patient's condition being diagnosed/treated is appropriate for telemedicine. The provider identified herself as well as her credentials.   The patient, and/or patients guardian, consent to be seen remotely, and when consent is given they understand that the consent allows for patient identifiable information to be sent to a third party as needed.   They may refuse to be seen remotely at any time. The electronic data is encrypted and password protected, and the patient and/or guardian has been advised of the potential risks to privacy not withstanding such measures.    You have chosen to receive care through a telehealth visit.  Do you consent to use a video/audio connection for your medical care today? Yes    Patient identifiers utilized: Name and date of birth.    Patient verbally confirmed consent for today's encounter  01/31/2024 .    The patient does verbally confirm that they are being seen today while in the [facility].  The  provider/this APRN is licensed in the [facility] where the patient is located/being seen.    Subjective   Maddy Chisholm is a 29 y.o. female who presents today for follow up    Chief Complaint: Medication management follow-up - Mood/depression, anxiety, sleeping difficulties, and nightmares follow-up    Accompanied by: The patient is alone at today's encounter    History of Present Illness:   The patient describes her mood as doing good overall and remaining stable since her last encounter with this APRN.  The patient reports she is continuing in psychotherapy, and reports psychotherapy has been going good.  The patient reports  she will be losing her Medicaid coverage after this month, and she will begin insurance coverage through her employer.  The patient reports she is not sure if that will effect future visits, but she is going to try to make sure that [facility] is in network, and if there are any concerns or questions she will reach out to this office.  The patient rates her symptoms of depression at a 6/10 on a 0-10 scale, with 10 being the worst.  The patient rates her symptoms of anxiety at a 6/10 on a 0-10 scale, with 10 being the worst.  The patient reports rating her symptoms of depression and anxiety a little higher due to some financial stressors in her life, and also because she is worried about both her cat and dog's health, as they are currently under treatment at the [facility] for some health issues.  The patient reports her appetite as good.  The patient reports her sleep as good.  The patient reports she still has some bad dreams/nightmares, but not regularly, and reports prazosin is effective.  The patient reports she thinks she will always have occasional bad dreams, but she does report they are improved controlled on her current treatment regimen.  The patient denies any new medical problems or changes in medications since last appointment with this facility.  The patient reports compliance with her current medication regimen.  The patient denies any side effects, rashes, or concerns from her current medication regimen.  The patient denies any auditory hallucinations or visual hallucinations.  The patient denies any reckless, impulsive, or risky behaviors.  The patient does not endorse any significant symptoms consistent with oskar or psychosis at today's encounter.  The patient denies any self-injurious behaviors.  The patient denies any suicidal or homicidal ideations, plans, or intent at today's encounter and is convincing.  The patient reports she would like to not adjust or change her current treatment regimen at  today's encounter.  The patient does verbally contract for safety at today's encounter and is in verbal agreement with the safety/crisis plan. The patient reports in their own words that they will reach out to this APRN/office prior to next scheduled appointment if there is any worsening of mood, any new psychiatric symptoms, any medication side effects or concerns, any concern for safety to self or others, any suicidal or homicidal ideations plans or intent, or any concerns, or they will call 911, call or text the suicide and crisis lifeline at [phone], or go to the closest emergency department.      PHQ-9 Depression Screening  Little interest or pleasure in doing things? (P) 1-->several days   Feeling down, depressed, or hopeless? (P) 0-->not at all   Trouble falling or staying asleep, or sleeping too much? (P) 1-->several days   Feeling tired or having little energy? (P) 1-->several days   Poor appetite or overeating? (P) 1-->several days   Feeling bad about yourself - or that you are a failure or have let yourself or your family down? (P) 1-->several days   Trouble concentrating on things, such as reading the newspaper or watching television? (P) 1-->several days   Moving or speaking so slowly that other people could have noticed? Or the opposite - being so fidgety or restless that you have been moving around a lot more than usual? (P) 0-->not at all   Thoughts that you would be better off dead, or of hurting yourself in some way? (P) 0-->not at all   PHQ-9 Total Score (P) 6   If you checked off any problems, how difficult have these problems made it for you to do your work, take care of things at home, or get along with other people? (P) somewhat difficult     PHQ-9 Total Score: (P) 6      JAIRO-7  Feeling nervous, anxious or on edge: (P) Several days  Not being able to stop or control worrying: (P) Several days  Worrying too much about different things: (P) Several days  Trouble Relaxing: (P) Not at all  Being so  restless that it is hard to sit still: (P) Not at all  Feeling afraid as if something awful might happen: (P) Not at all  Becoming easily annoyed or irritable: (P) Not at all  JAIRO 7 Total Score: (P) 3  If you checked any problems, how difficult have these problems made it for you to do your work, take care of things at home, or get along with other people: (P) Somewhat difficult      All Known Prior Psychiatric Medications:  -Lexapro - decreased her libido so she stopped taking it (can't remember if it helped her mood or not)  -Prozac  -Wellbutrin  -Hydroxyzine  -Lamictal  -Prazosin  -Trintellix      Last Menstrual Period:  1/24/2023.  The patient denies chance of pregnancy.  The patient was educated that her prescribed medications can have potential risk to a developing fetus. The patient is advised to contact this APRN/this office if she becomes pregnant or plans to become pregnant.  Pt verbalizes understanding and acknowledged agreement with this plan in her own words.        The following portions of the patient's history were reviewed and updated as appropriate: allergies, current medications, past family history, past medical history, past social history, past surgical history and problem list.            Past Medical History:  Past Medical History:   Diagnosis Date    Anemia     Anxiety     COVID 08/2022    Depression     Vitamin B12 deficiency        Social History:  Social History     Socioeconomic History    Marital status: Single    Number of children: 0   Tobacco Use    Smoking status: Former     Packs/day: 0.20     Years: 0.25     Additional pack years: 0.00     Total pack years: 0.05     Types: Cigarettes     Start date: 5/1/2020     Quit date: 8/15/2020     Years since quitting: 3.4    Smokeless tobacco: Never    Tobacco comments:     smoked for 5 years  (black and milds for 5 years then restarted 5/2020 and quit 8/2020   Vaping Use    Vaping Use: Never used   Substance and Sexual Activity    Alcohol  use: Yes     Comment: Patient reports 1-2 times weekly use    Drug use: Not Currently     Comment: The patient states in her past she has used marijuanna, but not currently    Sexual activity: Yes     Partners: Male     Birth control/protection: OCP       Family History:  Family History   Adopted: Yes       Past Surgical History:  Past Surgical History:   Procedure Laterality Date    NO PAST SURGERIES         Problem List:  Patient Active Problem List   Diagnosis    Iron deficiency anemia secondary to inadequate dietary iron intake    B12 deficiency    Anxiety    BV (bacterial vaginosis)    Allergic rhinitis    Vitiligo    Generalized anxiety disorder    Moderate episode of recurrent major depressive disorder    Restless leg    Excessive sweating       Allergy:   No Known Allergies     Current Medications:   Current Outpatient Medications   Medication Sig Dispense Refill    lamoTRIgine (LaMICtal) 100 MG tablet Take 1 tablet by mouth Daily. 30 tablet 0    prazosin (MINIPRESS) 1 MG capsule Take 3 capsules by mouth Every Night. 90 capsule 0    Vortioxetine HBr (Trintellix) 20 MG tablet Take 1 tablet by mouth Daily With Breakfast. 30 tablet 0    drospirenone-ethinyl estradiol (Machelle) 3-0.03 MG per tablet Take 1 tablet by mouth Daily. 84 tablet 3    ferrous gluconate (FERGON) 324 MG tablet Take 1 tablet by mouth Daily With Breakfast. 30 tablet 0    fluticasone (FLONASE) 50 MCG/ACT nasal spray 2 sprays by Each Nare route Daily. 16 g 3     No current facility-administered medications for this visit.         Review of Symptoms:    Review of Systems   Psychiatric/Behavioral:  Positive for depressed mood and stress. Negative for agitation, behavioral problems, dysphoric mood, hallucinations, self-injury, suicidal ideas and negative for hyperactivity. Sleep disturbance: improved with medication.The patient is nervous/anxious.          Physical Exam:   not currently breastfeeding. There is no height or weight on file to  calculate BMI.   Due to the remote nature of this encounter (virtual encounter), vitals were unable to be obtained.  Height stated at 66.5 inches.  Weight stated around 182 pounds.      Physical Exam  Neurological:      Mental Status: She is alert and oriented to person, place, and time.   Psychiatric:         Attention and Perception: Attention normal.         Mood and Affect: Mood and affect normal.         Speech: Speech normal.         Behavior: Behavior normal. Behavior is cooperative.         Thought Content: Thought content normal. Thought content is not paranoid or delusional. Thought content does not include homicidal or suicidal ideation. Thought content does not include homicidal or suicidal plan.         Cognition and Memory: Cognition and memory normal.         Judgment: Judgment normal.         Mental Status Exam:   Hygiene:   good  Cooperation:  Cooperative and attentive  Eye Contact:  Good  Psychomotor Behavior:  Appropriate  Affect:  Appropriate  Mood: normal  Hopelessness: Denies  Speech:  Normal  Thought Process:  Linear  Thought Content:  Mood congruent  Suicidal:  None  Homicidal:  None  Hallucinations:  None  Delusion:  None  Memory:  Intact  Orientation:  Person, Place, Time and Situation  Reliability:  good  Insight:  Good  Judgement:  Good  Impulse Control:  Good  Physical/Medical Issues:  No            Lab Results:   No visits with results within 1 Month(s) from this visit.   Latest known visit with results is:   Lab on 05/09/2023   Component Date Value Ref Range Status    Vitamin B-12 05/09/2023 608  211 - 946 pg/mL Final    Iron 05/09/2023 41  37 - 145 mcg/dL Final    Iron Saturation (TSAT) 05/09/2023 7 (L)  20 - 50 % Final    Transferrin 05/09/2023 401 (H)  200 - 360 mg/dL Final    TIBC 05/09/2023 597 (H)  298 - 536 mcg/dL Final    Ferritin 05/09/2023 35.20  13.00 - 150.00 ng/mL Final    WBC 05/09/2023 7.08  3.40 - 10.80 10*3/mm3 Final    RBC 05/09/2023 5.01  3.77 - 5.28 10*6/mm3 Final     Hemoglobin 05/09/2023 11.7 (L)  12.0 - 15.9 g/dL Final    Hematocrit 05/09/2023 36.7  34.0 - 46.6 % Final    MCV 05/09/2023 73.3 (L)  79.0 - 97.0 fL Final    MCH 05/09/2023 23.4 (L)  26.6 - 33.0 pg Final    MCHC 05/09/2023 31.9  31.5 - 35.7 g/dL Final    RDW 05/09/2023 14.6  12.3 - 15.4 % Final    RDW-SD 05/09/2023 38.4  37.0 - 54.0 fl Final    MPV 05/09/2023 10.2  6.0 - 12.0 fL Final    Platelets 05/09/2023 244  140 - 450 10*3/mm3 Final    Glucose 05/09/2023 102 (H)  65 - 99 mg/dL Final    BUN 05/09/2023 10  6 - 20 mg/dL Final    Creatinine 05/09/2023 0.85  0.57 - 1.00 mg/dL Final    Sodium 05/09/2023 137  136 - 145 mmol/L Final    Potassium 05/09/2023 4.2  3.5 - 5.2 mmol/L Final    Chloride 05/09/2023 99  98 - 107 mmol/L Final    CO2 05/09/2023 24.2  22.0 - 29.0 mmol/L Final    Calcium 05/09/2023 9.7  8.6 - 10.5 mg/dL Final    Total Protein 05/09/2023 7.2  6.0 - 8.5 g/dL Final    Albumin 05/09/2023 4.2  3.5 - 5.2 g/dL Final    ALT (SGPT) 05/09/2023 11  1 - 33 U/L Final    AST (SGOT) 05/09/2023 17  1 - 32 U/L Final    Alkaline Phosphatase 05/09/2023 82  39 - 117 U/L Final    Total Bilirubin 05/09/2023 <0.2  0.0 - 1.2 mg/dL Final    Globulin 05/09/2023 3.0  gm/dL Final    A/G Ratio 05/09/2023 1.4  g/dL Final    BUN/Creatinine Ratio 05/09/2023 11.8  7.0 - 25.0 Final    Anion Gap 05/09/2023 13.8  5.0 - 15.0 mmol/L Final    eGFR 05/09/2023 95.8  >60.0 mL/min/1.73 Final    Total Cholesterol 05/09/2023 236 (H)  0 - 200 mg/dL Final    Triglycerides 05/09/2023 191 (H)  0 - 150 mg/dL Final    HDL Cholesterol 05/09/2023 101 (H)  40 - 60 mg/dL Final    LDL Cholesterol  05/09/2023 103 (H)  0 - 100 mg/dL Final    VLDL Cholesterol 05/09/2023 32  5 - 40 mg/dL Final    LDL/HDL Ratio 05/09/2023 0.96   Final         Assessment & Plan   Problems Addressed this Visit    None  Visit Diagnoses       Major depressive disorder, recurrent episode, moderate  (Chronic)   -  Primary    R/O BPD R/O BPAD    Relevant Medications     Vortioxetine HBr (Trintellix) 20 MG tablet    lamoTRIgine (LaMICtal) 100 MG tablet    Anxiety disorder, unspecified type  (Chronic)       Relevant Medications    Vortioxetine HBr (Trintellix) 20 MG tablet    Nightmares        Relevant Medications    Vortioxetine HBr (Trintellix) 20 MG tablet    prazosin (MINIPRESS) 1 MG capsule          Diagnoses         Codes Comments    Major depressive disorder, recurrent episode, moderate    -  Primary ICD-10-CM: F33.1  ICD-9-CM: 296.32 R/O BPD R/O BPAD    Anxiety disorder, unspecified type     ICD-10-CM: F41.9  ICD-9-CM: 300.00     Nightmares     ICD-10-CM: F51.5  ICD-9-CM: 307.47             Visit Diagnoses:    ICD-10-CM ICD-9-CM   1. Major depressive disorder, recurrent episode, moderate  F33.1 296.32   2. Anxiety disorder, unspecified type  F41.9 300.00   3. Nightmares  F51.5 307.47            GOALS:  Short Term Goals: Patient will be compliant with medication, and patient will have no significant medication related side effects.  Patient will be engaged in psychotherapy as indicated.  Patient will report subjective improvement of symptoms.  Long term goals: To stabilize mood and treat/improve subjective symptoms, the patient will stay out of the hospital, the patient will be at an optimal level of functioning, and the patient will take all medications as prescribed.  The patient verbalized understanding and agreement with goals that were mutually set.      TREATMENT PLAN: Take medications as indicated.  Continue psychotherapy.  Medication and treatment options, both pharmacological and non-pharmacological treatment options, discussed during today's visit, including any off label use of medication. Patient acknowledged and verbally consented with current treatment plan and was educated on the importance of compliance with treatment and follow-up appointments.      -Continue Trintellix 20 mg by mouth once daily in the morning with breakfast for moods.  -Continue lamotrigine  100 mg by mouth once daily for mood.  -Continue prazosin 3 mg by mouth once nightly at bedtime for nightmares.      MEDICATION ISSUES:  Discussed medication options and treatment plan of prescribed medication, any off label use of medication, as well as the risks, benefits, any black box warnings including increased suicidality, and side effects including but not limited to potential falls, dizziness, possible impaired driving, GI side effects (change in appetite, abdominal discomfort, nausea, vomiting, diarrhea, and/or constipation), dry mouth, somnolence, sedation, insomnia, activation, agitation, irritation, tremors, abnormal muscle movements or disorders, headache, sweating, possible bruising or rare bleeding, electrolyte and/or fluid abnormalities, change in blood pressure/heart rate/and or heart rhythm, sexual dysfunction, and metabolic adversities among others. Patient and/or guardian agreeable to call the office with any worsening of symptoms or onset of side effects, or if any concerns or questions arise.  The contact information for the office is made available to the patient and/or guardian.  Patient and/or guardian agreeable to call 911 or go to the nearest ER should they begin having any SI/HI, or if any urgent concerns arise. No medication side effects or related complaints today.    This APRN has discussed the benefits and risks of taking/continuing Lamictal (Lamotrigine).  The side effects of Lamictal can include a benign rash, blurred or double vision, dizziness, ataxia, sedation, headache, tremor, insomnia, poor coordination, fatigue,  nausea, vomiting, dyspepsia, rhinitis, infection, pharyngitis, asthenia, a rare but serious rash, rare multi-organ failure associated with Santana-Jean Syndrome, toxic epidermal necrolysis, drug hypersensitivity syndrome, rare blood dyscrasias, rare aseptic meningitis, rare sudden unexplained deaths in people with epilepsy, withdrawal seizures upon abrupt  withdrawal, and rare activation of suicidal ideation and behavior (suicidality).  This APRN has discussed that a very slow dose titration when starting, or changing doses, of Lamictal may reduce the incidence of skin rash and other side effects.  The dosage should not be titrated upwards or increased faster than recommended due to the possibility of the discussed side effects and risk of development of a skin rash (which can become life threatening).    This APRN has also discussed that if the patient stops taking the Lamictal for 3-5 days or longer, it will be necessary to restart the drug with an initial dose titration, as rashes have been reported on reexposure.  If the patient and Provider decide to stop the Lamictal, the patient will follow the directions of this APRN/this office as a guided taper over about two weeks is appropriate due to the risk of relapse in bipolar disorder with those with a mood or bipolar disorder, the risk of seizures in those with epilepsy, and discontinuation symptoms upon rapid discontinuation of Lamictal.    The patient verbalizes understanding of benefits and risks as discussed, the patient/guardian feels the benefits outweigh the risks and is agreeable to continue/take Lamictal as discussed.  The patient is advised should any side effects or rash develops they are to stop the Lamictal immediately and contact this APRN/this office or go to the emergency department immediately.  The patient verbalizes understanding and agreement with treatment plan in their own words.      VERBAL INFORMED CONSENT FOR MEDICATION:  The patient was educated that their proposed/prescribed psychotropic medication(s) has potential risks, side effects, adverse effects, and black box warnings; and these have been discussed with the patient.  The patient has been informed that their treatment and medication dosage is to be individualized, and may even be above or below the recommended range/dosage due to  patient individualization and response, but medication is prescribed using a shared decision making approach, and no medication or dosage will be prescribed without the patient's verbal consent.  The reason for the use of the medication including any off label use and alternative modes of treatment other than or in addition to medication has been considered and discussed, the probable consequences of not receiving the proposed treatment have been discussed, and any treatment side effects, black box warnings, and cautions associated with treatment have been discussed with the patient.  The patient is allowed ample time to openly discuss and ask questions regarding the proposed medication(s) and treatment plan and the patient verbalizes understanding the reasons for the use of the medication, its potential risks and benefits, other alternative treatment(s), and the probable consequences that may occur if the proposed medication is not given.  The patient has been given ample time to ask questions and study the information and find the information to be specific, accurate, and complete.  The patient gives verbal consent for the medication(s) proposed/prescribed, they verbalized understanding that they can refuse and withdraw consent at any time with the assistance of this APRN, and the patient has verbally confirmed that they are aware, and are willing, to take the prescribed medication and follow the treatment plan with the known possible risks, side effect, black box warnings, and any potential medication interactions, and the patient reports they will be worse off without this medication and treatment plan.  The patient is advised to contact this APRN/this office if any questions or concerns arise at any time (at [phone]), or call 911/go to the closest emergency department if needed or outside of office hours.      SUICIDE RISK ASSESSMENT AND SAFETY PLAN: Unalterable demographics and a history of mental health  intervention indicate this patient is in a high risk category compared to the general population. At present, the patient denies active SI/HI, intentions, or plans at this time and agrees to seek immediate care should such thoughts develop. The patient verbalizes understanding of how to access emergency care if needed and agrees to do so. Consideration of suicide risk and protective factors such as history, current presentation, individual strengths and weaknesses, psychosocial and environmental stressors and variables, psychiatric illness and symptoms, medical conditions and pain, took place in this interview. Based on those considerations, the patient is determined: within individual baseline and presenting no imminent risk for suicide or homicide. Other recommendations: The patient does not meet the criteria for inpatient admission and is not a safety risk to self or others at today's visit. Inpatient treatment offers no significant advantages over outpatient treatment for this patient at today's visit.  The patient was given ample time for questions and fully participated in treatment planning.  The patient was encouraged to call the clinic with any questions or concerns.  The patient was informed of access to emergency care. If patient were to develop any significant symptomatology, suicidal ideation, homicidal ideation, any concerns, or feel unsafe at any time they are to call the clinic and if unable to get immediate assistance should immediately call 911 or go to the nearest emergency room.  Patient contracted verbally for the following: If you are experiencing an emotional crisis or have thoughts of harming yourself or others, please go to your nearest local emergency room or call 911. Will continue to re-assess medication response and side effects frequently to establish efficacy and ensure safety. Risks, any black box warnings, side effects, off label usage, and benefits of medication and treatment  discussed with patient, along with potential adverse side effects of current and/or newly prescribed medication, alternative treatment options, and OTC medications.  Patient verbalized understanding of potential risks, any off label use of medication, any black box warnings, and any side effects in their own words. The patient verbalized understanding and agreed to comply with the safety plan discussed in their own words.  Patient given the number to the office. Number also discussed of the 24- hour suicide hotline.           MEDS ORDERED DURING VISIT:  New Medications Ordered This Visit   Medications    Vortioxetine HBr (Trintellix) 20 MG tablet     Sig: Take 1 tablet by mouth Daily With Breakfast.     Dispense:  30 tablet     Refill:  0    prazosin (MINIPRESS) 1 MG capsule     Sig: Take 3 capsules by mouth Every Night.     Dispense:  90 capsule     Refill:  0    lamoTRIgine (LaMICtal) 100 MG tablet     Sig: Take 1 tablet by mouth Daily.     Dispense:  30 tablet     Refill:  0       Return in about 4 weeks (around 2/28/2024), or if symptoms worsen or fail to improve, for Next scheduled follow up and Recheck.       Progress Towards Goal: Not at goal    Functional Status: Mild impairment     Prognosis: Good with Ongoing Treatment              This document has been electronically signed by ARVIN Tamez  January 31, 2024 12:12 EST    Some of the data in this electronic note has been brought forward from a previous encounter, any necessary changes have been made, it has been reviewed by this APRN, and it is accurate.    Please note that portions of this note were completed with a voice recognition program.

## 2024-04-02 DIAGNOSIS — D50.8 IRON DEFICIENCY ANEMIA SECONDARY TO INADEQUATE DIETARY IRON INTAKE: ICD-10-CM

## 2024-04-02 DIAGNOSIS — Z30.41 ENCOUNTER FOR SURVEILLANCE OF CONTRACEPTIVE PILLS: ICD-10-CM

## 2024-04-02 RX ORDER — FERROUS GLUCONATE 324(38)MG
324 TABLET ORAL
Qty: 30 TABLET | Refills: 0 | Status: SHIPPED | OUTPATIENT
Start: 2024-04-02

## 2024-04-02 RX ORDER — DROSPIRENONE AND ETHINYL ESTRADIOL 0.03MG-3MG
1 KIT ORAL DAILY
Qty: 84 TABLET | Refills: 0 | Status: SHIPPED | OUTPATIENT
Start: 2024-04-02

## 2024-04-02 NOTE — TELEPHONE ENCOUNTER
Rx Refill Note  Requested Prescriptions     Pending Prescriptions Disp Refills    drospirenone-ethinyl estradiol (Machelle) 3-0.03 MG per tablet 84 tablet 3     Sig: Take 1 tablet by mouth Daily.    ferrous gluconate (FERGON) 324 MG tablet 30 tablet 0     Sig: Take 1 tablet by mouth Daily With Breakfast.      Last office visit with prescribing clinician: 5/9/2023   Last telemedicine visit with prescribing clinician: Visit date not found   Next office visit with prescribing clinician: 5/13/2024       Maral Colvin MA  04/02/24, 15:16 EDT

## 2024-04-07 DIAGNOSIS — F33.1 MAJOR DEPRESSIVE DISORDER, RECURRENT EPISODE, MODERATE: Chronic | ICD-10-CM

## 2024-04-07 DIAGNOSIS — F41.9 ANXIETY DISORDER, UNSPECIFIED TYPE: Chronic | ICD-10-CM

## 2024-04-08 RX ORDER — VORTIOXETINE 20 MG/1
20 TABLET, FILM COATED ORAL
Qty: 30 TABLET | Refills: 0 | Status: SHIPPED | OUTPATIENT
Start: 2024-04-08

## 2024-04-08 NOTE — TELEPHONE ENCOUNTER
Pt's regular provider is out of the office today. This patient does not have a follow up appointment, please schedule her one with regular provider. I will send in a 30 day supply on regular provider's behalf.

## 2024-04-18 ENCOUNTER — TELEMEDICINE (OUTPATIENT)
Dept: PSYCHIATRY | Facility: CLINIC | Age: 29
End: 2024-04-18
Payer: COMMERCIAL

## 2024-04-18 DIAGNOSIS — F41.9 ANXIETY DISORDER, UNSPECIFIED TYPE: Chronic | ICD-10-CM

## 2024-04-18 DIAGNOSIS — F33.1 MAJOR DEPRESSIVE DISORDER, RECURRENT EPISODE, MODERATE: Primary | Chronic | ICD-10-CM

## 2024-04-18 DIAGNOSIS — F51.5 NIGHTMARES: ICD-10-CM

## 2024-04-18 RX ORDER — LAMOTRIGINE 100 MG/1
100 TABLET ORAL DAILY
Qty: 30 TABLET | Refills: 0 | Status: SHIPPED | OUTPATIENT
Start: 2024-04-18

## 2024-04-18 RX ORDER — PRAZOSIN HYDROCHLORIDE 1 MG/1
3 CAPSULE ORAL NIGHTLY
Qty: 90 CAPSULE | Refills: 0 | Status: SHIPPED | OUTPATIENT
Start: 2024-04-18

## 2024-04-18 NOTE — PROGRESS NOTES
This provider is located at the [facility] (through [facility]), [address] [place], [facility], [address] using a secure [employer] Video Visit through [employer]. Patient is being seen remotely via telehealth at their home address in [place], and stated they are in a secure environment for this session. The patient's condition being diagnosed/treated is appropriate for telemedicine. The provider identified herself as well as her credentials.   The patient, and/or patients guardian, consent to be seen remotely, and when consent is given they understand that the consent allows for patient identifiable information to be sent to a third party as needed.   They may refuse to be seen remotely at any time. The electronic data is encrypted and password protected, and the patient and/or guardian has been advised of the potential risks to privacy not withstanding such measures.    You have chosen to receive care through a telehealth visit.  Do you consent to use a video/audio connection for your medical care today? Yes    Patient identifiers utilized: Name and date of birth.    Patient verbally confirmed consent for today's encounter  04/18/2024 .    The patient does verbally confirm that they are being seen today while in the [facility].  The  provider/this APRN is licensed in the [facility] where the patient is located/being seen.    Subjective   Maddy Chisholm is a 29 y.o. female who presents today for follow up    Chief Complaint: Medication management follow-up - Mood/depression, anxiety, sleeping difficulties, and nightmares follow-up    Accompanied by: The patient is alone at today's encounter    History of Present Illness:   -Since last encounter with this APRN/Office: The patient reports she recently went through some insurance changes and was unable to follow up with this APRN/office until currently.  The patient reports she has continued to take lamotrigine  and prazosin, but has been out of Trintellix for 2 weeks or longer.  -Mood reported as: Both depressed and anxious, and with worsened mood, since being off of Trintellix.  -Patient rates symptoms of depression at a 7/10 on a 0-10 scale, with 10 being the worst.  Patient reported symptoms of depression include feeling sad and irritability.  -Patient rates symptoms of anxiety at a 6.5/10 on a 0-10 scale, with 10 being the worst.  Patient reported symptoms of anxiety include stress and feeling irritable at times.    -Appetite reported as: Good  -Sleep reported as: Good.  The patient denies any recent nightmares with continued prazosin use.    -Changes in medications or new medical problems/concerns since last visit: Denies  -Reported medication compliance: The patient reports compliance with their current psychotropic medication regimen including lamotrigine and prazosin, but reports she has been out of Trintellix for 2 weeks or longer.  -Reported medication side effects or concerns: Denies any    -Auditory or visual hallucinations: Denies  -Behaviors different from patient baseline, or any reckless, impulsive, or risky behaviors: Denies  -Symptoms of oskar or psychosis: Denies  -Self-injurious behavior: Denies  -SI/HI: The patient adamantly denies any suicidal or homicidal ideations, plans, or intent at the time of this encounter and is convincing.  The patient's PHQ-9 for today's encounter did screen positive for possible SI, but the patient reports these are more negative thoughts and feelings towards herself, and feelings of guilt for things that she has not done that she wants to do such as visiting her family more.  The patient reports she does not want to die, harm herself, or injure herself in any way, or anybody else.  The patient is able to report protective factors against suicide.  Again, the patient adamantly denies any suicidal or homicidal ideations, plans, or intent at the time of this encounter and is  convincing.    -Using a shared decision-making approach the patient reports she is pleased with the effectiveness of her current treatment regimen, and she does not want to make any changes in lamotrigine or prazosin, but she reports she does want to restart Trintellix as she has been off of this for 2 weeks or longer.  The patient reports verbalizing understanding and agreement in her own words of restarting Trintellix at a lower dosage and building the dosage back up as needed and tolerated.    -The patient does verbally contract for safety at today's encounter and is in verbal agreement with the safety/crisis plan. The patient reports in their own words that they will reach out to this APRN/office prior to next scheduled appointment if there is any worsening of mood, any new psychiatric symptoms, any medication side effects or concerns, any concern for safety to self or others, any suicidal or homicidal ideations plans or intent, or any concerns, or they will call 911, call or text the suicide and crisis lifeline at [phone], or go to the closest emergency department.      PHQ-9 Depression Screening  Little interest or pleasure in doing things? (P) 0-->not at all   Feeling down, depressed, or hopeless? (P) 2-->more than half the days   Trouble falling or staying asleep, or sleeping too much? (P) 3-->nearly every day   Feeling tired or having little energy? (P) 3-->nearly every day   Poor appetite or overeating? (P) 2-->more than half the days   Feeling bad about yourself - or that you are a failure or have let yourself or your family down? (P) 2-->more than half the days   Trouble concentrating on things, such as reading the newspaper or watching television? (P) 1-->several days   Moving or speaking so slowly that other people could have noticed? Or the opposite - being so fidgety or restless that you have been moving around a lot more than usual? (P) 0-->not at all   Thoughts that you would be better off dead, or of  hurting yourself in some way? (P) 1-->several days   PHQ-9 Total Score (P) 14   If you checked off any problems, how difficult have these problems made it for you to do your work, take care of things at home, or get along with other people? (P) somewhat difficult     PHQ-9 Total Score: (P) 14      JAIRO-7  Feeling nervous, anxious or on edge: (P) Several days  Not being able to stop or control worrying: (P) Several days  Worrying too much about different things: (P) Several days  Trouble Relaxing: (P) Not at all  Being so restless that it is hard to sit still: (P) Several days  Feeling afraid as if something awful might happen: (P) Not at all  Becoming easily annoyed or irritable: (P) Nearly every day  JAIRO 7 Total Score: (P) 7  If you checked any problems, how difficult have these problems made it for you to do your work, take care of things at home, or get along with other people: (P) Somewhat difficult      All Known Prior Psychiatric Medications:  -Lexapro - decreased her libido so she stopped taking it (can't remember if it helped her mood or not)  -Prozac  -Wellbutrin  -Hydroxyzine  -Lamictal  -Prazosin  -Trintellix      Last Menstrual Period:  The patient denies chance of pregnancy today's encounter.  The patient was educated that her prescribed medications can have potential risk to a developing fetus. The patient is advised to contact this APRN/this office if she becomes pregnant or plans to become pregnant.  Pt verbalizes understanding and acknowledged agreement with this plan in her own words.        The following portions of the patient's history were reviewed and updated as appropriate: allergies, current medications, past family history, past medical history, past social history, past surgical history and problem list.            Past Medical History:  Past Medical History:   Diagnosis Date    Anemia     Anxiety     COVID 08/2022    Depression     Vitamin B12 deficiency        Social History:  Social  History     Socioeconomic History    Marital status: Single    Number of children: 0   Tobacco Use    Smoking status: Former     Current packs/day: 0.00     Average packs/day: 0.2 packs/day for 0.3 years (0.1 ttl pk-yrs)     Types: Cigarettes     Start date: 5/1/2020     Quit date: 8/15/2020     Years since quitting: 3.6    Smokeless tobacco: Never    Tobacco comments:     smoked for 5 years  (black and milds for 5 years then restarted 5/2020 and quit 8/2020   Vaping Use    Vaping status: Never Used   Substance and Sexual Activity    Alcohol use: Yes     Comment: Patient reports 1-2 times weekly use    Drug use: Not Currently     Comment: The patient states in her past she has used marijuanna, but not currently    Sexual activity: Yes     Partners: Male     Birth control/protection: OCP       Family History:  Family History   Adopted: Yes       Past Surgical History:  Past Surgical History:   Procedure Laterality Date    NO PAST SURGERIES         Problem List:  Patient Active Problem List   Diagnosis    Iron deficiency anemia secondary to inadequate dietary iron intake    B12 deficiency    Anxiety    BV (bacterial vaginosis)    Allergic rhinitis    Vitiligo    Generalized anxiety disorder    Moderate episode of recurrent major depressive disorder    Restless leg    Excessive sweating       Allergy:   No Known Allergies     Current Medications:   Current Outpatient Medications   Medication Sig Dispense Refill    lamoTRIgine (LaMICtal) 100 MG tablet Take 1 tablet by mouth Daily. 30 tablet 0    prazosin (MINIPRESS) 1 MG capsule Take 3 capsules by mouth Every Night. 90 capsule 0    drospirenone-ethinyl estradiol (Machelle) 3-0.03 MG per tablet Take 1 tablet by mouth Daily. 84 tablet 0    ferrous gluconate (FERGON) 324 MG tablet Take 1 tablet by mouth Daily With Breakfast. 30 tablet 0    fluticasone (FLONASE) 50 MCG/ACT nasal spray 2 sprays by Each Nare route Daily. 16 g 3    Vortioxetine HBr (Trintellix) 5 MG tablet tablet  Take 1 tablet by mouth Daily With Breakfast for 14 days, THEN 2 tablets Daily With Breakfast for 14 days. 42 tablet 0     No current facility-administered medications for this visit.         Review of Symptoms:    Review of Systems   Psychiatric/Behavioral:  Positive for depressed mood and stress. Negative for agitation, behavioral problems, dysphoric mood, hallucinations, self-injury, suicidal ideas and negative for hyperactivity. Sleep disturbance: improved with medication.The patient is nervous/anxious.          Physical Exam:   not currently breastfeeding. There is no height or weight on file to calculate BMI.   Due to the remote nature of this encounter (virtual encounter), vitals were unable to be obtained.  Height stated at 66.5 inches.  Weight stated around 182 pounds.      Physical Exam  Neurological:      Mental Status: She is alert and oriented to person, place, and time.   Psychiatric:         Attention and Perception: Attention normal.         Mood and Affect: Affect normal. Mood is anxious and depressed.         Speech: Speech normal.         Behavior: Behavior normal. Behavior is cooperative.         Thought Content: Thought content normal. Thought content is not paranoid or delusional. Thought content does not include homicidal or suicidal ideation. Thought content does not include homicidal or suicidal plan.         Cognition and Memory: Cognition and memory normal.         Judgment: Judgment normal.         Mental Status Exam:   Hygiene:   good  Cooperation:  Cooperative and attentive  Eye Contact:  Good  Psychomotor Behavior:  Appropriate  Affect:  Appropriate  Mood: depressed and anxious  Hopelessness: Denies  Speech:  Normal  Thought Process:  Linear  Thought Content:  Mood congruent  Suicidal:  None  Homicidal:  None  Hallucinations:  None  Delusion:  None  Memory:  Intact  Orientation:  Person, Place, Time and Situation  Reliability:  good  Insight:  Good  Judgement:  Good  Impulse Control:   Good  Physical/Medical Issues:  No            Lab Results:   No visits with results within 1 Month(s) from this visit.   Latest known visit with results is:   Lab on 05/09/2023   Component Date Value Ref Range Status    Vitamin B-12 05/09/2023 608  211 - 946 pg/mL Final    Iron 05/09/2023 41  37 - 145 mcg/dL Final    Iron Saturation (TSAT) 05/09/2023 7 (L)  20 - 50 % Final    Transferrin 05/09/2023 401 (H)  200 - 360 mg/dL Final    TIBC 05/09/2023 597 (H)  298 - 536 mcg/dL Final    Ferritin 05/09/2023 35.20  13.00 - 150.00 ng/mL Final    WBC 05/09/2023 7.08  3.40 - 10.80 10*3/mm3 Final    RBC 05/09/2023 5.01  3.77 - 5.28 10*6/mm3 Final    Hemoglobin 05/09/2023 11.7 (L)  12.0 - 15.9 g/dL Final    Hematocrit 05/09/2023 36.7  34.0 - 46.6 % Final    MCV 05/09/2023 73.3 (L)  79.0 - 97.0 fL Final    MCH 05/09/2023 23.4 (L)  26.6 - 33.0 pg Final    MCHC 05/09/2023 31.9  31.5 - 35.7 g/dL Final    RDW 05/09/2023 14.6  12.3 - 15.4 % Final    RDW-SD 05/09/2023 38.4  37.0 - 54.0 fl Final    MPV 05/09/2023 10.2  6.0 - 12.0 fL Final    Platelets 05/09/2023 244  140 - 450 10*3/mm3 Final    Glucose 05/09/2023 102 (H)  65 - 99 mg/dL Final    BUN 05/09/2023 10  6 - 20 mg/dL Final    Creatinine 05/09/2023 0.85  0.57 - 1.00 mg/dL Final    Sodium 05/09/2023 137  136 - 145 mmol/L Final    Potassium 05/09/2023 4.2  3.5 - 5.2 mmol/L Final    Chloride 05/09/2023 99  98 - 107 mmol/L Final    CO2 05/09/2023 24.2  22.0 - 29.0 mmol/L Final    Calcium 05/09/2023 9.7  8.6 - 10.5 mg/dL Final    Total Protein 05/09/2023 7.2  6.0 - 8.5 g/dL Final    Albumin 05/09/2023 4.2  3.5 - 5.2 g/dL Final    ALT (SGPT) 05/09/2023 11  1 - 33 U/L Final    AST (SGOT) 05/09/2023 17  1 - 32 U/L Final    Alkaline Phosphatase 05/09/2023 82  39 - 117 U/L Final    Total Bilirubin 05/09/2023 <0.2  0.0 - 1.2 mg/dL Final    Globulin 05/09/2023 3.0  gm/dL Final    A/G Ratio 05/09/2023 1.4  g/dL Final    BUN/Creatinine Ratio 05/09/2023 11.8  7.0 - 25.0 Final    Anion Gap  05/09/2023 13.8  5.0 - 15.0 mmol/L Final    eGFR 05/09/2023 95.8  >60.0 mL/min/1.73 Final    Total Cholesterol 05/09/2023 236 (H)  0 - 200 mg/dL Final    Triglycerides 05/09/2023 191 (H)  0 - 150 mg/dL Final    HDL Cholesterol 05/09/2023 101 (H)  40 - 60 mg/dL Final    LDL Cholesterol  05/09/2023 103 (H)  0 - 100 mg/dL Final    VLDL Cholesterol 05/09/2023 32  5 - 40 mg/dL Final    LDL/HDL Ratio 05/09/2023 0.96   Final         Assessment & Plan   Problems Addressed this Visit    None  Visit Diagnoses       Major depressive disorder, recurrent episode, moderate  (Chronic)   -  Primary    R/O BPD R/O BPAD    Relevant Medications    lamoTRIgine (LaMICtal) 100 MG tablet    Vortioxetine HBr (Trintellix) 5 MG tablet tablet    Anxiety disorder, unspecified type  (Chronic)       Relevant Medications    Vortioxetine HBr (Trintellix) 5 MG tablet tablet    Nightmares        Relevant Medications    prazosin (MINIPRESS) 1 MG capsule    Vortioxetine HBr (Trintellix) 5 MG tablet tablet          Diagnoses         Codes Comments    Major depressive disorder, recurrent episode, moderate    -  Primary ICD-10-CM: F33.1  ICD-9-CM: 296.32 R/O BPD R/O BPAD    Anxiety disorder, unspecified type     ICD-10-CM: F41.9  ICD-9-CM: 300.00     Nightmares     ICD-10-CM: F51.5  ICD-9-CM: 307.47             Visit Diagnoses:    ICD-10-CM ICD-9-CM   1. Major depressive disorder, recurrent episode, moderate  F33.1 296.32   2. Anxiety disorder, unspecified type  F41.9 300.00   3. Nightmares  F51.5 307.47              GOALS:  Short Term Goals: Patient will be compliant with medication, and patient will have no significant medication related side effects.  Patient will be engaged in psychotherapy as indicated.  Patient will report subjective improvement of symptoms.  Long term goals: To stabilize mood and treat/improve subjective symptoms, the patient will stay out of the hospital, the patient will be at an optimal level of functioning, and the patient  will take all medications as prescribed.  The patient verbalized understanding and agreement with goals that were mutually set.      TREATMENT PLAN: Take medications as indicated.  Continue psychotherapy.  Medication and treatment options, both pharmacological and non-pharmacological treatment options, discussed during today's visit, including any off label use of medication. Patient acknowledged and verbally consented with current treatment plan and was educated on the importance of compliance with treatment and follow-up appointments.      -Restart Trintellix at 5 mg by mouth once daily for 2 weeks / 14 days then increase Trintellix to 10 mg by mouth from then on out, for mood.  -Continue lamotrigine 100 mg by mouth once daily for mood.  -Continue prazosin 3 mg by mouth once nightly at bedtime for nightmares.      MEDICATION ISSUES:  Discussed medication options and treatment plan of prescribed medication, any off label use of medication, as well as the risks, benefits, any black box warnings including increased suicidality, and side effects including but not limited to potential falls, dizziness, possible impaired driving, GI side effects (change in appetite, abdominal discomfort, nausea, vomiting, diarrhea, and/or constipation), dry mouth, somnolence, sedation, insomnia, activation, agitation, irritation, tremors, abnormal muscle movements or disorders, headache, sweating, possible bruising or rare bleeding, electrolyte and/or fluid abnormalities, change in blood pressure/heart rate/and or heart rhythm, sexual dysfunction, and metabolic adversities among others. Patient and/or guardian agreeable to call the office with any worsening of symptoms or onset of side effects, or if any concerns or questions arise.  The contact information for the office is made available to the patient and/or guardian.  Patient and/or guardian agreeable to call 911 or go to the nearest ER should they begin having any SI/HI, or if any  urgent concerns arise. No medication side effects or related complaints today.    This APRN has discussed the benefits and risks of taking/continuing Lamictal (Lamotrigine).  The side effects of Lamictal can include a benign rash, blurred or double vision, dizziness, ataxia, sedation, headache, tremor, insomnia, poor coordination, fatigue,  nausea, vomiting, dyspepsia, rhinitis, infection, pharyngitis, asthenia, a rare but serious rash, rare multi-organ failure associated with Santana-Jean Syndrome, toxic epidermal necrolysis, drug hypersensitivity syndrome, rare blood dyscrasias, rare aseptic meningitis, rare sudden unexplained deaths in people with epilepsy, withdrawal seizures upon abrupt withdrawal, and rare activation of suicidal ideation and behavior (suicidality).  This APRN has discussed that a very slow dose titration when starting, or changing doses, of Lamictal may reduce the incidence of skin rash and other side effects.  The dosage should not be titrated upwards or increased faster than recommended due to the possibility of the discussed side effects and risk of development of a skin rash (which can become life threatening).    This APRN has also discussed that if the patient stops taking the Lamictal for 3-5 days or longer, it will be necessary to restart the drug with an initial dose titration, as rashes have been reported on reexposure.  If the patient and Provider decide to stop the Lamictal, the patient will follow the directions of this APRN/this office as a guided taper over about two weeks is appropriate due to the risk of relapse in bipolar disorder with those with a mood or bipolar disorder, the risk of seizures in those with epilepsy, and discontinuation symptoms upon rapid discontinuation of Lamictal.    The patient verbalizes understanding of benefits and risks as discussed, the patient/guardian feels the benefits outweigh the risks and is agreeable to continue/take Lamictal as  discussed.  The patient is advised should any side effects or rash develops they are to stop the Lamictal immediately and contact this APRN/this office or go to the emergency department immediately.  The patient verbalizes understanding and agreement with treatment plan in their own words.      VERBAL INFORMED CONSENT FOR MEDICATION:  The patient was educated that their proposed/prescribed psychotropic medication(s) has potential risks, side effects, adverse effects, and black box warnings; and these have been discussed with the patient.  The patient has been informed that their treatment and medication dosage is to be individualized, and may even be above or below the recommended range/dosage due to patient individualization and response, but medication is prescribed using a shared decision making approach, and no medication or dosage will be prescribed without the patient's verbal consent.  The reason for the use of the medication including any off label use and alternative modes of treatment other than or in addition to medication has been considered and discussed, the probable consequences of not receiving the proposed treatment have been discussed, and any treatment side effects, black box warnings, and cautions associated with treatment have been discussed with the patient.  The patient is allowed ample time to openly discuss and ask questions regarding the proposed medication(s) and treatment plan and the patient verbalizes understanding the reasons for the use of the medication, its potential risks and benefits, other alternative treatment(s), and the probable consequences that may occur if the proposed medication is not given.  The patient has been given ample time to ask questions and study the information and find the information to be specific, accurate, and complete.  The patient gives verbal consent for the medication(s) proposed/prescribed, they verbalized understanding that they can refuse and  withdraw consent at any time with the assistance of this APRN, and the patient has verbally confirmed that they are aware, and are willing, to take the prescribed medication and follow the treatment plan with the known possible risks, side effect, black box warnings, and any potential medication interactions, and the patient reports they will be worse off without this medication and treatment plan.  The patient is advised to contact this APRN/this office if any questions or concerns arise at any time (at [phone]), or call 911/go to the closest emergency department if needed or outside of office hours.      SUICIDE RISK ASSESSMENT AND SAFETY PLAN: Unalterable demographics and a history of mental health intervention indicate this patient is in a high risk category compared to the general population. At present, the patient denies active SI/HI, intentions, or plans at this time and agrees to seek immediate care should such thoughts develop. The patient verbalizes understanding of how to access emergency care if needed and agrees to do so. Consideration of suicide risk and protective factors such as history, current presentation, individual strengths and weaknesses, psychosocial and environmental stressors and variables, psychiatric illness and symptoms, medical conditions and pain, took place in this interview. Based on those considerations, the patient is determined: within individual baseline and presenting no imminent risk for suicide or homicide. Other recommendations: The patient does not meet the criteria for inpatient admission and is not a safety risk to self or others at today's visit. Inpatient treatment offers no significant advantages over outpatient treatment for this patient at today's visit.  The patient was given ample time for questions and fully participated in treatment planning.  The patient was encouraged to call the clinic with any questions or concerns.  The patient was informed of access to  emergency care. If patient were to develop any significant symptomatology, suicidal ideation, homicidal ideation, any concerns, or feel unsafe at any time they are to call the clinic and if unable to get immediate assistance should immediately call 911 or go to the nearest emergency room.  Patient contracted verbally for the following: If you are experiencing an emotional crisis or have thoughts of harming yourself or others, please go to your nearest local emergency room or call 911. Will continue to re-assess medication response and side effects frequently to establish efficacy and ensure safety. Risks, any black box warnings, side effects, off label usage, and benefits of medication and treatment discussed with patient, along with potential adverse side effects of current and/or newly prescribed medication, alternative treatment options, and OTC medications.  Patient verbalized understanding of potential risks, any off label use of medication, any black box warnings, and any side effects in their own words. The patient verbalized understanding and agreed to comply with the safety plan discussed in their own words.  Patient given the number to the office. Number also discussed of the 24- hour suicide hotline.           MEDS ORDERED DURING VISIT:  New Medications Ordered This Visit   Medications    prazosin (MINIPRESS) 1 MG capsule     Sig: Take 3 capsules by mouth Every Night.     Dispense:  90 capsule     Refill:  0    lamoTRIgine (LaMICtal) 100 MG tablet     Sig: Take 1 tablet by mouth Daily.     Dispense:  30 tablet     Refill:  0    Vortioxetine HBr (Trintellix) 5 MG tablet tablet     Sig: Take 1 tablet by mouth Daily With Breakfast for 14 days, THEN 2 tablets Daily With Breakfast for 14 days.     Dispense:  42 tablet     Refill:  0       Return in about 4 weeks (around 5/16/2024), or if symptoms worsen or fail to improve, for Next scheduled follow up and Recheck.       Progress Towards Goal: Not at  goal    Functional Status: Mild impairment     Prognosis: Good with Ongoing Treatment              This document has been electronically signed by ARVIN Tamez  April 18, 2024 15:03 EDT    Some of the data in this electronic note has been brought forward from a previous encounter, any necessary changes have been made, it has been reviewed by this APRN, and it is accurate.    Please note that portions of this note were completed with a voice recognition program.

## 2024-04-19 ENCOUNTER — PRIOR AUTHORIZATION (OUTPATIENT)
Dept: PSYCHIATRY | Facility: CLINIC | Age: 29
End: 2024-04-19
Payer: COMMERCIAL

## 2024-04-22 NOTE — TELEPHONE ENCOUNTER
PA approved but the pharmacy states that they are still getting a rejection code.  I contacted the insurance and they state the PA has been approved but 42 does not pass the quantity limit.  Insurance states provider will need to right 1 prescription for 1 5mg tablet daily for quantity of 14 and then after that a second prescription for 10mg 1 time a day for 14 days to get this prescription paid for.  Please advise.

## 2024-05-16 ENCOUNTER — TELEMEDICINE (OUTPATIENT)
Dept: PSYCHIATRY | Facility: CLINIC | Age: 29
End: 2024-05-16
Payer: COMMERCIAL

## 2024-05-16 DIAGNOSIS — F33.1 MAJOR DEPRESSIVE DISORDER, RECURRENT EPISODE, MODERATE: Primary | Chronic | ICD-10-CM

## 2024-05-16 DIAGNOSIS — F51.5 NIGHTMARES: ICD-10-CM

## 2024-05-16 DIAGNOSIS — F41.1 GENERALIZED ANXIETY DISORDER: Chronic | ICD-10-CM

## 2024-05-16 RX ORDER — PRAZOSIN HYDROCHLORIDE 1 MG/1
3 CAPSULE ORAL NIGHTLY
Qty: 90 CAPSULE | Refills: 0 | Status: SHIPPED | OUTPATIENT
Start: 2024-05-16

## 2024-05-16 RX ORDER — LAMOTRIGINE 100 MG/1
100 TABLET ORAL DAILY
Qty: 30 TABLET | Refills: 0 | Status: SHIPPED | OUTPATIENT
Start: 2024-05-16

## 2024-06-11 ENCOUNTER — TELEMEDICINE (OUTPATIENT)
Dept: PSYCHIATRY | Facility: CLINIC | Age: 29
End: 2024-06-11
Payer: COMMERCIAL

## 2024-06-11 DIAGNOSIS — F41.1 GENERALIZED ANXIETY DISORDER: Chronic | ICD-10-CM

## 2024-06-11 DIAGNOSIS — F33.1 MAJOR DEPRESSIVE DISORDER, RECURRENT EPISODE, MODERATE: Primary | Chronic | ICD-10-CM

## 2024-06-11 DIAGNOSIS — F51.5 NIGHTMARES: ICD-10-CM

## 2024-06-11 PROCEDURE — 99214 OFFICE O/P EST MOD 30 MIN: CPT | Performed by: NURSE PRACTITIONER

## 2024-06-11 RX ORDER — PRAZOSIN HYDROCHLORIDE 1 MG/1
3 CAPSULE ORAL NIGHTLY
Qty: 90 CAPSULE | Refills: 1 | Status: SHIPPED | OUTPATIENT
Start: 2024-06-11

## 2024-06-11 RX ORDER — VORTIOXETINE 20 MG/1
20 TABLET, FILM COATED ORAL
Qty: 30 TABLET | Refills: 1 | Status: SHIPPED | OUTPATIENT
Start: 2024-06-11

## 2024-06-11 RX ORDER — LAMOTRIGINE 100 MG/1
100 TABLET ORAL DAILY
Qty: 30 TABLET | Refills: 1 | Status: SHIPPED | OUTPATIENT
Start: 2024-06-11

## 2024-06-11 NOTE — PROGRESS NOTES
This provider is located at the [facility] (through [facility] [place] [facility]), [address], [facility], [address] using a secure [employer] Video Visit through [employer]. Patient is being seen remotely via telehealth at their home address in [place], and stated they are in a secure environment for this session. The patient's condition being diagnosed/treated is appropriate for telemedicine. The provider identified herself as well as her credentials.   The patient, and/or patients guardian, consent to be seen remotely, and when consent is given they understand that the consent allows for patient identifiable information to be sent to a third party as needed.   They may refuse to be seen remotely at any time. The electronic data is encrypted and password protected, and the patient and/or guardian has been advised of the potential risks to privacy not withstanding such measures.    You have chosen to receive care through a telehealth visit.  Do you consent to use a video/audio connection for your medical care today? Yes    Patient identifiers utilized: Name and date of birth.    Patient verbally confirmed consent for today's encounter  06/11/2024 .    The patient does verbally confirm that they are being seen today while in the [facility].  The  provider/this APRN is licensed in the [facility] where the patient is located/being seen.    Subjective   Maddy Chisholm is a 29 y.o. female who presents today for follow up    Chief Complaint: Medication management follow-up - Mood/depression, anxiety, sleeping difficulties, and nightmares follow-up    Accompanied by: The patient is alone at today's encounter    History of Present Illness:   -Since last encounter with this APRN/Office: The patient reports she has only recently, in the past couple of days, started the increased Trintellix dosage of 20 mg that was prescribed at last encounter  -Mood reported as: Doing okay and  remaining stable  -Patient rates symptoms of depression at a 6/10 on a 0-10 scale, with 10 being the worst.  -Patient rates symptoms of anxiety at a 7/10 on a 0-10 scale, with 10 being the worst.  -The patient reports her parents are currently in visiting from [place] until the end of July.  The patient reports she is excited about this, but also has some anxieties about this.  -The patient reports she will be changing apartments in July and this is going to be a good change for her.  -The patient reports that she recently had insurance changes and the Trintellix has been expensive, and this APRN the patient have discussed the Trintellix savings card.  The patient reports she wants to continue Trintellix at this time and does not want to come off of Trintellix regardless of the cost at this time so she is going to see if she qualifies and can use the Trintellix savings card.  The patient reports she will reach out to this APRN/office if there are any questions or concerns.  The patient reports due to her recent insurance change she has not been able to attend psychotherapy recently due to the cost.    -Appetite reported as: Good  -Sleep reported as: Fair.  The patient reports she knows that she drinks too much caffeine later into the day, as well as is on her phone/screen when she is lying in the bed at bedtime, all of which negatively affect her sleep.  The patient reports she is working on improving her sleep hygiene.  The patient denies any nightmares with continued prazosin use.    -Changes in medications or new medical problems/concerns since last visit: Denies  -Reported medication compliance: The patient reports compliance with their current psychotropic medication regimen.    -Reported medication side effects or concerns: Denies    -Auditory or visual hallucinations: Denies  -Behaviors different from patient baseline, or any reckless, impulsive, or risky behaviors: Denies  -Symptoms of oskar or psychosis:  Denies  -Self-injurious behavior: Denies  -SI/HI: The patient adamantly denies any suicidal or homicidal ideations, plans, or intent at the time of this encounter and is convincing.    -Using a shared decision-making approach the patient reports they would like to continue their current treatment/medication regimen without any adjustments/changes at this time.  When discussing medication efficacy with the patient, and reassessing the need and appropriateness of continued psychotropic medication treatment and doses, they report they are pleased with management of symptoms at this time, and that their current treatment/medication regimen has continued to be effective for them and they do not want to make any changes or adjustments at today's encounter.    -The patient does verbally contract for safety at today's encounter and is in verbal agreement with the safety/crisis plan. The patient reports in their own words that they will reach out to this APRN/office prior to next scheduled appointment if there is any worsening of mood, any new psychiatric symptoms, any medication side effects or concerns, any concern for safety to self or others, any suicidal or homicidal ideations plans or intent, or any concerns, or they will call 911, call or text the suicide and crisis lifeline at [phone], or go to the closest emergency department.      Patient Health Questionnaire-9 (PHQ-9) (Depression Screening Tool)  Little interest or pleasure in doing things? (P) 1-->several days   Feeling down, depressed, or hopeless? (P) 1-->several days   Trouble falling or staying asleep, or sleeping too much? (P) 2-->more than half the days   Feeling tired or having little energy? (P) 3-->nearly every day   Poor appetite or overeating? (P) 1-->several days   Feeling bad about yourself - or that you are a failure or have let yourself or your family down? (P) 3-->nearly every day   Trouble concentrating on things, such as reading the newspaper or  watching television? (P) 1-->several days   Moving or speaking so slowly that other people could have noticed? Or the opposite - being so fidgety or restless that you have been moving around a lot more than usual? (P) 0-->not at all   Thoughts that you would be better off dead, or of hurting yourself in some way? (P) 1-->several days   PHQ-9 Total Score (P) 13   If you checked off any problems, how difficult have these problems made it for you to do your work, take care of things at home, or get along with other people? (P) somewhat difficult     PHQ-9 Total Score: (P) 13      Generalized Anxiety Disorder 7-Item (JAIRO-7) Screening Tool  Feeling nervous, anxious or on edge: (P) More than half the days  Not being able to stop or control worrying: (P) Several days  Worrying too much about different things: (P) More than half the days  Trouble Relaxing: (P) Several days  Being so restless that it is hard to sit still: (P) Not at all  Feeling afraid as if something awful might happen: (P) Not at all  Becoming easily annoyed or irritable: (P) Several days  JAIRO 7 Total Score: (P) 7  If you checked any problems, how difficult have these problems made it for you to do your work, take care of things at home, or get along with other people: (P) Somewhat difficult      All Known Prior Psychiatric Medications and Responses if Known:  -Lexapro - decreased her libido so she stopped taking it (can't remember if it helped her mood or not)  -Prozac  -Wellbutrin  -Hydroxyzine  -Lamictal  -Prazosin  -Trintellix      Last Menstrual Period:  The patient denies chance of pregnancy today's encounter.  The patient was educated that her prescribed medications can have potential risk to a developing fetus. The patient is advised to contact this APRN/this office if she becomes pregnant or plans to become pregnant.  Pt verbalizes understanding and acknowledged agreement with this plan in her own words.        The following portions of the  patient's history were reviewed and updated as appropriate: allergies, current medications, past family history, past medical history, past social history, past surgical history and problem list.            Past Medical History:  Past Medical History:   Diagnosis Date    Anemia     Anxiety     COVID 08/2022    Depression     Vitamin B12 deficiency        Social History:  Social History     Socioeconomic History    Marital status: Single    Number of children: 0   Tobacco Use    Smoking status: Former     Current packs/day: 0.00     Average packs/day: 0.2 packs/day for 0.3 years (0.1 ttl pk-yrs)     Types: Cigarettes     Start date: 5/1/2020     Quit date: 8/15/2020     Years since quitting: 3.8    Smokeless tobacco: Never    Tobacco comments:     smoked for 5 years  (black and milds for 5 years then restarted 5/2020 and quit 8/2020   Vaping Use    Vaping status: Never Used   Substance and Sexual Activity    Alcohol use: Yes     Comment: Patient reports 1-2 times weekly use    Drug use: Not Currently     Comment: The patient states in her past she has used marijuanna, but not currently    Sexual activity: Yes     Partners: Male     Birth control/protection: OCP       Family History:  Family History   Adopted: Yes       Past Surgical History:  Past Surgical History:   Procedure Laterality Date    NO PAST SURGERIES         Problem List:  Patient Active Problem List   Diagnosis    Iron deficiency anemia secondary to inadequate dietary iron intake    B12 deficiency    Anxiety    BV (bacterial vaginosis)    Allergic rhinitis    Vitiligo    Generalized anxiety disorder    Moderate episode of recurrent major depressive disorder    Restless leg    Excessive sweating       Allergy:   No Known Allergies     Current Medications:   Current Outpatient Medications   Medication Sig Dispense Refill    lamoTRIgine (LaMICtal) 100 MG tablet Take 1 tablet by mouth Daily. 30 tablet 1    prazosin (MINIPRESS) 1 MG capsule Take 3 capsules  by mouth Every Night. 90 capsule 1    Vortioxetine HBr (Trintellix) 20 MG tablet Take 1 tablet by mouth Daily With Breakfast. 30 tablet 1    drospirenone-ethinyl estradiol (Machelle) 3-0.03 MG per tablet Take 1 tablet by mouth Daily. 84 tablet 0    ferrous gluconate (FERGON) 324 MG tablet Take 1 tablet by mouth Daily With Breakfast. 30 tablet 0    fluticasone (FLONASE) 50 MCG/ACT nasal spray 2 sprays by Each Nare route Daily. 16 g 3     No current facility-administered medications for this visit.         Review of Symptoms:    Review of Systems   Psychiatric/Behavioral:  Positive for sleep disturbance, depressed mood and stress. Negative for agitation, behavioral problems, hallucinations, self-injury, suicidal ideas and negative for hyperactivity. The patient is nervous/anxious.          Physical Exam:   not currently breastfeeding. There is no height or weight on file to calculate BMI.   Due to the remote nature of this encounter (virtual encounter), vitals were unable to be obtained.  Height stated at 66.5 inches.  Weight stated around 182 pounds.        Physical Exam  Neurological:      Mental Status: She is alert and oriented to person, place, and time.   Psychiatric:         Attention and Perception: Attention normal.         Mood and Affect: Mood and affect normal.         Speech: Speech normal.         Behavior: Behavior normal. Behavior is cooperative.         Thought Content: Thought content normal. Thought content is not paranoid or delusional. Thought content does not include homicidal or suicidal ideation. Thought content does not include homicidal or suicidal plan.         Cognition and Memory: Cognition and memory normal.         Judgment: Judgment normal.         Mental Status Exam:   Hygiene:   good  Cooperation:  Cooperative and attentive  Eye Contact:  Good  Psychomotor Behavior:  Appropriate  Affect:  Appropriate  Mood: normal  Hopelessness: Denies  Speech:  Normal  Thought Process:  Linear  Thought  Content:  Mood congruent  Suicidal:  None  Homicidal:  None  Hallucinations:  None  Delusion:  None  Memory:  Intact  Orientation:  Person, Place, Time and Situation  Reliability:  good  Insight:  Good  Judgement:  Good  Impulse Control:  Good  Physical/Medical Issues:  No            Lab Results:   No visits with results within 1 Month(s) from this visit.   Latest known visit with results is:   Lab on 05/09/2023   Component Date Value Ref Range Status    Vitamin B-12 05/09/2023 608  211 - 946 pg/mL Final    Iron 05/09/2023 41  37 - 145 mcg/dL Final    Iron Saturation (TSAT) 05/09/2023 7 (L)  20 - 50 % Final    Transferrin 05/09/2023 401 (H)  200 - 360 mg/dL Final    TIBC 05/09/2023 597 (H)  298 - 536 mcg/dL Final    Ferritin 05/09/2023 35.20  13.00 - 150.00 ng/mL Final    WBC 05/09/2023 7.08  3.40 - 10.80 10*3/mm3 Final    RBC 05/09/2023 5.01  3.77 - 5.28 10*6/mm3 Final    Hemoglobin 05/09/2023 11.7 (L)  12.0 - 15.9 g/dL Final    Hematocrit 05/09/2023 36.7  34.0 - 46.6 % Final    MCV 05/09/2023 73.3 (L)  79.0 - 97.0 fL Final    MCH 05/09/2023 23.4 (L)  26.6 - 33.0 pg Final    MCHC 05/09/2023 31.9  31.5 - 35.7 g/dL Final    RDW 05/09/2023 14.6  12.3 - 15.4 % Final    RDW-SD 05/09/2023 38.4  37.0 - 54.0 fl Final    MPV 05/09/2023 10.2  6.0 - 12.0 fL Final    Platelets 05/09/2023 244  140 - 450 10*3/mm3 Final    Glucose 05/09/2023 102 (H)  65 - 99 mg/dL Final    BUN 05/09/2023 10  6 - 20 mg/dL Final    Creatinine 05/09/2023 0.85  0.57 - 1.00 mg/dL Final    Sodium 05/09/2023 137  136 - 145 mmol/L Final    Potassium 05/09/2023 4.2  3.5 - 5.2 mmol/L Final    Chloride 05/09/2023 99  98 - 107 mmol/L Final    CO2 05/09/2023 24.2  22.0 - 29.0 mmol/L Final    Calcium 05/09/2023 9.7  8.6 - 10.5 mg/dL Final    Total Protein 05/09/2023 7.2  6.0 - 8.5 g/dL Final    Albumin 05/09/2023 4.2  3.5 - 5.2 g/dL Final    ALT (SGPT) 05/09/2023 11  1 - 33 U/L Final    AST (SGOT) 05/09/2023 17  1 - 32 U/L Final    Alkaline Phosphatase  05/09/2023 82  39 - 117 U/L Final    Total Bilirubin 05/09/2023 <0.2  0.0 - 1.2 mg/dL Final    Globulin 05/09/2023 3.0  gm/dL Final    A/G Ratio 05/09/2023 1.4  g/dL Final    BUN/Creatinine Ratio 05/09/2023 11.8  7.0 - 25.0 Final    Anion Gap 05/09/2023 13.8  5.0 - 15.0 mmol/L Final    eGFR 05/09/2023 95.8  >60.0 mL/min/1.73 Final    Total Cholesterol 05/09/2023 236 (H)  0 - 200 mg/dL Final    Triglycerides 05/09/2023 191 (H)  0 - 150 mg/dL Final    HDL Cholesterol 05/09/2023 101 (H)  40 - 60 mg/dL Final    LDL Cholesterol  05/09/2023 103 (H)  0 - 100 mg/dL Final    VLDL Cholesterol 05/09/2023 32  5 - 40 mg/dL Final    LDL/HDL Ratio 05/09/2023 0.96   Final         Assessment & Plan   Problems Addressed this Visit          Mental Health    Generalized anxiety disorder (Chronic)    Relevant Medications    Vortioxetine HBr (Trintellix) 20 MG tablet     Other Visit Diagnoses       Major depressive disorder, recurrent episode, moderate  (Chronic)   -  Primary    R/O BPD R/O BPAD    Relevant Medications    Vortioxetine HBr (Trintellix) 20 MG tablet    lamoTRIgine (LaMICtal) 100 MG tablet    Nightmares        Relevant Medications    Vortioxetine HBr (Trintellix) 20 MG tablet    prazosin (MINIPRESS) 1 MG capsule          Diagnoses         Codes Comments    Major depressive disorder, recurrent episode, moderate    -  Primary ICD-10-CM: F33.1  ICD-9-CM: 296.32 R/O BPD R/O BPAD    Generalized anxiety disorder     ICD-10-CM: F41.1  ICD-9-CM: 300.02     Nightmares     ICD-10-CM: F51.5  ICD-9-CM: 307.47             Visit Diagnoses:    ICD-10-CM ICD-9-CM   1. Major depressive disorder, recurrent episode, moderate  F33.1 296.32   2. Generalized anxiety disorder  F41.1 300.02   3. Nightmares  F51.5 307.47           GOALS:  Short Term Goals: Patient will be compliant with medication, and patient will have no significant medication related side effects.  Patient will be engaged in psychotherapy as indicated.  Patient will report  subjective improvement of symptoms.  Long term goals: To stabilize mood and treat/improve subjective symptoms, the patient will stay out of the hospital, the patient will be at an optimal level of functioning, and the patient will take all medications as prescribed.  The patient verbalized understanding and agreement with goals that were mutually set.      TREATMENT PLAN: Take medications as indicated.  Continue psychotherapy.  Medication and treatment options, both pharmacological and non-pharmacological treatment options, discussed during today's visit, including any off label use of medication. Patient acknowledged and verbally consented with current treatment plan and was educated on the importance of compliance with treatment and follow-up appointments.      -Continue Trintellix at recently increased dosage of 20 mg by mouth once daily for mood.  -Continue lamotrigine 100 mg by mouth once daily for mood.  -Continue prazosin 3 mg by mouth once nightly at bedtime for nightmares.      MEDICATION ISSUES:  Discussed medication options and treatment plan of prescribed medication, any off label use of medication, as well as the risks, benefits, any black box warnings including increased suicidality, and side effects including but not limited to potential falls, dizziness, possible impaired driving, GI side effects (change in appetite, abdominal discomfort, nausea, vomiting, diarrhea, and/or constipation), dry mouth, somnolence, sedation, insomnia, activation, agitation, irritation, tremors, abnormal muscle movements or disorders, headache, sweating, possible bruising or rare bleeding, electrolyte and/or fluid abnormalities, change in blood pressure/heart rate/and or heart rhythm, sexual dysfunction, and metabolic adversities among others. Patient and/or guardian agreeable to call the office with any worsening of symptoms or onset of side effects, or if any concerns or questions arise.  The contact information for the  office is made available to the patient and/or guardian.  Patient and/or guardian agreeable to call 911 or go to the nearest ER should they begin having any SI/HI, or if any urgent concerns arise. No medication side effects or related complaints today.    Due to the nature of virtual visits and inability to monitor vital signs and weight with virtual visits, the patient has been encouraged to monitor their vital signs and weight regularly either through self-monitoring via home device(s) or with their Primary Care Provider, and the patient has been instructed to notify this APRN of any abnormalities or significant changes from baseline.     This APRN has discussed the benefits and risks of taking/continuing Lamictal (Lamotrigine).  The side effects of Lamictal can include a benign rash, blurred or double vision, dizziness, ataxia, sedation, headache, tremor, insomnia, poor coordination, fatigue,  nausea, vomiting, dyspepsia, rhinitis, infection, pharyngitis, asthenia, a rare but serious rash, rare multi-organ failure associated with Santana-Jean Syndrome, toxic epidermal necrolysis, drug hypersensitivity syndrome, rare blood dyscrasias, rare aseptic meningitis, rare sudden unexplained deaths in people with epilepsy, withdrawal seizures upon abrupt withdrawal, and rare activation of suicidal ideation and behavior (suicidality).  This APRN has discussed that a very slow dose titration when starting, or changing doses, of Lamictal may reduce the incidence of skin rash and other side effects.  The dosage should not be titrated upwards or increased faster than recommended due to the possibility of the discussed side effects and risk of development of a skin rash (which can become life threatening).    This APRN has also discussed that if the patient stops taking the Lamictal for 3-5 days or longer, it will be necessary to restart the drug with an initial dose titration, as rashes have been reported on reexposure.  If  the patient and Provider decide to stop the Lamictal, the patient will follow the directions of this APRN/this office as a guided taper over about two weeks is appropriate due to the risk of relapse in bipolar disorder with those with a mood or bipolar disorder, the risk of seizures in those with epilepsy, and discontinuation symptoms upon rapid discontinuation of Lamictal.    The patient verbalizes understanding of benefits and risks as discussed, the patient/guardian feels the benefits outweigh the risks and is agreeable to continue/take Lamictal as discussed.  The patient is advised should any side effects or rash develops they are to stop the Lamictal immediately and contact this APRN/this office or go to the emergency department immediately.  The patient verbalizes understanding and agreement with treatment plan in their own words.      VERBAL INFORMED CONSENT FOR MEDICATION:  The patient was educated that their proposed/prescribed psychotropic medication(s) has potential risks, side effects, adverse effects, and black box warnings; and these have been discussed with the patient.  The patient has been informed that their treatment and medication dosage is to be individualized, and may even be above or below the recommended range/dosage due to patient individualization and response, but medication is prescribed using a shared decision making approach, and no medication or dosage will be prescribed without the patient's verbal consent.  The reason for the use of the medication including any off label use and alternative modes of treatment other than or in addition to medication has been considered and discussed, the probable consequences of not receiving the proposed treatment have been discussed, and any treatment side effects, black box warnings, and cautions associated with treatment have been discussed with the patient.  The patient is allowed ample time to openly discuss and ask questions regarding the  proposed medication(s) and treatment plan and the patient verbalizes understanding the reasons for the use of the medication, its potential risks and benefits, other alternative treatment(s), and the probable consequences that may occur if the proposed medication is not given.  The patient has been given ample time to ask questions and study the information and find the information to be specific, accurate, and complete.  The patient gives verbal consent for the medication(s) proposed/prescribed, they verbalized understanding that they can refuse and withdraw consent at any time with the assistance of this APRN, and the patient has verbally confirmed that they are aware, and are willing, to take the prescribed medication and follow the treatment plan with the known possible risks, side effect, black box warnings, and any potential medication interactions, and the patient reports they will be worse off without this medication and treatment plan.  The patient is advised to contact this APRN/this office if any questions or concerns arise at any time (at [phone]), or call 911/go to the closest emergency department if needed or outside of office hours.      SUICIDE RISK ASSESSMENT AND SAFETY PLAN: Unalterable demographics and a history of mental health intervention indicate this patient is in a high risk category compared to the general population. At present, the patient denies active SI/HI, intentions, or plans at this time and agrees to seek immediate care should such thoughts develop. The patient verbalizes understanding of how to access emergency care if needed and agrees to do so. Consideration of suicide risk and protective factors such as history, current presentation, individual strengths and weaknesses, psychosocial and environmental stressors and variables, psychiatric illness and symptoms, medical conditions and pain, took place in this interview. Based on those considerations, the patient is determined:  within individual baseline and presenting no imminent risk for suicide or homicide. Other recommendations: The patient does not meet the criteria for inpatient admission and is not a safety risk to self or others at today's visit. Inpatient treatment offers no significant advantages over outpatient treatment for this patient at today's visit.  The patient was given ample time for questions and fully participated in treatment planning.  The patient was encouraged to call the clinic with any questions or concerns.  The patient was informed of access to emergency care. If patient were to develop any significant symptomatology, suicidal ideation, homicidal ideation, any concerns, or feel unsafe at any time they are to call the clinic and if unable to get immediate assistance should immediately call 911 or go to the nearest emergency room.  Patient contracted verbally for the following: If you are experiencing an emotional crisis or have thoughts of harming yourself or others, please go to your nearest local emergency room or call 911. Will continue to re-assess medication response and side effects frequently to establish efficacy and ensure safety. Risks, any black box warnings, side effects, off label usage, and benefits of medication and treatment discussed with patient, along with potential adverse side effects of current and/or newly prescribed medication, alternative treatment options, and OTC medications.  Patient verbalized understanding of potential risks, any off label use of medication, any black box warnings, and any side effects in their own words. The patient verbalized understanding and agreed to comply with the safety plan discussed in their own words.  Patient given the number to the office. Number also discussed of the 24- hour suicide hotline.           MEDS ORDERED DURING VISIT:  New Medications Ordered This Visit   Medications    Vortioxetine HBr (Trintellix) 20 MG tablet     Sig: Take 1 tablet by  mouth Daily With Breakfast.     Dispense:  30 tablet     Refill:  1    prazosin (MINIPRESS) 1 MG capsule     Sig: Take 3 capsules by mouth Every Night.     Dispense:  90 capsule     Refill:  1    lamoTRIgine (LaMICtal) 100 MG tablet     Sig: Take 1 tablet by mouth Daily.     Dispense:  30 tablet     Refill:  1       Return in about 8 weeks (around 8/6/2024), or if symptoms worsen or fail to improve, for Next scheduled follow up and Recheck.       Progress Towards Goal: Not at goal    Functional Status: Mild impairment     Prognosis: Good with Ongoing Treatment              This document has been electronically signed by ARVIN Tamez  June 11, 2024 11:05 EDT    Some of the data in this electronic note has been brought forward from a previous encounter, any necessary changes have been made, it has been reviewed by this APRN, and it is accurate.    Please note that portions of this note were completed with a voice recognition program.

## 2024-06-18 DIAGNOSIS — D50.8 IRON DEFICIENCY ANEMIA SECONDARY TO INADEQUATE DIETARY IRON INTAKE: ICD-10-CM

## 2024-06-21 RX ORDER — FERROUS GLUCONATE 324(38)MG
324 TABLET ORAL
Qty: 30 TABLET | Refills: 0 | OUTPATIENT
Start: 2024-06-21

## 2024-07-08 ENCOUNTER — TELEPHONE (OUTPATIENT)
Dept: INTERNAL MEDICINE | Facility: CLINIC | Age: 29
End: 2024-07-08
Payer: COMMERCIAL

## 2024-07-08 DIAGNOSIS — Z30.41 ENCOUNTER FOR SURVEILLANCE OF CONTRACEPTIVE PILLS: ICD-10-CM

## 2024-07-08 RX ORDER — DROSPIRENONE AND ETHINYL ESTRADIOL 0.03MG-3MG
1 KIT ORAL DAILY
Qty: 84 TABLET | Refills: 0 | Status: SHIPPED | OUTPATIENT
Start: 2024-07-08 | End: 2024-07-12 | Stop reason: SDUPTHER

## 2024-07-08 NOTE — TELEPHONE ENCOUNTER
Caller: Maddy Chisholm    Relationship: Self    Best call back number: [phone]    THE PATIENT CALLED TO MAKE AND APPT FOR HER BIRTH CONTROL  THE APPT IS SCHEDULED FOR THIS FRIDAY.  THE PATIENT WOULD LIKE TO KNOW IF SHE CAN GET THE PRESCRIPTION FILLED  NOW THAT THE APPT IS MADE.    PLEASE CALL THE PATIENT TO LET HER KNOW.  PLEASE SEND THE SCRIPT TO [address]  [phone]

## 2024-07-12 ENCOUNTER — OFFICE VISIT (OUTPATIENT)
Dept: INTERNAL MEDICINE | Facility: CLINIC | Age: 29
End: 2024-07-12
Payer: COMMERCIAL

## 2024-07-12 VITALS
BODY MASS INDEX: 28.28 KG/M2 | OXYGEN SATURATION: 99 % | TEMPERATURE: 97.1 F | HEART RATE: 100 BPM | SYSTOLIC BLOOD PRESSURE: 124 MMHG | DIASTOLIC BLOOD PRESSURE: 68 MMHG | HEIGHT: 66 IN | WEIGHT: 176 LBS | RESPIRATION RATE: 18 BRPM

## 2024-07-12 DIAGNOSIS — D50.8 IRON DEFICIENCY ANEMIA SECONDARY TO INADEQUATE DIETARY IRON INTAKE: ICD-10-CM

## 2024-07-12 DIAGNOSIS — Z30.41 ENCOUNTER FOR SURVEILLANCE OF CONTRACEPTIVE PILLS: Primary | ICD-10-CM

## 2024-07-12 PROCEDURE — 99213 OFFICE O/P EST LOW 20 MIN: CPT | Performed by: NURSE PRACTITIONER

## 2024-07-12 RX ORDER — FERROUS GLUCONATE 324(38)MG
324 TABLET ORAL
Qty: 30 TABLET | Refills: 0 | Status: SHIPPED | OUTPATIENT
Start: 2024-07-12

## 2024-07-12 RX ORDER — DROSPIRENONE AND ETHINYL ESTRADIOL 0.03MG-3MG
1 KIT ORAL DAILY
Qty: 84 TABLET | Refills: 0 | Status: SHIPPED | OUTPATIENT
Start: 2024-07-12

## 2024-07-12 NOTE — PROGRESS NOTES
Subjective   Maddy Chisholm is a 29 y.o. female.     Chief Complaint   Patient presents with    Contraception     Refill         PCP: Vero Lopez APRN    History of Present Illness /Review of systems    History of Present Illness  The patient is a 29-year-old female who is here today for a refill on her contraception. Her last Pap smear was completed in 01/2021. This was negative for any malignancies.    The patient reports overall good health and is sexually active with her boyfriend. Her menstrual cycles are regular, and she denies any missed doses of her contraceptive pills. She is contemplating an intrauterine device (IUD) as an alternative to birth control.    The patient is seeking a refill of her ferrous gluconate prescription, which she takes once daily. She has been attempting to increase her dietary iron intake. However, she has been off ferrous gluconate for a few months due to an inability to secure a refill.    Results  Lab Results   Component Value Date    WBC 7.08 05/09/2023    HGB 11.7 (L) 05/09/2023    HCT 36.7 05/09/2023    MCV 73.3 (L) 05/09/2023     05/09/2023     Lab Results   Component Value Date    IRON 41 05/09/2023    TIBC 597 (H) 05/09/2023    FERRITIN 35.20 05/09/2023         The following portions of the patient's history were reviewed and updated as appropriate: allergies, current medications, past family history, past medical history, past social history, past surgical history and problem list.              Outpatient Medications Marked as Taking for the 7/12/24 encounter (Office Visit) with Vero Lopez APRN   Medication Sig Dispense Refill    drospirenone-ethinyl estradiol (Machelle) 3-0.03 MG per tablet Take 1 tablet by mouth Daily. 84 tablet 0    ferrous gluconate (FERGON) 324 MG tablet Take 1 tablet by mouth Daily With Breakfast. 30 tablet 0    lamoTRIgine (LaMICtal) 100 MG tablet Take 1 tablet by mouth Daily. 30 tablet 1    prazosin (MINIPRESS) 1 MG  "capsule Take 3 capsules by mouth Every Night. 90 capsule 1    Vortioxetine HBr (Trintellix) 20 MG tablet Take 1 tablet by mouth Daily With Breakfast. 30 tablet 1    [DISCONTINUED] drospirenone-ethinyl estradiol (Machelle) 3-0.03 MG per tablet Take 1 tablet by mouth Daily. 84 tablet 0    [DISCONTINUED] ferrous gluconate (FERGON) 324 MG tablet Take 1 tablet by mouth Daily With Breakfast. 30 tablet 0     No Known Allergies        Objective     Vitals:    07/12/24 0849   BP: 124/68   Pulse: 100   Resp: 18   Temp: 97.1 °F (36.2 °C)   TempSrc: Infrared   SpO2: 99%   Weight: 79.8 kg (176 lb)   Height: 167.6 cm (66\")   PainSc: 0-No pain     Body mass index is 28.41 kg/m².  Wt Readings from Last 3 Encounters:   07/12/24 79.8 kg (176 lb)   05/09/23 82.8 kg (182 lb 9.6 oz)   09/12/22 79 kg (174 lb 3.2 oz)     Physical Exam  Constitutional:       Appearance: Normal appearance. She is well-developed.   HENT:      Head: Normocephalic and atraumatic.   Eyes:      General: No scleral icterus.     Conjunctiva/sclera: Conjunctivae normal.   Cardiovascular:      Rate and Rhythm: Normal rate and regular rhythm.      Heart sounds: Normal heart sounds.   Pulmonary:      Effort: Pulmonary effort is normal. No respiratory distress.      Breath sounds: Normal breath sounds.   Abdominal:      General: Bowel sounds are normal.      Palpations: Abdomen is soft.      Tenderness: There is no abdominal tenderness.   Musculoskeletal:         General: Normal range of motion.      Cervical back: Normal range of motion.      Right lower leg: No edema.      Left lower leg: No edema.   Skin:     General: Skin is warm and dry.   Neurological:      General: No focal deficit present.      Mental Status: She is alert and oriented to person, place, and time.   Psychiatric:         Attention and Perception: Attention normal.         Mood and Affect: Mood and affect normal.         Behavior: Behavior normal. Behavior is cooperative.         Thought Content: " Thought content normal.         Cognition and Memory: Cognition normal.         Judgment: Judgment normal.                 Assessment & Plan   Diagnoses and all orders for this visit:    1. Encounter for surveillance of contraceptive pills (Primary)  -     Cancel: Ambulatory Referral to Gynecology  -     drospirenone-ethinyl estradiol (Machelle) 3-0.03 MG per tablet; Take 1 tablet by mouth Daily.  Dispense: 84 tablet; Refill: 0  -     Ambulatory Referral to Gynecology    2. Iron deficiency anemia secondary to inadequate dietary iron intake  -     ferrous gluconate (FERGON) 324 MG tablet; Take 1 tablet by mouth Daily With Breakfast.  Dispense: 30 tablet; Refill: 0        Assessment & Plan  1. Contraception refill.  A referral to Gynecology has been made for further discussion regarding intrauterine devices (IUDs).  Refill current contraception for now    2. Iron refill.  The patient's ferrous gluconate prescription has been refilled for a duration of one month. A follow-up appointment has been scheduled for one month from now, during which her iron  levels will be assessed.    BMI is >= 25 and <30. (Overweight) The following options were offered after discussion;: weight loss educational material (shared in after visit summary)      Return for Annual with pap-next avail .    I discussed my findings,recommendations, and plan of care was with the patient. They verbalized understanding and agreement.  Patient was encouraged to keep me informed of any acute changes, lack of improvement, or any new concerning symptoms.     Patient or patient representative verbalized consent for the use of Ambient Listening during the visit with  ARVIN Valle for chart documentation. 7/12/2024  09:20 EDT

## 2024-07-23 ENCOUNTER — PRIOR AUTHORIZATION (OUTPATIENT)
Dept: INTERNAL MEDICINE | Facility: CLINIC | Age: 29
End: 2024-07-23
Payer: COMMERCIAL

## 2024-07-23 NOTE — TELEPHONE ENCOUNTER
Insurance only covers a 30 day fill of medications and not a 90.  Pharmacy states patient did  a a 30 day fill of her birth control.

## 2024-08-22 DIAGNOSIS — Z30.41 ENCOUNTER FOR SURVEILLANCE OF CONTRACEPTIVE PILLS: ICD-10-CM

## 2024-08-23 RX ORDER — DROSPIRENONE AND ETHINYL ESTRADIOL 0.03MG-3MG
1 KIT ORAL DAILY
Qty: 84 TABLET | Refills: 0 | Status: SHIPPED | OUTPATIENT
Start: 2024-08-23

## 2024-08-23 NOTE — TELEPHONE ENCOUNTER
Rx Refill Note  Requested Prescriptions     Pending Prescriptions Disp Refills    drospirenone-ethinyl estradiol (Machelle) 3-0.03 MG per tablet 84 tablet 0     Sig: Take 1 tablet by mouth Daily.      Last office visit with prescribing clinician: 7/12/2024   Last telemedicine visit with prescribing clinician: Visit date not found   Next office visit with prescribing clinician: Visit date not found   Failed protocol, patient will call back to schedule annual and pap when she has calendar     Maral Colvin MA  08/23/24, 11:23 EDT  
Abdomen soft, non-tender, no guarding.

## 2024-10-12 DIAGNOSIS — F33.1 MAJOR DEPRESSIVE DISORDER, RECURRENT EPISODE, MODERATE: Chronic | ICD-10-CM

## 2024-10-14 RX ORDER — LAMOTRIGINE 100 MG/1
100 TABLET ORAL DAILY
Qty: 30 TABLET | Refills: 0 | Status: SHIPPED | OUTPATIENT
Start: 2024-10-14

## 2024-10-28 DIAGNOSIS — F51.5 NIGHTMARES: ICD-10-CM

## 2024-10-28 RX ORDER — PRAZOSIN HYDROCHLORIDE 1 MG/1
3 CAPSULE ORAL NIGHTLY
Qty: 90 CAPSULE | Refills: 0 | Status: SHIPPED | OUTPATIENT
Start: 2024-10-28

## 2024-10-31 NOTE — PROGRESS NOTES
Date: January 13, 2024  Time In: 10:04 AM   Time Out: 11:05 AM  This provider is located at the [facility]), [address], [place] [address] using a secure [employer] Video Visit through [employer]. Patient is being seen remotely via telehealth at home address in [place] and stated they are in a secure environment for this session. The patient's condition being diagnosed/treated is appropriate for telemedicine. The provider identified herself as well as her credentials. The patient, and/or patients guardian, consent to be seen remotely, and when consent is given they understand that the consent allows for patient identifiable information to be sent to a third party as needed. They may refuse to be seen remotely at any time. The electronic data is encrypted and password protected, and the patient and/or guardian has been advised of the potential risks to privacy not withstanding such measures.     You have chosen to receive care through a telehealth visit.  Do you consent to use a video/audio connection for your medical care today? Yes    PROGRESS NOTE  Data:  Maddy Chisholm is a 28 y.o. female who presents today for follow up. Patient states that she is using her laptop today which has helped her sound quality as her last couple of sessions have not had the best sound quality. Patients states that her boss has put in his two week notice and that has made her a little nervous as she doesn't know who her next boss will be. Patient states that she hopes that the next boss is going to be nice and fair but the patient states that she feels that all bosses have favorite employees so she states that she expects some favoritism no matter who her boss will be. Patient states that she has not really had much interaction with her current boss other that once when he told her that what she texted a resident's family member was not what she should have said.  Patient states that she is no longer concerned about what he specifically thinks at this time since he is leaving however, she is anxious to know who will be replacing him. Patient discussed her communication with patient's and their families and explained her thought process in regard to how she communicates with others. 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 Patient states that she is still trying to get her apartment together and get ready for the holidays. Patient states that her boyfriend is working varying schedules and she has to take him to work and pick him up and that sometimes prevents her from being able to complete tasks or gives her reasons not to start tasks if she feels that she will have to stop in the middle of a task in order to take him to work. Patient reports that she is still having issues with a low libido and feels guilty about it as she states that she wants to be intimate with her boyfriend but has a hard time find the desire to be intimate; patient attributes this in part to thoughts of things that she did in the past that she is not proud of. Discussed the patient working on letting go of her past and reminding herself that she is no longer the same person that she was before and that she has started a new life and new chapter with her boyfriend.    Chief Complaint: low libido, scheduling conflicts, feelings of depression and anxiety    History of Present Illness: Patient has struggled with anxiety and depression for several years      Clinical Maneuvering/Intervention:  CBT    (Scales based on 0 - 10 with 10 being the worst)  Depression: 7 Anxiety: 6.5       Assisted patient in processing above session content; acknowledged and normalized patient’s thoughts, feelings, and concerns.  Rationalized patient thought process regarding home and work life issues.  Discussed triggers associated with patient's feelings of anxiety and  depression such as a low libido, intrusive embarrassing thoughts from the past, and concerns about who her new boss will be.  Also discussed coping skills for patient to implement such as trying herbal supplements for libido enhancement that she discussed with her PMHNP, not comparing her relationship to what she sees on TV or on videos on social media, and continuing to do her job as best as she can.    Allowed patient to freely discuss issues without interruption or judgment. Provided safe, confidential environment to facilitate the development of positive therapeutic relationship and encourage open, honest communication. Assisted patient in identifying risk factors which would indicate the need for higher level of care including thoughts to harm self or others and/or self-harming behavior and encouraged patient to contact this office, call 911, or present to the nearest emergency room should any of these events occur. Discussed crisis intervention services and means to access. Patient adamantly and convincingly denies current suicidal or homicidal ideation or perceptual disturbance.    Assessment:   Assessment   Patient appears to maintain relative stability as compared to their baseline.  However, patient continues to struggle with depression and anxiety  which continues to cause impairment in important areas of functioning.  A result, they can be reasonably expected to continue to benefit from treatment and would likely be at increased risk for decompensation otherwise.    Mental Status Exam:   Hygiene:   good  Cooperation:  Cooperative  Eye Contact:  Good  Psychomotor Behavior:  Appropriate  Affect:  Appropriate  Mood: fluctates  Speech:  Normal  Thought Process:  Circum  Thought Content:  Normal  Suicidal:  None  Homicidal:  None  Hallucinations:  None  Delusion:  None  Memory:  Intact  Orientation:  Person, Place, Time, and Situation  Reliability:  good  Insight:  Good  Judgement:  Good  Impulse Control:   Good  Physical/Medical Issues:  No        Patient's Support Network Includes:  significant other and extended family    Functional Status: Moderate impairment     Progress toward goal: Not at goal    Prognosis: Good with Ongoing Treatment          Plan:    Patient will continue in individual outpatient therapy with focus on improved functioning and coping skills, maintaining stability, and avoiding decompensation and the need for higher level of care.    Patient will adhere to medication regimen as prescribed and report any side effects. Patient will contact this office, call 911 or present to the nearest emergency room should suicidal or homicidal ideations occur. Provide Cognitive Behavioral Therapy and Solution Focused Therapy to improve functioning, maintain stability, and avoid decompensation and the need for higher level of care.     Return in about 3 weeks, or earlier if symptoms worsen or fail to improve.           VISIT DIAGNOSIS:     ICD-10-CM ICD-9-CM   1. Generalized anxiety disorder  F41.1 300.02   2. Moderate episode of recurrent major depressive disorder  F33.1 296.32             This document has been electronically signed by PJ Toussaint  January 13, 2024 18:41 EST      Part of this note may be an electronic transcription/translation of spoken language to printed text using the Dragon Dictation System.   [As Noted in HPI] : as noted in HPI

## 2024-11-12 DIAGNOSIS — F51.5 NIGHTMARES: ICD-10-CM

## 2024-11-12 DIAGNOSIS — F33.1 MAJOR DEPRESSIVE DISORDER, RECURRENT EPISODE, MODERATE: Chronic | ICD-10-CM

## 2024-11-12 RX ORDER — LAMOTRIGINE 100 MG/1
100 TABLET ORAL DAILY
Qty: 30 TABLET | Refills: 0 | Status: SHIPPED | OUTPATIENT
Start: 2024-11-12

## 2024-11-12 RX ORDER — PRAZOSIN HYDROCHLORIDE 1 MG/1
3 CAPSULE ORAL NIGHTLY
Qty: 90 CAPSULE | Refills: 0 | Status: SHIPPED | OUTPATIENT
Start: 2024-11-12

## 2024-11-27 ENCOUNTER — TELEPHONE (OUTPATIENT)
Dept: PSYCHIATRY | Facility: CLINIC | Age: 29
End: 2024-11-27
Payer: COMMERCIAL

## 2024-11-27 NOTE — TELEPHONE ENCOUNTER
contacted patient.  Patient stated she confused the mg.  Patient states she will take 1 capsule at night and readdress with her provider next week.

## 2024-11-27 NOTE — TELEPHONE ENCOUNTER
Pt's current dosage is 3mg a night. I don't understand the request for 10mg. She is currently prescribed 1mg capsules, take 3 capsules every night. She needs to take only ONE capsule per night at bedtime and follow up with regular prescriber next week. Has not seen regular prescriber since June.

## 2024-11-27 NOTE — TELEPHONE ENCOUNTER
"Patient left a voicemail stating she \"fell off the wagon\" with taking prazosin and would like to know if prescriber can reduce the dosage down somewhat to maybe 10mg.  Please advise.  Provider out of office today.   "

## 2024-12-14 DIAGNOSIS — F33.1 MAJOR DEPRESSIVE DISORDER, RECURRENT EPISODE, MODERATE: Chronic | ICD-10-CM

## 2024-12-14 DIAGNOSIS — F51.5 NIGHTMARES: ICD-10-CM

## 2024-12-14 DIAGNOSIS — F41.1 GENERALIZED ANXIETY DISORDER: Chronic | ICD-10-CM

## 2024-12-16 RX ORDER — VORTIOXETINE 20 MG/1
20 TABLET, FILM COATED ORAL
Qty: 30 TABLET | Refills: 1 | Status: SHIPPED | OUTPATIENT
Start: 2024-12-16

## 2024-12-16 RX ORDER — LAMOTRIGINE 100 MG/1
100 TABLET ORAL DAILY
Qty: 30 TABLET | Refills: 0 | Status: SHIPPED | OUTPATIENT
Start: 2024-12-16

## 2024-12-16 RX ORDER — PRAZOSIN HYDROCHLORIDE 1 MG/1
CAPSULE ORAL
Qty: 90 CAPSULE | Refills: 0 | Status: SHIPPED | OUTPATIENT
Start: 2024-12-16

## 2025-01-06 ENCOUNTER — TELEMEDICINE (OUTPATIENT)
Dept: PSYCHIATRY | Facility: CLINIC | Age: 30
End: 2025-01-06
Payer: COMMERCIAL

## 2025-01-06 DIAGNOSIS — F33.1 MAJOR DEPRESSIVE DISORDER, RECURRENT EPISODE, MODERATE: Primary | Chronic | ICD-10-CM

## 2025-01-06 DIAGNOSIS — F51.5 NIGHTMARES: ICD-10-CM

## 2025-01-06 DIAGNOSIS — F41.1 GENERALIZED ANXIETY DISORDER: Chronic | ICD-10-CM

## 2025-01-06 RX ORDER — VORTIOXETINE 20 MG/1
20 TABLET, FILM COATED ORAL
Qty: 30 TABLET | Refills: 1 | Status: SHIPPED | OUTPATIENT
Start: 2025-01-06

## 2025-01-06 RX ORDER — LAMOTRIGINE 100 MG/1
100 TABLET ORAL DAILY
Qty: 30 TABLET | Refills: 1 | Status: SHIPPED | OUTPATIENT
Start: 2025-01-06

## 2025-01-06 RX ORDER — PRAZOSIN HYDROCHLORIDE 2 MG/1
2 CAPSULE ORAL NIGHTLY
Start: 2025-01-06

## 2025-01-06 NOTE — PROGRESS NOTES
The [facility] (through [facility]) is located at [address] [place] [address], [facility] [place]. This provider is located at their home office, accessing appointment using a secure [employer] Video Visit through [employer].  The patient, and/or guardian, state they are being seen remotely in a secure environment for this session.  The patient's condition being diagnosed/treated is appropriate for telemedicine. The provider identified herself as well as her credentials.  The patient, and/or patients guardian, consent to be seen remotely, and when consent is given they understand that the consent allows for patient identifiable information to be sent to a third party as needed.   They may refuse to be seen remotely at any time. The electronic data is encrypted and password protected, and the patient and/or guardian has been advised of the potential risks to privacy not withstanding such measures.    The patient does verbally confirm they are being seen today while physically located in the [facility]. This provider/this APRN is licensed in the [facility] where the patient is located/being seen.     Mode of Visit: Video  Location of patient: -HOME-  Location of provider: +HOME+  You have chosen to receive care through a telehealth visit.  Do you consent to use a video/audio connection for your medical care today? Yes  The visit included audio and video interaction. Technical issues occurred during this visit, and the visit began with audio and video, but ended with audio only.    Patient identifiers utilized: Name and date of birth.  Patient, and/or guardian, verbally confirmed consent for today's encounter  01/06/2025 .      Subjective   Maddy DoradoEricamy Chisholm is a 29 y.o. female who presents today for follow up    Chief Complaint: Medication management follow-up - Mood/depression, anxiety, sleeping difficulties, and nightmares follow-up    Accompanied by:  "The patient is alone at today's encounter    History of Present Illness:   -Last encounter with this APRN/Provider: 06/11/2024   -Since last encounter with this APRN/Office: The patient reports stressors at work which negatively influence her mood and reported psychiatric symptoms.  -Mood reported as: Stable, but with depressive and anxious symptoms.  The patient reports feeling like both Trintellix and lamotrigine remain effective.  -Patient rates symptoms of depression on average over the past two weeks at a 6/10 on a 0-10 scale, with 10 being the worst.  The patient reports symptoms of depression include lack of motivation and fatigue.  The patient reports her iron levels are also low, which causes her fatigue to be worse.  -Patient rates symptoms of anxiety on average over the past two weeks at a 6.5/10 on a 0-10 scale, with 10 being the worst.  The patient reports symptoms of anxiety include stress at work, and stress and worry regarding socializing at work.    -Appetite reported as: Reports she feels she has been overeating  -Changes in weight: Reports she feels like she may have gained weight  -Sleep reported as: The patient reports she stays fatigued with her low iron levels, and feels that she sleeps too much  -The patient reports averaging 10 hours of sleep each night.  -The patient reports occasional nightmares.  -The patient reports denies nighttime awakenings, and reports when she has nightmares she only remembers them in the mornings, and does not wake up \"disturbed\" throughout the night.    -Changes in medications or new medical problems/concerns since last visit: Denies any  -Reported medication compliance: The patient reports some noncompliance with current psychotropic medication regimen.  The patient reports if her and her significant other drink alcohol she will not take her medication, but this is only 1 or 2 days at a time.  The patient denies excess alcohol use, she denies addiction or " withdrawal symptoms, and reports she plans to start cutting back on how much, and how often, she drinks alcohol.  The patient declines any resources for alcohol or substance use treatment at today's encounter.  The patient also reports she had stopped taking her prazosin for a while, and has recently started back at the 1 mg dosage, but reports she has been back on that dosage for around a month now and feels she needs an increase.  -Reported medication side effects or concerns: Denies any    -Auditory or visual hallucinations: Denies  -Behaviors different from patient baseline, or any reckless, impulsive, or risky behaviors: Denies  -Symptoms of oskar or psychosis: Denies  -Self-injurious behavior: Denies  -SI/HI: When discussing the patient's PHQ-9 screening results, the patient reports she always answers yes to the last question which includes thoughts that she would be better off dead or wanting to hurt herself in some way, but she reports she is not suicidal.  She reports she does not want to die, she does not want to harm or kill herself, and reports she wants to live and feel better.  The patient reports she always marks a positive answer to this because of her history of depression.  The patient adamantly denies any suicidal or homicidal ideations, plans, or intent at the time of this encounter and is convincing.    -Using a shared decision-making approach the patient reports she is pleased with her current medications and doses of both Trintellix and lamotrigine and does not want to make changes in those medications or doses at this time, but feels since she needs an increase in her prazosin dosage due to reported nightmares.  The patient reports she was previously on prazosin 3 mg by mouth once nightly, but has only been taking 1 mg by mouth once nightly for around a month now, and feels she needs to move the dosage back up.  The patient reports she does not need refills of prazosin at today's  encounter.    -The patient does verbally contract for safety at today's encounter and is in verbal agreement with the safety/crisis plan. The patient agrees/reports in her own words that she will reach out to this APRN/office prior to next scheduled appointment if there is any worsening of mood, any new psychiatric symptoms, any medication side effects or concerns, any concern for safety to self or others, any suicidal or homicidal ideations plans or intent, or any concerns, or she will call 911, call or text the suicide and crisis lifeline at [phone], or go to the closest emergency department.      Patient Health Questionnaire-9 (PHQ-9) (Depression Screening Tool)  Little interest or pleasure in doing things? (Patient-Rptd) Several days   Feeling down, depressed, or hopeless? (Patient-Rptd) Several days   PHQ-2 Total Score (Patient-Rptd) 2   Trouble falling or staying asleep, or sleeping too much? (Patient-Rptd) Over half   Feeling tired or having little energy? (Patient-Rptd) Over half   Poor appetite or overeating? (Patient-Rptd) Over half   Feeling bad about yourself - or that you are a failure or have let yourself or your family down? (Patient-Rptd) Over half   Trouble concentrating on things, such as reading the newspaper or watching television? (Patient-Rptd) Several days   Moving or speaking so slowly that other people could have noticed? Or the opposite - being so fidgety or restless that you have been moving around a lot more than usual? (Patient-Rptd) Not at all   Thoughts that you would be better off dead, or of hurting yourself in some way? (Patient-Rptd) Several days   PHQ-9 Total Score (Patient-Rptd) 12   If you checked off any problems, how difficult have these problems made it for you to do your work, take care of things at home, or get along with other people? (Patient-Rptd) Somewhat difficult         PHQ-9 Total Score: (Patient-Rptd) 12       Generalized Anxiety Disorder 7-Item (JAIRO-7) Screening  Tool  Feeling nervous, anxious or on edge: (Patient-Rptd) More than half the days  Not being able to stop or control worrying: (Patient-Rptd) Several days  Worrying too much about different things: (Patient-Rptd) Several days  Trouble Relaxing: (Patient-Rptd) Several days  Being so restless that it is hard to sit still: (Patient-Rptd) Not at all  Feeling afraid as if something awful might happen: (Patient-Rptd) Several days  Becoming easily annoyed or irritable: (Patient-Rptd) Several days  JAIRO 7 Total Score: (Patient-Rptd) 7  If you checked any problems, how difficult have these problems made it for you to do your work, take care of things at home, or get along with other people: (Patient-Rptd) Somewhat difficult      All Known Prior Psychiatric Medications and Responses if Known:  -Lexapro - decreased her libido so she stopped taking it (can't remember if it helped her mood or not)  -Prozac  -Wellbutrin  -Hydroxyzine  -Lamictal  -Prazosin  -Trintellix      Last Menstrual Period:  12/23/2024.  The patient denies chance of pregnancy today's encounter.  The patient was educated that her prescribed medications can have potential risk to a developing fetus. The patient is advised to contact this APRN/this office if she becomes pregnant or plans to become pregnant.  Pt verbalizes understanding and acknowledged agreement with this plan in her own words.        The following portions of the patient's history were reviewed and updated as appropriate: allergies, current medications, past family history, past medical history, past social history, past surgical history and problem list.            Past Medical History:  Past Medical History:   Diagnosis Date    ADHD (attention deficit hyperactivity disorder)     Anemia     Anxiety     COVID 08/2022    Depression     Vitamin B12 deficiency        Social History:  Social History     Socioeconomic History    Marital status: Single    Number of children: 0   Tobacco Use     Smoking status: Former     Current packs/day: 0.00     Average packs/day: 1.7 packs/day for 3.5 years (6.0 ttl pk-yrs)     Types: Cigarettes     Start date: 2020     Quit date: 7/15/2022     Years since quittin.4    Smokeless tobacco: Never    Tobacco comments:     smoked for 5 years  (black and milds for 5 years then restarted 2020 and quit 2020   Vaping Use    Vaping status: Never Used   Substance and Sexual Activity    Alcohol use: Yes     Alcohol/week: 10.0 standard drinks of alcohol     Types: 10 Shots of liquor per week     Comment: Patient reports 2-3 times weekly use    Drug use: Not Currently     Comment: The patient states in her past she has used marijuanna, but not currently    Sexual activity: Yes     Partners: Male     Birth control/protection: Birth control pill       Family History:  Family History   Adopted: Yes       Past Surgical History:  Past Surgical History:   Procedure Laterality Date    NO PAST SURGERIES         Problem List:  Patient Active Problem List   Diagnosis    Iron deficiency anemia secondary to inadequate dietary iron intake    B12 deficiency    Anxiety    BV (bacterial vaginosis)    Allergic rhinitis    Vitiligo    Generalized anxiety disorder    Moderate episode of recurrent major depressive disorder    Restless leg    Excessive sweating       Allergy:   No Known Allergies     Current Medications:   Current Outpatient Medications   Medication Sig Dispense Refill    lamoTRIgine (LaMICtal) 100 MG tablet Take 1 tablet by mouth Daily. 30 tablet 1    prazosin (MINIPRESS) 2 MG capsule Take 1 capsule by mouth Every Night.      Vortioxetine HBr (Trintellix) 20 MG tablet Take 1 tablet by mouth Daily With Breakfast. 30 tablet 1    drospirenone-ethinyl estradiol (Machelle) 3-0.03 MG per tablet Take 1 tablet by mouth Daily. 84 tablet 0    ferrous gluconate (FERGON) 324 MG tablet Take 1 tablet by mouth Daily With Breakfast. 30 tablet 0    fluticasone (FLONASE) 50 MCG/ACT nasal spray  2 sprays by Each Nare route Daily. (Patient not taking: Reported on 7/12/2024) 16 g 3     No current facility-administered medications for this visit.         Review of Symptoms:    Review of Systems   Psychiatric/Behavioral:  Positive for sleep disturbance, depressed mood and stress. Negative for agitation, behavioral problems, hallucinations, self-injury, suicidal ideas and negative for hyperactivity. The patient is nervous/anxious.          Physical Exam:   not currently breastfeeding. There is no height or weight on file to calculate BMI.   Due to the remote nature of this encounter (virtual encounter), vitals were unable to be obtained.  Height stated at 66.5 inches.  Weight stated around 180 pounds.        Physical Exam  Neurological:      Mental Status: She is alert and oriented to person, place, and time.   Psychiatric:         Attention and Perception: Attention normal.         Mood and Affect: Affect normal. Mood is anxious and depressed.         Speech: Speech normal.         Behavior: Behavior normal. Behavior is cooperative.         Thought Content: Thought content normal. Thought content is not paranoid or delusional. Thought content does not include homicidal or suicidal ideation. Thought content does not include homicidal or suicidal plan.         Cognition and Memory: Cognition and memory normal.         Judgment: Judgment normal.         Mental Status Exam:   Hygiene:   good  Cooperation:  Cooperative and attentive  Eye Contact:  Good  Psychomotor Behavior:  Appropriate  Affect:  Appropriate  Mood: depressed and anxious  Hopelessness: Denies  Speech:  Normal  Thought Process:  Linear  Thought Content:  Mood congruent  Suicidal:  None  Homicidal:  None  Hallucinations:  None  Delusion:  None  Memory:  Intact  Orientation:  Person, Place, Time and Situation  Reliability:  good  Insight:  Good  Judgement:  Good  Impulse Control:  Good  Physical/Medical Issues:  No            Wt Readings from Last 3  Encounters:   07/12/24 79.8 kg (176 lb)   05/09/23 82.8 kg (182 lb 9.6 oz)   09/12/22 79 kg (174 lb 3.2 oz)     Temp Readings from Last 3 Encounters:   07/12/24 97.1 °F (36.2 °C) (Infrared)   05/09/23 96.8 °F (36 °C) (Infrared)   09/12/22 97.3 °F (36.3 °C) (Infrared)     BP Readings from Last 3 Encounters:   07/12/24 124/68   05/09/23 104/62   09/12/22 120/72     Pulse Readings from Last 3 Encounters:   07/12/24 100   05/09/23 78   09/12/22 99      BMI Readings from Last 3 Encounters:   07/12/24 28.41 kg/m²   05/09/23 29.47 kg/m²   09/12/22 28.12 kg/m²            Lab Results:   No visits with results within 1 Month(s) from this visit.   Latest known visit with results is:   Lab on 05/09/2023   Component Date Value Ref Range Status    Vitamin B-12 05/09/2023 608  211 - 946 pg/mL Final    Iron 05/09/2023 41  37 - 145 mcg/dL Final    Iron Saturation (TSAT) 05/09/2023 7 (L)  20 - 50 % Final    Transferrin 05/09/2023 401 (H)  200 - 360 mg/dL Final    TIBC 05/09/2023 597 (H)  298 - 536 mcg/dL Final    Ferritin 05/09/2023 35.20  13.00 - 150.00 ng/mL Final    WBC 05/09/2023 7.08  3.40 - 10.80 10*3/mm3 Final    RBC 05/09/2023 5.01  3.77 - 5.28 10*6/mm3 Final    Hemoglobin 05/09/2023 11.7 (L)  12.0 - 15.9 g/dL Final    Hematocrit 05/09/2023 36.7  34.0 - 46.6 % Final    MCV 05/09/2023 73.3 (L)  79.0 - 97.0 fL Final    MCH 05/09/2023 23.4 (L)  26.6 - 33.0 pg Final    MCHC 05/09/2023 31.9  31.5 - 35.7 g/dL Final    RDW 05/09/2023 14.6  12.3 - 15.4 % Final    RDW-SD 05/09/2023 38.4  37.0 - 54.0 fl Final    MPV 05/09/2023 10.2  6.0 - 12.0 fL Final    Platelets 05/09/2023 244  140 - 450 10*3/mm3 Final    Glucose 05/09/2023 102 (H)  65 - 99 mg/dL Final    BUN 05/09/2023 10  6 - 20 mg/dL Final    Creatinine 05/09/2023 0.85  0.57 - 1.00 mg/dL Final    Sodium 05/09/2023 137  136 - 145 mmol/L Final    Potassium 05/09/2023 4.2  3.5 - 5.2 mmol/L Final    Chloride 05/09/2023 99  98 - 107 mmol/L Final    CO2 05/09/2023 24.2  22.0 - 29.0  mmol/L Final    Calcium 05/09/2023 9.7  8.6 - 10.5 mg/dL Final    Total Protein 05/09/2023 7.2  6.0 - 8.5 g/dL Final    Albumin 05/09/2023 4.2  3.5 - 5.2 g/dL Final    ALT (SGPT) 05/09/2023 11  1 - 33 U/L Final    AST (SGOT) 05/09/2023 17  1 - 32 U/L Final    Alkaline Phosphatase 05/09/2023 82  39 - 117 U/L Final    Total Bilirubin 05/09/2023 <0.2  0.0 - 1.2 mg/dL Final    Globulin 05/09/2023 3.0  gm/dL Final    A/G Ratio 05/09/2023 1.4  g/dL Final    BUN/Creatinine Ratio 05/09/2023 11.8  7.0 - 25.0 Final    Anion Gap 05/09/2023 13.8  5.0 - 15.0 mmol/L Final    eGFR 05/09/2023 95.8  >60.0 mL/min/1.73 Final    Total Cholesterol 05/09/2023 236 (H)  0 - 200 mg/dL Final    Triglycerides 05/09/2023 191 (H)  0 - 150 mg/dL Final    HDL Cholesterol 05/09/2023 101 (H)  40 - 60 mg/dL Final    LDL Cholesterol  05/09/2023 103 (H)  0 - 100 mg/dL Final    VLDL Cholesterol 05/09/2023 32  5 - 40 mg/dL Final    LDL/HDL Ratio 05/09/2023 0.96   Final         Assessment & Plan   Problems Addressed this Visit          Mental Health    Generalized anxiety disorder (Chronic)    Relevant Medications    Vortioxetine HBr (Trintellix) 20 MG tablet     Other Visit Diagnoses       Major depressive disorder, recurrent episode, moderate  (Chronic)   -  Primary    R/O BPD R/O BPAD    Relevant Medications    Vortioxetine HBr (Trintellix) 20 MG tablet    lamoTRIgine (LaMICtal) 100 MG tablet    Nightmares        Relevant Medications    Vortioxetine HBr (Trintellix) 20 MG tablet    prazosin (MINIPRESS) 2 MG capsule          Diagnoses         Codes Comments    Major depressive disorder, recurrent episode, moderate    -  Primary ICD-10-CM: F33.1  ICD-9-CM: 296.32 R/O BPD R/O BPAD    Generalized anxiety disorder     ICD-10-CM: F41.1  ICD-9-CM: 300.02     Nightmares     ICD-10-CM: F51.5  ICD-9-CM: 307.47             Visit Diagnoses:    ICD-10-CM ICD-9-CM   1. Major depressive disorder, recurrent episode, moderate  F33.1 296.32   2. Generalized anxiety  disorder  F41.1 300.02   3. Nightmares  F51.5 307.47           GOALS:  Short Term Goals: Patient will be compliant with medication, and patient will have no significant medication related side effects.  Patient will be engaged in psychotherapy as indicated.  Patient will report subjective improvement of symptoms.  Long term goals: To stabilize mood and treat/improve subjective symptoms, the patient will stay out of the hospital, the patient will be at an optimal level of functioning, and the patient will take all medications as prescribed.  The patient verbalized understanding and agreement with goals that were mutually set.      TREATMENT PLAN: Take medications as indicated.  Continue psychotherapy.  Medication and treatment options, both pharmacological and non-pharmacological treatment options, discussed during today's visit, including any off label use of medication. Patient acknowledged and verbally consented with current treatment plan and was educated on the importance of compliance with treatment and follow-up appointments.      -Continue Trintellix 20 mg by mouth once daily with food/breakfast for mood.  -Continue lamotrigine 100 mg by mouth once daily for mood.  -Increase prazosin to 2 mg by mouth once nightly at bedtime for nightmares.  The patient reports she does not need refills of prazosin at today's encounter.      MEDICATION ISSUES:  Discussed medication options and treatment plan of prescribed medication, any off label use of medication, as well as the risks, benefits, any black box warnings including increased suicidality, and side effects including but not limited to potential falls, dizziness, possible impaired driving, GI side effects (change in appetite, abdominal discomfort, nausea, vomiting, diarrhea, and/or constipation), dry mouth, somnolence, sedation, insomnia, activation, agitation, irritation, tremors, abnormal muscle movements or disorders, headache, sweating, possible bruising or  rare bleeding, electrolyte and/or fluid abnormalities, change in blood pressure/heart rate/and or heart rhythm, sexual dysfunction, and metabolic adversities among others. Patient and/or guardian agreeable to call the office with any worsening of symptoms or onset of side effects, or if any concerns or questions arise.  The contact information for the office is made available to the patient and/or guardian.  Patient and/or guardian agreeable to call 911 or go to the nearest ER should they begin having any SI/HI, or if any urgent concerns arise. No medication side effects or related complaints today.    Due to the nature of virtual visits and inability to monitor vital signs and weight with virtual visits, the patient has been encouraged to monitor their vital signs and weight regularly either through self-monitoring via home device(s) or with their Primary Care Provider, and the patient has been instructed to notify this APRN of any abnormalities or significant changes from baseline.     This APRN has discussed the benefits and risks of taking/continuing Lamictal (Lamotrigine).  The side effects of Lamictal can include a benign rash, blurred or double vision, dizziness, ataxia, sedation, headache, tremor, insomnia, poor coordination, fatigue,  nausea, vomiting, dyspepsia, rhinitis, infection, pharyngitis, asthenia, a rare but serious rash, rare multi-organ failure associated with Santana-Jean Syndrome, toxic epidermal necrolysis, drug hypersensitivity syndrome, rare blood dyscrasias, rare aseptic meningitis, rare sudden unexplained deaths in people with epilepsy, withdrawal seizures upon abrupt withdrawal, and rare activation of suicidal ideation and behavior (suicidality).  This APRN has discussed that a very slow dose titration when starting, or changing doses, of Lamictal may reduce the incidence of skin rash and other side effects.  The dosage should not be titrated upwards or increased faster than  recommended due to the possibility of the discussed side effects and risk of development of a skin rash (which can become life threatening).    This APRN has also discussed that if the patient stops taking the Lamictal for 3-5 days or longer, it will be necessary to restart the drug with an initial dose titration, as rashes have been reported on reexposure.  If the patient and Provider decide to stop the Lamictal, the patient will follow the directions of this APRN/this office as a guided taper over about two weeks is appropriate due to the risk of relapse in bipolar disorder with those with a mood or bipolar disorder, the risk of seizures in those with epilepsy, and discontinuation symptoms upon rapid discontinuation of Lamictal.    The patient verbalizes understanding of benefits and risks as discussed, the patient/guardian feels the benefits outweigh the risks and is agreeable to continue/take Lamictal as discussed.  The patient is advised should any side effects or rash develops they are to stop the Lamictal immediately and contact this APRN/this office or go to the emergency department immediately.  The patient verbalizes understanding and agreement with treatment plan in their own words.      VERBAL INFORMED CONSENT FOR MEDICATION:  The patient was educated that their proposed/prescribed psychotropic medication(s) has potential risks, side effects, adverse effects, and black box warnings; and these have been discussed with the patient.  The patient has been informed that their treatment and medication dosage is to be individualized, and may even be above or below the recommended range/dosage due to patient individualization and response, but medication is prescribed using a shared decision making approach, and no medication or dosage will be prescribed without the patient's verbal consent.  The reason for the use of the medication including any off label use and alternative modes of treatment other than or in  addition to medication has been considered and discussed, the probable consequences of not receiving the proposed treatment have been discussed, and any treatment side effects, black box warnings, and cautions associated with treatment have been discussed with the patient.  The patient is allowed ample time to openly discuss and ask questions regarding the proposed medication(s) and treatment plan and the patient verbalizes understanding the reasons for the use of the medication, its potential risks and benefits, other alternative treatment(s), and the probable consequences that may occur if the proposed medication is not given.  The patient has been given ample time to ask questions and study the information and find the information to be specific, accurate, and complete.  The patient gives verbal consent for the medication(s) proposed/prescribed, they verbalized understanding that they can refuse and withdraw consent at any time with the assistance of this APRN, and the patient has verbally confirmed that they are aware, and are willing, to take the prescribed medication and follow the treatment plan with the known possible risks, side effect, black box warnings, and any potential medication interactions, and the patient reports they will be worse off without this medication and treatment plan.  The patient is advised to contact this APRN/this office if any questions or concerns arise at any time (at [phone]), or call 911/go to the closest emergency department if needed or outside of office hours.      SUICIDE RISK ASSESSMENT AND SAFETY PLAN: Unalterable demographics and a history of mental health intervention indicate this patient is in a high risk category compared to the general population. At present, the patient denies active SI/HI, intentions, or plans at this time and agrees to seek immediate care should such thoughts develop. The patient verbalizes understanding of how to access emergency care if  needed and agrees to do so. Consideration of suicide risk and protective factors such as history, current presentation, individual strengths and weaknesses, psychosocial and environmental stressors and variables, psychiatric illness and symptoms, medical conditions and pain, took place in this interview. Based on those considerations, the patient is determined: within individual baseline and presenting no imminent risk for suicide or homicide. Other recommendations: The patient does not meet the criteria for inpatient admission and is not a safety risk to self or others at today's visit. Inpatient treatment offers no significant advantages over outpatient treatment for this patient at today's visit.  The patient was given ample time for questions and fully participated in treatment planning.  The patient was encouraged to call the clinic with any questions or concerns.  The patient was informed of access to emergency care. If patient were to develop any significant symptomatology, suicidal ideation, homicidal ideation, any concerns, or feel unsafe at any time they are to call the clinic and if unable to get immediate assistance should immediately call 911 or go to the nearest emergency room.  Patient contracted verbally for the following: If you are experiencing an emotional crisis or have thoughts of harming yourself or others, please go to your nearest local emergency room or call 911. Will continue to re-assess medication response and side effects frequently to establish efficacy and ensure safety. Risks, any black box warnings, side effects, off label usage, and benefits of medication and treatment discussed with patient, along with potential adverse side effects of current and/or newly prescribed medication, alternative treatment options, and OTC medications.  Patient verbalized understanding of potential risks, any off label use of medication, any black box warnings, and any side effects in their own words.  The patient verbalized understanding and agreed to comply with the safety plan discussed in their own words.  Patient given the number to the office. Number also discussed of the 24- hour suicide hotline.           MEDS ORDERED DURING VISIT:  New Medications Ordered This Visit   Medications    Vortioxetine HBr (Trintellix) 20 MG tablet     Sig: Take 1 tablet by mouth Daily With Breakfast.     Dispense:  30 tablet     Refill:  1    lamoTRIgine (LaMICtal) 100 MG tablet     Sig: Take 1 tablet by mouth Daily.     Dispense:  30 tablet     Refill:  1    prazosin (MINIPRESS) 2 MG capsule     Sig: Take 1 capsule by mouth Every Night.       Return in about 4 weeks (around 2/3/2025), or if symptoms worsen or fail to improve, for Next scheduled follow up and Recheck.       Progress Towards Goal: Not at goal    Functional Status: Moderate impairment     Prognosis: Good with Ongoing Treatment              This document has been electronically signed by ARVIN Tamez  January 6, 2025 12:14 EST    Some of the data in this electronic note has been brought forward from a previous encounter, any necessary changes have been made, it has been reviewed by this APRN, and it is accurate.    Please note that portions of this note were completed with a voice recognition program.

## 2025-02-14 DIAGNOSIS — Z30.41 ENCOUNTER FOR SURVEILLANCE OF CONTRACEPTIVE PILLS: ICD-10-CM

## 2025-02-19 RX ORDER — DROSPIRENONE AND ETHINYL ESTRADIOL 0.03MG-3MG
1 KIT ORAL DAILY
Qty: 28 TABLET | Refills: 0 | Status: SHIPPED | OUTPATIENT
Start: 2025-02-19

## 2025-02-19 NOTE — TELEPHONE ENCOUNTER
Left message on voicemail and sent patient an my chart message.  She is due now for physical and pap.   I have sent in one month refill of her birth control.

## 2025-02-20 DIAGNOSIS — D50.8 IRON DEFICIENCY ANEMIA SECONDARY TO INADEQUATE DIETARY IRON INTAKE: ICD-10-CM

## 2025-02-24 RX ORDER — FERROUS GLUCONATE 324(38)MG
324 TABLET ORAL
Qty: 30 TABLET | Refills: 0 | Status: SHIPPED | OUTPATIENT
Start: 2025-02-24

## 2025-02-24 NOTE — TELEPHONE ENCOUNTER
Rx Refill Note  Requested Prescriptions     Pending Prescriptions Disp Refills    ferrous gluconate (FERGON) 324 MG tablet 30 tablet 0     Sig: Take 1 tablet by mouth Daily With Breakfast.      Last office visit with prescribing clinician: 7/12/2024   Last telemedicine visit with prescribing clinician: Visit date not found   Next office visit with prescribing clinician: 3/7/2025    Iron profile: 05/09/2023      No protocol on file for medication.     Mally Sarah MA  02/24/25, 14:14 EST